# Patient Record
Sex: FEMALE | Race: WHITE | NOT HISPANIC OR LATINO | Employment: FULL TIME | URBAN - METROPOLITAN AREA
[De-identification: names, ages, dates, MRNs, and addresses within clinical notes are randomized per-mention and may not be internally consistent; named-entity substitution may affect disease eponyms.]

---

## 2017-03-06 ENCOUNTER — GENERIC CONVERSION - ENCOUNTER (OUTPATIENT)
Dept: OTHER | Facility: OTHER | Age: 27
End: 2017-03-06

## 2017-06-09 ENCOUNTER — ALLSCRIPTS OFFICE VISIT (OUTPATIENT)
Dept: OTHER | Facility: OTHER | Age: 27
End: 2017-06-09

## 2017-06-09 DIAGNOSIS — N92.6 IRREGULAR MENSTRUATION: ICD-10-CM

## 2017-06-17 ENCOUNTER — APPOINTMENT (OUTPATIENT)
Dept: LAB | Facility: CLINIC | Age: 27
End: 2017-06-17
Payer: COMMERCIAL

## 2017-06-17 ENCOUNTER — TRANSCRIBE ORDERS (OUTPATIENT)
Dept: LAB | Facility: CLINIC | Age: 27
End: 2017-06-17

## 2017-06-17 DIAGNOSIS — N92.6 IRREGULAR MENSTRUATION: ICD-10-CM

## 2017-06-17 LAB
ESTRADIOL SERPL-MCNC: 23 PG/ML
FSH SERPL-ACNC: 5.3 MIU/ML
GLUCOSE 2H P 75 G GLC PO SERPL-MCNC: 91 MG/DL (ref 70–183)
GLUCOSE P FAST SERPL-MCNC: 92 MG/DL (ref 65–99)
PROLACTIN SERPL-MCNC: 6.2 NG/ML
TSH SERPL DL<=0.05 MIU/L-ACNC: 1.63 UIU/ML (ref 0.36–3.74)

## 2017-06-17 PROCEDURE — 82670 ASSAY OF TOTAL ESTRADIOL: CPT

## 2017-06-17 PROCEDURE — 82627 DEHYDROEPIANDROSTERONE: CPT

## 2017-06-17 PROCEDURE — 82950 GLUCOSE TEST: CPT

## 2017-06-17 PROCEDURE — 82947 ASSAY GLUCOSE BLOOD QUANT: CPT

## 2017-06-17 PROCEDURE — 82948 REAGENT STRIP/BLOOD GLUCOSE: CPT

## 2017-06-17 PROCEDURE — 83001 ASSAY OF GONADOTROPIN (FSH): CPT

## 2017-06-17 PROCEDURE — 84146 ASSAY OF PROLACTIN: CPT

## 2017-06-17 PROCEDURE — 84403 ASSAY OF TOTAL TESTOSTERONE: CPT

## 2017-06-17 PROCEDURE — 36415 COLL VENOUS BLD VENIPUNCTURE: CPT

## 2017-06-17 PROCEDURE — 84402 ASSAY OF FREE TESTOSTERONE: CPT

## 2017-06-17 PROCEDURE — 84443 ASSAY THYROID STIM HORMONE: CPT

## 2017-06-18 LAB — DHEA-S SERPL-MCNC: 152.4 UG/DL (ref 84.8–378)

## 2017-06-19 LAB — GLUCOSE SERPL-MCNC: 85 MG/DL (ref 65–140)

## 2017-06-20 ENCOUNTER — APPOINTMENT (OUTPATIENT)
Dept: LAB | Facility: HOSPITAL | Age: 27
End: 2017-06-20
Attending: OBSTETRICS & GYNECOLOGY
Payer: COMMERCIAL

## 2017-06-20 ENCOUNTER — TRANSCRIBE ORDERS (OUTPATIENT)
Dept: ADMINISTRATIVE | Facility: HOSPITAL | Age: 27
End: 2017-06-20

## 2017-06-20 DIAGNOSIS — N92.6 IRREGULAR MENSTRUAL CYCLE: Primary | ICD-10-CM

## 2017-06-20 DIAGNOSIS — N92.6 IRREGULAR MENSTRUAL CYCLE: ICD-10-CM

## 2017-06-20 LAB
INSULIN SERPL-ACNC: 9.4 MU/L (ref 3–25)
TESTOST FREE SERPL-MCNC: 0.9 PG/ML (ref 0–4.2)
TESTOST SERPL-MCNC: 17 NG/DL (ref 8–48)

## 2017-06-20 PROCEDURE — 36415 COLL VENOUS BLD VENIPUNCTURE: CPT

## 2017-06-20 PROCEDURE — 83525 ASSAY OF INSULIN: CPT

## 2017-06-21 ENCOUNTER — HOSPITAL ENCOUNTER (OUTPATIENT)
Dept: RADIOLOGY | Age: 27
Discharge: HOME | End: 2017-06-21
Payer: COMMERCIAL

## 2017-06-21 DIAGNOSIS — N92.6 IRREGULAR MENSTRUATION: ICD-10-CM

## 2017-06-21 PROCEDURE — 76830 TRANSVAGINAL US NON-OB: CPT

## 2017-06-21 PROCEDURE — 76856 US EXAM PELVIC COMPLETE: CPT

## 2017-07-05 ENCOUNTER — APPOINTMENT (OUTPATIENT)
Dept: LAB | Facility: CLINIC | Age: 27
End: 2017-07-05
Payer: COMMERCIAL

## 2017-07-05 ENCOUNTER — ALLSCRIPTS OFFICE VISIT (OUTPATIENT)
Dept: OTHER | Facility: OTHER | Age: 27
End: 2017-07-05

## 2017-07-05 DIAGNOSIS — N92.6 IRREGULAR MENSTRUATION: ICD-10-CM

## 2017-07-05 LAB
ESTRADIOL SERPL-MCNC: 62 PG/ML
PROGEST SERPL-MCNC: 0.7 NG/ML

## 2017-07-05 PROCEDURE — 84144 ASSAY OF PROGESTERONE: CPT

## 2017-07-05 PROCEDURE — 82670 ASSAY OF TOTAL ESTRADIOL: CPT

## 2017-07-05 PROCEDURE — 36415 COLL VENOUS BLD VENIPUNCTURE: CPT

## 2017-07-19 ENCOUNTER — ALLSCRIPTS OFFICE VISIT (OUTPATIENT)
Dept: OTHER | Facility: OTHER | Age: 27
End: 2017-07-19

## 2017-08-17 ENCOUNTER — TRANSCRIBE ORDERS (OUTPATIENT)
Dept: ADMINISTRATIVE | Facility: HOSPITAL | Age: 27
End: 2017-08-17

## 2017-08-17 ENCOUNTER — APPOINTMENT (OUTPATIENT)
Dept: LAB | Facility: MEDICAL CENTER | Age: 27
End: 2017-08-17
Payer: COMMERCIAL

## 2017-08-17 DIAGNOSIS — Z31.41 FERTILITY TESTING: ICD-10-CM

## 2017-08-17 DIAGNOSIS — Z31.41 FERTILITY TESTING: Primary | ICD-10-CM

## 2017-08-17 PROCEDURE — 83516 IMMUNOASSAY NONANTIBODY: CPT

## 2017-08-21 LAB — MIS SERPL-MCNC: 6.19 NG/ML

## 2017-08-30 ENCOUNTER — HOSPITAL ENCOUNTER (OUTPATIENT)
Dept: RADIOLOGY | Facility: HOSPITAL | Age: 27
Discharge: HOME | End: 2017-08-30
Attending: OBSTETRICS & GYNECOLOGY | Admitting: RADIOLOGY
Payer: COMMERCIAL

## 2017-08-30 DIAGNOSIS — Z31.41 ENCOUNTER FOR FERTILITY TESTING: ICD-10-CM

## 2017-08-30 PROCEDURE — 58340 CATHETER FOR HYSTEROGRAPHY: CPT

## 2017-08-30 PROCEDURE — 74740 X-RAY FEMALE GENITAL TRACT: CPT

## 2017-08-30 RX ADMIN — IOHEXOL 4 ML: 240 INJECTION, SOLUTION INTRATHECAL; INTRAVASCULAR; INTRAVENOUS; ORAL at 17:30

## 2018-01-10 NOTE — MISCELLANEOUS
Provider Comments  Provider Comments:   Contacted patient and left message to reschedule appointment if necessary        Signatures   Electronically signed by : Radhames Cho, ; Oct 18 2016  8:39AM EST                       (Author)

## 2018-01-11 NOTE — RESULT NOTES
Message   Recorded as Task   Date: 06/12/2016 06:46 PM, Created By: Carson Morelos   Task Name: Verify Patient Results   Assigned To:  Sarah Dunbar   Regarding Patient: Jessica Wild, Status: Active   CommentEvorn Bills - 12 Jun 2016 6:46 PM        Sarah Dunbar - 14 Jun 2016 5:04 PM     TASK EDITED  Cards sent that Pap was normal        Signatures   Electronically signed by : Caroline Foreman CNM; Jun 14 2016  5:04PM EST                       (Author)

## 2018-01-14 VITALS
BODY MASS INDEX: 36.02 KG/M2 | SYSTOLIC BLOOD PRESSURE: 122 MMHG | WEIGHT: 211 LBS | HEIGHT: 64 IN | DIASTOLIC BLOOD PRESSURE: 80 MMHG

## 2018-01-14 VITALS
WEIGHT: 210 LBS | DIASTOLIC BLOOD PRESSURE: 74 MMHG | HEIGHT: 63 IN | SYSTOLIC BLOOD PRESSURE: 120 MMHG | BODY MASS INDEX: 37.21 KG/M2

## 2018-01-15 VITALS
HEIGHT: 64 IN | BODY MASS INDEX: 36.25 KG/M2 | WEIGHT: 212.31 LBS | DIASTOLIC BLOOD PRESSURE: 80 MMHG | SYSTOLIC BLOOD PRESSURE: 122 MMHG

## 2018-01-15 NOTE — MISCELLANEOUS
Message   Recorded as Task   Date: 03/06/2017 10:33 AM, Created By: Shyann May   Task Name: Medical Complaint Callback   Assigned To: Darrius Solomon   Regarding Patient: Peter Donohue, Status: In Progress   Comment:    Xochilt Bustillosberly - 06 Mar 2017 10:33 AM     TASK CREATED  Caller: Self; Medical Complaint; [phone] (Home)  Pt states menses is 8 days late and pt is not taking any bc  Pt took  a few HPT's and all came back negative  Pt would like to speak w a nurse  Pt is @ [facility] - 06 Mar 2017 10:34 AM     TASK IN PROGRESS   Giovanni Carlson - 06 Mar 2017 11:22 AM     TASK EDITED  I told pt it is ok to miss a period  Pt is trying to conceive  Does keep track of menses  If she misses next mo, will cb        Active Problems    1  Bleeding in early pregnancy (640 90) (O20 9)   2  Encounter for gynecological examination without abnormal finding (V72 31) (Z01 419)   3  Positive urine pregnancy test (V72 42) (Z32 01)    Allergies    1  No Known Drug Allergies    Signatures   Electronically signed by :  Adarsh Clayton, ; Mar  6 2017 11:23AM EST                       (Author)

## 2018-01-17 NOTE — MISCELLANEOUS
Message   Recorded as Task   Date: 10/17/2016 03:11 PM, Created By: Jonathan Finney   Task Name: Follow Up   Assigned To: Douglas Pina   Regarding Patient: Regina Mcguire, Status: In Progress   Luciana Neal - 17 Oct 2016 3:11 PM     TASK CREATED  Caller: Self; [phone] (Home)  pt wants bw results call her at [facility] - 17 Oct 2016 4:03 PM     TASK IN [address] [place] [address] - 17 Oct 2016 4:08 PM     TASK EDITED                 made pt aware  neg pregnancy test  started "period saturday" normal  advised if problem continues where she is skipping periods to call back  Patrick Harris - 17 Oct 2016 4:09 PM     TASK EDITED                 pt skipped period in september got period sat thought she was pregnant, sent HCG and negative results today, she is questioning if she needs US please advise thanks   Patrick Harris - 18 Oct 2016 9:18 AM     TASK EDITED  Denys Iyergilmadk -this is the patient that had a [address] today  I was unaware she had an apt when I spoke to patient for her results  Sorry for the confusion, pt was questioning her lab results Which were negative  pt made aware and was questioning if she still needed an US-which I accidently sent to [facility]  Active Problems    1  Bleeding in early pregnancy (640 90) (O20 9)   2  Encounter for gynecological examination without abnormal finding (V72 31) (Z01 419)   3  Positive urine pregnancy test (V72 42) (Z32 01)    Allergies    1   No Known Drug Allergies    Signatures   Electronically signed by : Melissa Katz LPN; Oct 18 6312  4:64AN EST                       (Author)

## 2018-01-17 NOTE — MISCELLANEOUS
Message   Recorded as Task  Date: 10/17/2016 09:19 AM, Created By: Bunny Whitten  Task Name: Follow Up  Assigned To: Desiree Arambula  Regarding Patient: Bernie Jaime, Status: Active  CommentMaryellenwilder Cain - 17 Oct 2016 9:19 AM    TASK CREATED  Caller: Self; [phone] (Home)  pt has had a few different readings on her pregnancy test first positive,then neg then she began to bleed not sure what she is to do please call her at [phone] - 17 Oct 2016 10:08 AM    TASK REPLIED TO: Previously Assigned To [facility],Team  p/c to pt  stated LMP 8/25/16  Pt still has only had some slight spotting  Pt denies any cramping or Abd  pain  Appt offered for today pt refused due to work obligations  Pt given appt  for tomorrow in W/G ofc  in AM  Rx for HCG, prg and T&S for [facility] /lab to be completed today  Ectopic and bleeding precautions given  Pt verbalized understanding  Active Problems    1  Bleeding in early pregnancy (640 90) (O20 9)   2  Encounter for gynecological examination without abnormal finding (V72 31) (Z01 419)   3  Positive urine pregnancy test (V72 42) (Z32 01)    Allergies    1  No Known Drug Allergies    Plan  Bleeding in early pregnancy, Positive urine pregnancy test    · (1) HCG QUANT; Status:Active - Retrospective Authorization; Requested for:17Oct2016;    · (1) TYPE & SCREEN; Status:Active - Retrospective Authorization; Requested  for:17Oct2016;   the patient been transfused in the last 3 months? : No  number of units for surgical date : 0  of Surgery : 17Oct2016  the patient pregnant? : Yes  Positive urine pregnancy test    · (1) PROGESTERONE; Status:Active - Retrospective Authorization;  Requested  FTI:[id number];     Signatures   Electronically signed by : Roxy Saha, ; Oct 17 2016 10:09AM EST                       (Author)

## 2018-02-26 ENCOUNTER — TELEPHONE (OUTPATIENT)
Dept: OBGYN CLINIC | Facility: CLINIC | Age: 28
End: 2018-02-26

## 2018-02-26 DIAGNOSIS — N91.2 AMENORRHEA: Primary | ICD-10-CM

## 2018-02-26 NOTE — TELEPHONE ENCOUNTER
Incoming call from patient  Has been trying to conceive for 2 years  Was seen by infertility  HSG done in August was normal  Positive home UPT today  Requesting HCG as she is anxious  Had a positive UPT before which turned out to be a chemical pregnancy  LMP 1/22/2018  Will route to provider

## 2018-02-28 ENCOUNTER — APPOINTMENT (OUTPATIENT)
Dept: LAB | Facility: HOSPITAL | Age: 28
End: 2018-02-28
Attending: OBSTETRICS & GYNECOLOGY
Payer: COMMERCIAL

## 2018-02-28 ENCOUNTER — TELEPHONE (OUTPATIENT)
Dept: OBGYN CLINIC | Facility: CLINIC | Age: 28
End: 2018-02-28

## 2018-02-28 DIAGNOSIS — N91.2 AMENORRHEA: ICD-10-CM

## 2018-02-28 LAB — B-HCG SERPL-ACNC: 287 MIU/ML

## 2018-02-28 PROCEDURE — 36415 COLL VENOUS BLD VENIPUNCTURE: CPT

## 2018-02-28 PROCEDURE — 84702 CHORIONIC GONADOTROPIN TEST: CPT

## 2018-02-28 NOTE — TELEPHONE ENCOUNTER
Pt advised of HCG results  Pt very happy with pregnancy   Will call for dating u s  appt  When aware of schedule  Advised to begin taking PNV's, increase water intake

## 2018-03-22 ENCOUNTER — OFFICE VISIT (OUTPATIENT)
Dept: OBGYN CLINIC | Facility: MEDICAL CENTER | Age: 28
End: 2018-03-22
Payer: COMMERCIAL

## 2018-03-22 VITALS — BODY MASS INDEX: 34.87 KG/M2 | WEIGHT: 200 LBS | DIASTOLIC BLOOD PRESSURE: 76 MMHG | SYSTOLIC BLOOD PRESSURE: 112 MMHG

## 2018-03-22 DIAGNOSIS — N91.2 AMENORRHEA: Primary | ICD-10-CM

## 2018-03-22 DIAGNOSIS — Z87.42 HISTORY OF INFERTILITY: ICD-10-CM

## 2018-03-22 PROBLEM — N92.6 IRREGULAR MENSES: Status: ACTIVE | Noted: 2017-06-09

## 2018-03-22 PROCEDURE — 76817 TRANSVAGINAL US OBSTETRIC: CPT | Performed by: NURSE PRACTITIONER

## 2018-03-22 PROCEDURE — 99213 OFFICE O/P EST LOW 20 MIN: CPT | Performed by: NURSE PRACTITIONER

## 2018-03-22 NOTE — PROGRESS NOTES
EARLY PREGNANCY ULTRASOUND    SUBJECTIVE    HPI: Jed Hedrick is a 29 y o  primigravida female here today for early pregnancy ultrasound  She is accompanied by her , Sina Bailey  Patient's last menstrual period was 01/22/2018 (exact date)  Menses are irregular, Q28-36d  This pregnancy was planned, they have been TTC x2y and did seek opinion from Dr Nico Lambert; this pregnancy was naturally conceived  Denies a family history of birth defects or intellectual defects  Vaccinated against varicella  Taking a prenatal vitamin  Works as an autistic  within the [employer]  Symptoms of pregnancy: breast tenderness, fatigue, nausea and positive home pregnancy test     No Known Allergies    Current Outpatient Prescriptions:     Prenatal Vit-Fe Fumarate-FA (PRENATAL COMPLETE PO), Take by mouth, Disp: , Rfl:       OBJECTIVE  Vitals:    03/22/18 0837   BP: 112/76   BP Location: Left arm   Patient Position: Sitting   Weight: 90 7 kg (200 lb)         Early OB Ultrasound Procedure Note: Transvaginal US    Technician: Study performed by the interpreting NP    Indications:  Early gestation, dating & viability    Procedure Details   The entire study was done at settings of 6 0 to 8 0 MHz  Gestational Sac: Present  Yolk sac: Present  Crown-rump length is 1 13cm and calculates to an estimated gestational age of 9 weeks, 2 days  Embryonic cardiac activity is seen at a rate of 188 b/min    Description of fetal anatomy Normal    Cul-de-sac: no fluid  Left ovary: corpus luteum present  Right ovary: appears normal  Description of uterus: anteverted  Description of cervix: Appears normal    Findings:  Viable, laughlin intrauterine pregnancy      ASSESSMENT  Early pregnancy at 7 weeks 2 days with a calculated MAGGY of 11/6/18 based on [address]  MAGGY differs by >7d from LMP, favor dating based on [address] [facility]  1 - Discussed referral to [facility] to discuss genetic screening options for fetus; reviewed time-sensitive nature  2 - RTO for OB interview and PN-1 visit          All questions were answered & Rogers Cormier expressed understanding      Jayesh Joe

## 2018-04-10 ENCOUNTER — INITIAL PRENATAL (OUTPATIENT)
Dept: OBGYN CLINIC | Facility: MEDICAL CENTER | Age: 28
End: 2018-04-10

## 2018-04-10 ENCOUNTER — APPOINTMENT (OUTPATIENT)
Dept: LAB | Facility: MEDICAL CENTER | Age: 28
End: 2018-04-10
Payer: COMMERCIAL

## 2018-04-10 VITALS
WEIGHT: 203 LBS | DIASTOLIC BLOOD PRESSURE: 78 MMHG | BODY MASS INDEX: 35.97 KG/M2 | RESPIRATION RATE: 14 BRPM | HEIGHT: 63 IN | SYSTOLIC BLOOD PRESSURE: 122 MMHG

## 2018-04-10 DIAGNOSIS — Z34.91 FIRST TRIMESTER PREGNANCY: ICD-10-CM

## 2018-04-10 DIAGNOSIS — Z34.91 FIRST TRIMESTER PREGNANCY: Primary | ICD-10-CM

## 2018-04-10 LAB
ABO GROUP BLD: NORMAL
BACTERIA UR QL AUTO: ABNORMAL /HPF
BASOPHILS # BLD AUTO: 0.01 THOUSANDS/ΜL (ref 0–0.1)
BASOPHILS NFR BLD AUTO: 0 % (ref 0–1)
BILIRUB UR QL STRIP: NEGATIVE
BLD GP AB SCN SERPL QL: NEGATIVE
CLARITY UR: CLEAR
COLOR UR: ABNORMAL
EOSINOPHIL # BLD AUTO: 0.12 THOUSAND/ΜL (ref 0–0.61)
EOSINOPHIL NFR BLD AUTO: 2 % (ref 0–6)
ERYTHROCYTE [DISTWIDTH] IN BLOOD BY AUTOMATED COUNT: 13.9 % (ref 11.6–15.1)
GLUCOSE UR STRIP-MCNC: NEGATIVE MG/DL
HBV SURFACE AG SER QL: NORMAL
HCT VFR BLD AUTO: 40.9 % (ref 34.8–46.1)
HGB BLD-MCNC: 13.8 G/DL (ref 11.5–15.4)
HGB UR QL STRIP.AUTO: NEGATIVE
KETONES UR STRIP-MCNC: NEGATIVE MG/DL
LEUKOCYTE ESTERASE UR QL STRIP: ABNORMAL
LYMPHOCYTES # BLD AUTO: 1.84 THOUSANDS/ΜL (ref 0.6–4.47)
LYMPHOCYTES NFR BLD AUTO: 24 % (ref 14–44)
MCH RBC QN AUTO: 28.2 PG (ref 26.8–34.3)
MCHC RBC AUTO-ENTMCNC: 33.7 G/DL (ref 31.4–37.4)
MCV RBC AUTO: 84 FL (ref 82–98)
MONOCYTES # BLD AUTO: 0.51 THOUSAND/ΜL (ref 0.17–1.22)
MONOCYTES NFR BLD AUTO: 7 % (ref 4–12)
MUCOUS THREADS UR QL AUTO: ABNORMAL
NEUTROPHILS # BLD AUTO: 5.28 THOUSANDS/ΜL (ref 1.85–7.62)
NEUTS SEG NFR BLD AUTO: 67 % (ref 43–75)
NITRITE UR QL STRIP: NEGATIVE
NON-SQ EPI CELLS URNS QL MICRO: ABNORMAL /HPF
NRBC BLD AUTO-RTO: 0 /100 WBCS
PH UR STRIP.AUTO: 7 [PH] (ref 4.5–8)
PLATELET # BLD AUTO: 295 THOUSANDS/UL (ref 149–390)
PMV BLD AUTO: 10.5 FL (ref 8.9–12.7)
PROT UR STRIP-MCNC: ABNORMAL MG/DL
RBC # BLD AUTO: 4.9 MILLION/UL (ref 3.81–5.12)
RBC #/AREA URNS AUTO: ABNORMAL /HPF
RH BLD: POSITIVE
RUBV IGG SERPL IA-ACNC: 52.5 IU/ML
SP GR UR STRIP.AUTO: 1.03 (ref 1–1.03)
SPECIMEN EXPIRATION DATE: NORMAL
UROBILINOGEN UR QL STRIP.AUTO: 0.2 E.U./DL
WBC # BLD AUTO: 7.77 THOUSAND/UL (ref 4.31–10.16)
WBC #/AREA URNS AUTO: ABNORMAL /HPF

## 2018-04-10 PROCEDURE — 81001 URINALYSIS AUTO W/SCOPE: CPT

## 2018-04-10 PROCEDURE — 87086 URINE CULTURE/COLONY COUNT: CPT

## 2018-04-10 PROCEDURE — 80081 OBSTETRIC PANEL INC HIV TSTG: CPT

## 2018-04-10 PROCEDURE — 36415 COLL VENOUS BLD VENIPUNCTURE: CPT

## 2018-04-10 PROCEDURE — OBC: Performed by: OBSTETRICS & GYNECOLOGY

## 2018-04-10 NOTE — PROGRESS NOTES
Patient came to the [facility] Intake Interview with her  Warner Argueta in which they are both very happy with the pregnancy this is their first pregnancy   They have a good family support team waiting to give them a helping hand   Patient is planning to breast feed the baby   They do not have any plans to travel during the pregnancy   Patient reports that she does not have any cats at home   Patient reports that she was vaccinated against the varicella   Patient answer the CRAFFT screening questions in which she score a ( 0)   Patient is planing in doing Genetic Test a referral was given to the patient and appointment was schedule   Patient is planning to do her prenatal blood work after the visit   Patient schedule her prenatal visits up to when she reaches her Third Trimester

## 2018-04-11 LAB
BACTERIA UR CULT: NORMAL
HIV 1+2 AB+HIV1 P24 AG SERPL QL IA: NORMAL
RPR SER QL: NORMAL

## 2018-04-24 ENCOUNTER — INITIAL PRENATAL (OUTPATIENT)
Dept: OBGYN CLINIC | Facility: MEDICAL CENTER | Age: 28
End: 2018-04-24

## 2018-04-24 ENCOUNTER — ROUTINE PRENATAL (OUTPATIENT)
Dept: PERINATAL CARE | Facility: MEDICAL CENTER | Age: 28
End: 2018-04-24
Payer: COMMERCIAL

## 2018-04-24 VITALS
WEIGHT: 202 LBS | HEIGHT: 63 IN | DIASTOLIC BLOOD PRESSURE: 85 MMHG | SYSTOLIC BLOOD PRESSURE: 121 MMHG | BODY MASS INDEX: 35.79 KG/M2 | HEART RATE: 90 BPM

## 2018-04-24 VITALS — BODY MASS INDEX: 35.61 KG/M2 | DIASTOLIC BLOOD PRESSURE: 74 MMHG | SYSTOLIC BLOOD PRESSURE: 118 MMHG | WEIGHT: 201 LBS

## 2018-04-24 DIAGNOSIS — Z34.91 FIRST TRIMESTER PREGNANCY: ICD-10-CM

## 2018-04-24 DIAGNOSIS — Z34.01 ENCOUNTER FOR SUPERVISION OF NORMAL FIRST PREGNANCY IN FIRST TRIMESTER: Primary | ICD-10-CM

## 2018-04-24 DIAGNOSIS — Z36.82 ENCOUNTER FOR NUCHAL TRANSLUCENCY TESTING: Primary | ICD-10-CM

## 2018-04-24 DIAGNOSIS — O99.211 OBESITY AFFECTING PREGNANCY IN FIRST TRIMESTER: ICD-10-CM

## 2018-04-24 DIAGNOSIS — Z3A.12 PREGNANCY WITH 12 COMPLETED WEEKS GESTATION: ICD-10-CM

## 2018-04-24 PROBLEM — E66.9 OBESITY (BMI 35.0-39.9 WITHOUT COMORBIDITY): Status: ACTIVE | Noted: 2018-04-24

## 2018-04-24 PROCEDURE — 76813 OB US NUCHAL MEAS 1 GEST: CPT | Performed by: OBSTETRICS & GYNECOLOGY

## 2018-04-24 PROCEDURE — 87591 N.GONORRHOEAE DNA AMP PROB: CPT | Performed by: OBSTETRICS & GYNECOLOGY

## 2018-04-24 PROCEDURE — PNV: Performed by: OBSTETRICS & GYNECOLOGY

## 2018-04-24 PROCEDURE — 87491 CHLMYD TRACH DNA AMP PROBE: CPT | Performed by: OBSTETRICS & GYNECOLOGY

## 2018-04-24 PROCEDURE — 99201 PR OFFICE OUTPATIENT NEW 10 MINUTES: CPT | Performed by: OBSTETRICS & GYNECOLOGY

## 2018-04-24 NOTE — PROGRESS NOTES
Going for seq screen today  Feels well, some nausea  Discussed testing for CF and SMA     Ines Bhatt is here for her first prenatal visit  Age: 29 y o  LMP: Patient's last menstrual period was 2018 (exact date)  Gestational age 13w0d based on LMP No, early [address] Yes    1  Para 0         Previous C Section: No  She admits to nausea and vomiting  She has had no vaginal bleeding  Patients has no complaints  She  is planning consultation with maternal fetal medicine for a sequential screen and genetic screening  Allergies: Patient has no known allergies  Medication use :   Current Outpatient Prescriptions   Medication Sig Dispense Refill    Prenatal Vit-Fe Fumarate-FA (PRENATAL COMPLETE PO) Take by mouth       No current facility-administered medications for this visit  She is a non-smoker  In this pregnancy her  medical history is significant for obesity    Her obstetrical, medical, surgical and family history was reviewed  Her physical exam was normal  A Pap smear was not obtained, Gonorrhea and Chlamydia testing was obtained    Discussed as well during this visit was diet, prenatal vitamins, prenatal visits, lab testing, breast feeding, vaccinations, maternal fetal medicine consultations, prevention of exposure to infectious diseases and toxic chemicals, lifestyle      Risk factors for this pregnancy include: obesity

## 2018-04-25 LAB
CHLAMYDIA DNA CVX QL NAA+PROBE: NORMAL
N GONORRHOEA DNA GENITAL QL NAA+PROBE: NORMAL

## 2018-05-01 LAB
# FETUSES US: 1
AGE: NORMAL
B-HCG ADJ MOM SERPL: 0.98
B-HCG SERPL-ACNC: 66.7 IU/ML
COLLECT DATE: NORMAL
CURRENT SMOKER: NO
DONATED EGG PATIENT QL: NO
FET CRL US.MEAS: 57 MM
FET CRL US.MEAS: NORMAL MM
FET NUCHAL FOLD MOM THICKNESS US.MEAS: 1.12
FET NUCHAL FOLD THICKNESS US.MEAS: 1.5 MM
FET NUCHAL FOLD THICKNESS US.MEAS: NORMAL MM
FET TS 21 RISK FROM MAT AGE: NORMAL
GA CLIN EST: 12.4 WK
GA METHOD: NORMAL
HX OF NTD NARR: NO
HX OF TRISOMY 21 NARR: NO
IDDM PATIENT QL: NO
INTEGRATED SCN PATIENT-IMP: NORMAL
PAPP-A MOM SERPL: 1.56
PAPP-A SERPL-MCNC: 918.5 NG/ML
PHYSICIAN NPI: NORMAL
SL AMB NASAL BONE: PRESENT
SL AMB NTQR LOCATION ID#: NORMAL
SL AMB NTQR READING PHYS ID#: NORMAL
SL AMB REFERRING PHYSICIAN NAME: NORMAL
SL AMB REFERRING PHYSICIAN PHONE: NORMAL
SL AMB REPEAT SPECIMEN: NO
SL AMB TWIN B NASAL BONE: NORMAL
SONOGRAPHER NAME: NORMAL
SONOGRAPHER: NORMAL
SONOGRAPHER: NORMAL
TS 18 RISK FETUS: NORMAL
TS 21 RISK FETUS: NORMAL
US DATE: NORMAL
US FETUSES STUDY [IMP]: NORMAL

## 2018-05-03 ENCOUNTER — TELEPHONE (OUTPATIENT)
Dept: PERINATAL CARE | Facility: CLINIC | Age: 28
End: 2018-05-03

## 2018-05-03 NOTE — TELEPHONE ENCOUNTER
----- Message from Haylee Shi MD sent at 5/2/2018  4:51 PM EDT -----  I have reviewed the results which are normal   Please call patient and notify her of normal results  Thank you

## 2018-05-22 ENCOUNTER — ROUTINE PRENATAL (OUTPATIENT)
Dept: OBGYN CLINIC | Facility: MEDICAL CENTER | Age: 28
End: 2018-05-22

## 2018-05-22 ENCOUNTER — TELEPHONE (OUTPATIENT)
Dept: OBGYN CLINIC | Facility: CLINIC | Age: 28
End: 2018-05-22

## 2018-05-22 VITALS — BODY MASS INDEX: 37.55 KG/M2 | WEIGHT: 212 LBS | DIASTOLIC BLOOD PRESSURE: 80 MMHG | SYSTOLIC BLOOD PRESSURE: 120 MMHG

## 2018-05-22 DIAGNOSIS — Z3A.16 16 WEEKS GESTATION OF PREGNANCY: ICD-10-CM

## 2018-05-22 DIAGNOSIS — Z34.02 ENCOUNTER FOR SUPERVISION OF NORMAL FIRST PREGNANCY IN SECOND TRIMESTER: Primary | ICD-10-CM

## 2018-05-22 DIAGNOSIS — O99.210 MATERNAL OBESITY AFFECTING PREGNANCY, ANTEPARTUM: ICD-10-CM

## 2018-05-22 PROBLEM — N92.6 IRREGULAR MENSES: Status: RESOLVED | Noted: 2017-06-09 | Resolved: 2018-05-22

## 2018-05-22 PROBLEM — Z3A.12 PREGNANCY WITH 12 COMPLETED WEEKS GESTATION: Status: RESOLVED | Noted: 2018-04-24 | Resolved: 2018-05-22

## 2018-05-22 PROCEDURE — PNV: Performed by: NURSE PRACTITIONER

## 2018-05-22 NOTE — TELEPHONE ENCOUNTER
----- Message from [facility] sent at 5/22/2018  1:39 PM EDT -----  I didn't realize until after she left that we do not have an early glucola on file for indication of maternal BMI >35, I have placed the orders, can you call Edmond Singh to let her know? Thank you

## 2018-05-22 NOTE — PROGRESS NOTES
Problem List Items Addressed This Visit     Encounter for supervision of normal first pregnancy in second trimester - Primary     Doing well  Denies OB complaints  Occasional headaches, tolerable  Stepwise sequential screen in progress  Maternal obesity affecting pregnancy, antepartum     No early glucola on file, will have OB nurses reach out to April Lorenzo to set this up  Relevant Orders    Glucose, 1H PG    16 weeks gestation of pregnancy     L2 anatomy scan is set up at [facility]  Return in 4 weeks               Costa Hagan

## 2018-05-22 NOTE — Clinical Note
I didn't realize until after she left that we do not have an early glucola on file for indication of maternal BMI >35, I have placed the orders, can you call Arin Marin to let her know? Thank you

## 2018-05-22 NOTE — TELEPHONE ENCOUNTER
Patient returned call - she is aware [facility] would like her to go for a early glucola and that the order is in [place]  Will go ASAP

## 2018-05-24 LAB
# FETUSES US: 1
AFP ADJ MOM SERPL: 0.71
AFP SERPL-MCNC: 19 NG/ML
B-HCG ADJ MOM SERPL: 0.84
B-HCG SERPL-ACNC: 24.4 IU/ML
COLLECT DATE: NORMAL
CURRENT SMOKER: NO
FET CRL US.MEAS: 57 MM
FET NUCHAL FOLD MOM THICKNESS US.MEAS: 1.12
FET NUCHAL FOLD THICKNESS US.MEAS: 1.5 MM
FET TS 21 RISK FROM MAT AGE: NORMAL
GA CLIN EST: 16 WK
HX OF NTD NARR: NO
IDDM PATIENT QL: NO
INHIBIN A ADJ MOM SERPL: 0.86
INHIBIN A SERPL-MCNC: 128 PG/ML
INTEGRATED SCN PATIENT-IMP: NORMAL
NEURAL TUBE DEFECT RISK FETUS: NORMAL %
PAPP-A MOM SERPL: 1.56
PAPP-A SERPL-MCNC: 918.5 NG/ML
PHYSICIAN NPI: NORMAL
SL AMB NASAL BONE: PRESENT
SL AMB REFERRING PHYSICIAN NAME: NORMAL
SL AMB REFERRING PHYSICIAN PHONE: NORMAL
SL AMB REPEAT SPECIMEN: NO
SL AMB SPECIMEN # FROM PART 1: NORMAL
SL AMB TWIN B NASAL BONE: NORMAL
TS 18 RISK FETUS: NORMAL
TS 21 RISK FETUS: NORMAL
U ESTRIOL ADJ MOM SERPL: 1.06
U ESTRIOL SERPL-MCNC: 0.73 NG/ML
US DATE: NORMAL

## 2018-05-30 ENCOUNTER — TELEPHONE (OUTPATIENT)
Dept: PERINATAL CARE | Facility: CLINIC | Age: 28
End: 2018-05-30

## 2018-05-30 NOTE — TELEPHONE ENCOUNTER
----- Message from Maco Fairbanks MD sent at 5/25/2018  4:33 PM EDT -----  I have reviewed the results which are normal   Please call patient and notify her of normal results  Thank you

## 2018-05-31 ENCOUNTER — LAB (OUTPATIENT)
Dept: LAB | Facility: HOSPITAL | Age: 28
End: 2018-05-31
Payer: COMMERCIAL

## 2018-05-31 DIAGNOSIS — O99.210 MATERNAL OBESITY AFFECTING PREGNANCY, ANTEPARTUM: ICD-10-CM

## 2018-05-31 LAB — GLUCOSE 1H P 50 G GLC PO SERPL-MCNC: 120 MG/DL

## 2018-05-31 PROCEDURE — 82950 GLUCOSE TEST: CPT

## 2018-05-31 PROCEDURE — 36415 COLL VENOUS BLD VENIPUNCTURE: CPT

## 2018-06-19 ENCOUNTER — ROUTINE PRENATAL (OUTPATIENT)
Dept: PERINATAL CARE | Facility: MEDICAL CENTER | Age: 28
End: 2018-06-19
Payer: COMMERCIAL

## 2018-06-19 ENCOUNTER — ROUTINE PRENATAL (OUTPATIENT)
Dept: OBGYN CLINIC | Facility: MEDICAL CENTER | Age: 28
End: 2018-06-19

## 2018-06-19 VITALS
WEIGHT: 217 LBS | HEART RATE: 86 BPM | DIASTOLIC BLOOD PRESSURE: 74 MMHG | SYSTOLIC BLOOD PRESSURE: 114 MMHG | HEIGHT: 63 IN | BODY MASS INDEX: 38.45 KG/M2

## 2018-06-19 VITALS — WEIGHT: 216 LBS | BODY MASS INDEX: 38.26 KG/M2 | SYSTOLIC BLOOD PRESSURE: 112 MMHG | DIASTOLIC BLOOD PRESSURE: 70 MMHG

## 2018-06-19 DIAGNOSIS — Z34.02 ENCOUNTER FOR SUPERVISION OF NORMAL FIRST PREGNANCY IN SECOND TRIMESTER: Primary | ICD-10-CM

## 2018-06-19 DIAGNOSIS — O99.210 MATERNAL OBESITY AFFECTING PREGNANCY, ANTEPARTUM: ICD-10-CM

## 2018-06-19 DIAGNOSIS — O99.210 MATERNAL OBESITY AFFECTING PREGNANCY, ANTEPARTUM: Primary | ICD-10-CM

## 2018-06-19 DIAGNOSIS — Z36.86 ENCOUNTER FOR ANTENATAL SCREENING FOR CERVICAL LENGTH: ICD-10-CM

## 2018-06-19 DIAGNOSIS — E66.9 OBESITY (BMI 35.0-39.9 WITHOUT COMORBIDITY): ICD-10-CM

## 2018-06-19 DIAGNOSIS — Z3A.20 20 WEEKS GESTATION OF PREGNANCY: ICD-10-CM

## 2018-06-19 PROCEDURE — 76817 TRANSVAGINAL US OBSTETRIC: CPT | Performed by: OBSTETRICS & GYNECOLOGY

## 2018-06-19 PROCEDURE — PNV: Performed by: NURSE PRACTITIONER

## 2018-06-19 PROCEDURE — 76811 OB US DETAILED SNGL FETUS: CPT | Performed by: OBSTETRICS & GYNECOLOGY

## 2018-06-19 NOTE — PROGRESS NOTES
Please see her ultrasound report under the  ob procedure tab  Nml SS  Recommend follow-up ultrasound for growth in 12 weeks secondary to her increased BMI   Renee Kan MD

## 2018-06-19 NOTE — ASSESSMENT & PLAN NOTE
Had L2 anatomy scan today, awaiting note  Gender will be a surprise  Handed in birth plan today  Return in 4 weeks

## 2018-06-19 NOTE — PROGRESS NOTES
Problem List Items Addressed This Visit     Encounter for supervision of normal first pregnancy in second trimester - Primary     Doing well  Denies OB complaints  Not yet feeling FM  Normal Sequential Screen  Maternal obesity affecting pregnancy, antepartum     Passed early glucola  Will go for 32 week growth scan at [facility]  20 weeks gestation of pregnancy     Had L2 anatomy scan today, awaiting note  Gender will be a surprise  Handed in birth plan today  Return in 4 weeks               Michelle Salazar

## 2018-07-17 ENCOUNTER — ROUTINE PRENATAL (OUTPATIENT)
Dept: OBGYN CLINIC | Facility: MEDICAL CENTER | Age: 28
End: 2018-07-17

## 2018-07-17 VITALS — WEIGHT: 222.6 LBS | SYSTOLIC BLOOD PRESSURE: 116 MMHG | DIASTOLIC BLOOD PRESSURE: 68 MMHG | BODY MASS INDEX: 39.43 KG/M2

## 2018-07-17 DIAGNOSIS — Z3A.23 23 WEEKS GESTATION OF PREGNANCY: ICD-10-CM

## 2018-07-17 DIAGNOSIS — Z34.02 ENCOUNTER FOR SUPERVISION OF NORMAL FIRST PREGNANCY IN SECOND TRIMESTER: Primary | ICD-10-CM

## 2018-07-17 PROBLEM — Z3A.20 20 WEEKS GESTATION OF PREGNANCY: Status: RESOLVED | Noted: 2018-05-22 | Resolved: 2018-07-17

## 2018-07-17 PROCEDURE — PNV: Performed by: PHYSICIAN ASSISTANT

## 2018-07-17 NOTE — PROGRESS NOTES
Patient w/o OB complaints  (+) good fetal movement, denies any bleeding, fluid leakage or ctx    Level 2 US WNL, for 32wk growth US    Problem List Items Addressed This Visit     Encounter for supervision of normal first pregnancy in second trimester - Primary     RTO 4 wks  28wk lab slip given  Reviewed PTL precautions fetal kick counts and reasons to call           Other Visit Diagnoses     23 weeks gestation of pregnancy        Relevant Orders    CBC    Glucose, 1H PG    RPR

## 2018-08-02 ENCOUNTER — TELEPHONE (OUTPATIENT)
Dept: OBGYN CLINIC | Facility: CLINIC | Age: 28
End: 2018-08-02

## 2018-08-02 NOTE — TELEPHONE ENCOUNTER
Pt called office today, left voicemail message  Pt's MAGGY is 11/6/18, GA 26 wks + 2 dys  Pt saw STEPHANY Velarde on 7/17/18, scheduled to see her again on 8/14/18  Pt states she had a dental appointment today, was informed that she requires a root canal   Pt further states she was also prescribed Amoxicillin 500 mg, wants to know if it is okay to have a root canal and take Amoxicillin  Pt can be reached @ [phone]

## 2018-08-07 ENCOUNTER — APPOINTMENT (OUTPATIENT)
Dept: LAB | Facility: MEDICAL CENTER | Age: 28
End: 2018-08-07
Payer: COMMERCIAL

## 2018-08-07 ENCOUNTER — TELEPHONE (OUTPATIENT)
Dept: OBGYN CLINIC | Facility: CLINIC | Age: 28
End: 2018-08-07

## 2018-08-07 DIAGNOSIS — R73.09 ABNORMAL GLUCOSE: Primary | ICD-10-CM

## 2018-08-07 LAB
ERYTHROCYTE [DISTWIDTH] IN BLOOD BY AUTOMATED COUNT: 13.1 % (ref 11.6–15.1)
GLUCOSE 1H P 50 G GLC PO SERPL-MCNC: 139 MG/DL
HCT VFR BLD AUTO: 36.9 % (ref 34.8–46.1)
HGB BLD-MCNC: 11.9 G/DL (ref 11.5–15.4)
MCH RBC QN AUTO: 27.9 PG (ref 26.8–34.3)
MCHC RBC AUTO-ENTMCNC: 32.2 G/DL (ref 31.4–37.4)
MCV RBC AUTO: 87 FL (ref 82–98)
PLATELET # BLD AUTO: 258 THOUSANDS/UL (ref 149–390)
PMV BLD AUTO: 10.6 FL (ref 8.9–12.7)
RBC # BLD AUTO: 4.26 MILLION/UL (ref 3.81–5.12)
WBC # BLD AUTO: 8.44 THOUSAND/UL (ref 4.31–10.16)

## 2018-08-07 PROCEDURE — 36415 COLL VENOUS BLD VENIPUNCTURE: CPT | Performed by: PHYSICIAN ASSISTANT

## 2018-08-07 PROCEDURE — 85027 COMPLETE CBC AUTOMATED: CPT | Performed by: PHYSICIAN ASSISTANT

## 2018-08-07 PROCEDURE — 86592 SYPHILIS TEST NON-TREP QUAL: CPT | Performed by: PHYSICIAN ASSISTANT

## 2018-08-07 PROCEDURE — 82950 GLUCOSE TEST: CPT | Performed by: PHYSICIAN ASSISTANT

## 2018-08-07 NOTE — TELEPHONE ENCOUNTER
Patient is aware of 1 hr glucose results and that she needs to do a 3 hr GTT  Explained it is fasting and done at 3 locations  Order in pt chart - will go this week

## 2018-08-07 NOTE — TELEPHONE ENCOUNTER
----- Message from Betsy Polo PA-C sent at 8/7/2018  4:31 PM EDT -----  1hr GTT elevated, needs 3hr GTT, please call pt and let her know

## 2018-08-08 LAB — RPR SER QL: NORMAL

## 2018-08-09 ENCOUNTER — APPOINTMENT (OUTPATIENT)
Dept: LAB | Facility: HOSPITAL | Age: 28
End: 2018-08-09
Payer: COMMERCIAL

## 2018-08-09 DIAGNOSIS — R73.09 ABNORMAL GLUCOSE: ICD-10-CM

## 2018-08-09 LAB
GLUCOSE 1H P 100 G GLC PO SERPL-MCNC: 162 MG/DL (ref 65–179)
GLUCOSE 2H P 100 G GLC PO SERPL-MCNC: 150 MG/DL (ref 65–154)
GLUCOSE 3H P 100 G GLC PO SERPL-MCNC: 108 MG/DL (ref 40–500)
GLUCOSE P FAST SERPL-MCNC: 88 MG/DL (ref 65–99)

## 2018-08-09 PROCEDURE — 36415 COLL VENOUS BLD VENIPUNCTURE: CPT

## 2018-08-09 PROCEDURE — 82951 GLUCOSE TOLERANCE TEST (GTT): CPT

## 2018-08-09 PROCEDURE — 82952 GTT-ADDED SAMPLES: CPT

## 2018-08-14 ENCOUNTER — ROUTINE PRENATAL (OUTPATIENT)
Dept: OBGYN CLINIC | Facility: MEDICAL CENTER | Age: 28
End: 2018-08-14

## 2018-08-14 VITALS — SYSTOLIC BLOOD PRESSURE: 130 MMHG | BODY MASS INDEX: 40.57 KG/M2 | WEIGHT: 229 LBS | DIASTOLIC BLOOD PRESSURE: 78 MMHG

## 2018-08-14 DIAGNOSIS — Z34.03 ENCOUNTER FOR SUPERVISION OF NORMAL FIRST PREGNANCY IN THIRD TRIMESTER: Primary | ICD-10-CM

## 2018-08-14 PROCEDURE — PNV: Performed by: PHYSICIAN ASSISTANT

## 2018-08-14 NOTE — PROGRESS NOTES
Patient w/o complaints  (+) good fetal movement, denies any bleeding, fluid leakage or ctx   1hr GTT elevated, 3hr GTT 4/4 normal     Problem List Items Addressed This Visit     Encounter for supervision of normal first pregnancy in third trimester - Primary     RTO 2 wks  Will think about tdap next visit, discussed reasons to get it  Reviewed PTL precautions, fetal kick counts and reasons to call

## 2018-08-14 NOTE — ASSESSMENT & PLAN NOTE
RTO 2 wks  Will think about tdap next visit, discussed reasons to get it  Reviewed PTL precautions, fetal kick counts and reasons to call

## 2018-08-31 ENCOUNTER — ROUTINE PRENATAL (OUTPATIENT)
Dept: OBGYN CLINIC | Facility: CLINIC | Age: 28
End: 2018-08-31
Payer: COMMERCIAL

## 2018-08-31 VITALS — SYSTOLIC BLOOD PRESSURE: 122 MMHG | WEIGHT: 232.8 LBS | DIASTOLIC BLOOD PRESSURE: 70 MMHG | BODY MASS INDEX: 41.24 KG/M2

## 2018-08-31 DIAGNOSIS — Z34.03 ENCOUNTER FOR SUPERVISION OF NORMAL FIRST PREGNANCY IN THIRD TRIMESTER: Primary | ICD-10-CM

## 2018-08-31 DIAGNOSIS — Z23 NEED FOR TETANUS, DIPHTHERIA, AND ACELLULAR PERTUSSIS (TDAP) VACCINE IN PATIENT OF ADOLESCENT AGE OR OLDER: ICD-10-CM

## 2018-08-31 DIAGNOSIS — O99.810 ABNORMAL GLUCOSE AFFECTING PREGNANCY: ICD-10-CM

## 2018-08-31 PROCEDURE — PNV: Performed by: NURSE PRACTITIONER

## 2018-08-31 PROCEDURE — 90715 TDAP VACCINE 7 YRS/> IM: CPT

## 2018-08-31 PROCEDURE — 90471 IMMUNIZATION ADMIN: CPT

## 2018-08-31 NOTE — PROGRESS NOTES
Problem List Items Addressed This Visit     Encounter for supervision of normal first pregnancy in third trimester - Primary     Denies OB complaints  Good fetal movement  Denies contractions, cramping, leakage of fluid or vaginal bleeding  Tdap administered today  Gender is a surprise  Baby and Me considerations reinforced  Rx for breast pump provided  Reviewed  labor precautions and FKCs              Abnormal glucose affecting pregnancy     Elevated 1hr glucose -> 3hr gtt is WNL  32 week growth and BRUNA are scheduled            Other Visit Diagnoses     Need for tetanus, diphtheria, and acellular pertussis (Tdap) vaccine in patient of adolescent age or older        Relevant Orders    TDAP Vaccine greater than or equal to 8yo (Completed)

## 2018-08-31 NOTE — ASSESSMENT & PLAN NOTE
Denies OB complaints  Good fetal movement  Denies contractions, cramping, leakage of fluid or vaginal bleeding  Tdap administered today  Gender is a surprise  Baby and Me considerations reinforced  Rx for breast pump provided  Reviewed  labor precautions and FKCs

## 2018-09-11 ENCOUNTER — ULTRASOUND (OUTPATIENT)
Dept: PERINATAL CARE | Facility: MEDICAL CENTER | Age: 28
End: 2018-09-11
Payer: COMMERCIAL

## 2018-09-11 ENCOUNTER — ROUTINE PRENATAL (OUTPATIENT)
Dept: OBGYN CLINIC | Facility: MEDICAL CENTER | Age: 28
End: 2018-09-11

## 2018-09-11 VITALS
WEIGHT: 235 LBS | BODY MASS INDEX: 41.64 KG/M2 | DIASTOLIC BLOOD PRESSURE: 82 MMHG | HEIGHT: 63 IN | HEART RATE: 102 BPM | SYSTOLIC BLOOD PRESSURE: 126 MMHG

## 2018-09-11 VITALS — WEIGHT: 235 LBS | BODY MASS INDEX: 41.63 KG/M2 | SYSTOLIC BLOOD PRESSURE: 118 MMHG | DIASTOLIC BLOOD PRESSURE: 68 MMHG

## 2018-09-11 DIAGNOSIS — Z3A.32 32 WEEKS GESTATION OF PREGNANCY: ICD-10-CM

## 2018-09-11 DIAGNOSIS — O99.213 OBESITY AFFECTING PREGNANCY IN THIRD TRIMESTER: Primary | ICD-10-CM

## 2018-09-11 DIAGNOSIS — Z34.03 ENCOUNTER FOR SUPERVISION OF NORMAL FIRST PREGNANCY IN THIRD TRIMESTER: Primary | ICD-10-CM

## 2018-09-11 DIAGNOSIS — Z36.89 ENCOUNTER FOR ULTRASOUND TO CHECK FETAL GROWTH: ICD-10-CM

## 2018-09-11 DIAGNOSIS — IMO0002 EVALUATE ANATOMY NOT SEEN ON PRIOR SONOGRAM: ICD-10-CM

## 2018-09-11 PROCEDURE — 76816 OB US FOLLOW-UP PER FETUS: CPT | Performed by: OBSTETRICS & GYNECOLOGY

## 2018-09-11 PROCEDURE — PNV: Performed by: PHYSICIAN ASSISTANT

## 2018-09-11 NOTE — PATIENT INSTRUCTIONS
Thank you for choosing [facility] for your  care today  If you have any questions about your ultrasound or care, please do not hesitate to contact us or your primary obstetrician

## 2018-09-11 NOTE — PROGRESS NOTES
Patient w/o complaints  (+) good fetal movement, denies any bleeding, fluid leakage or ctx  Had [facility] growth US- WNL    Problem List Items Addressed This Visit     Encounter for supervision of normal first pregnancy in third trimester - Primary     RTO 2 wks  Continued PNC fetal surv    Reviewed PTL precautions, fetal kick counts and reasons to call

## 2018-09-11 NOTE — PROGRESS NOTES
[address] [facility] [place]: Ms Serjio North was seen today at 32w0d for fetal growth and followup missed anatomy ultrasound  See ultrasound report under "OB Procedures" tab  Please don't hesitate to contact our office with any concerns or questions    Angel Domingo MD

## 2018-09-11 NOTE — ASSESSMENT & PLAN NOTE
RTO 2 wks  Continued PNC fetal surv    Reviewed PTL precautions, fetal kick counts and reasons to call

## 2018-09-27 ENCOUNTER — ROUTINE PRENATAL (OUTPATIENT)
Dept: OBGYN CLINIC | Facility: MEDICAL CENTER | Age: 28
End: 2018-09-27
Payer: COMMERCIAL

## 2018-09-27 VITALS — DIASTOLIC BLOOD PRESSURE: 82 MMHG | BODY MASS INDEX: 42.34 KG/M2 | SYSTOLIC BLOOD PRESSURE: 120 MMHG | WEIGHT: 239 LBS

## 2018-09-27 DIAGNOSIS — Z34.03 ENCOUNTER FOR SUPERVISION OF NORMAL FIRST PREGNANCY IN THIRD TRIMESTER: Primary | ICD-10-CM

## 2018-09-27 DIAGNOSIS — O99.210 MATERNAL OBESITY AFFECTING PREGNANCY, ANTEPARTUM: ICD-10-CM

## 2018-09-27 DIAGNOSIS — Z3A.34 34 WEEKS GESTATION OF PREGNANCY: ICD-10-CM

## 2018-09-27 DIAGNOSIS — Z23 FLU VACCINE NEED: ICD-10-CM

## 2018-09-27 PROCEDURE — 90471 IMMUNIZATION ADMIN: CPT

## 2018-09-27 PROCEDURE — 90686 IIV4 VACC NO PRSV 0.5 ML IM: CPT

## 2018-09-27 PROCEDURE — PNV: Performed by: NURSE PRACTITIONER

## 2018-09-27 NOTE — ASSESSMENT & PLAN NOTE
Denies OB complaints  Good fetal movement  Denies contractions, cramping, leakage of fluid or vaginal bleeding  Flu vaccine administered today  S/p tdap vaccine  Baby and Me considerations reinforced  Reviewed labor precautions and FKCs

## 2018-09-27 NOTE — ASSESSMENT & PLAN NOTE
Breech on 9/11/18 US  Palpates transverse on today's exam  Advised will scan for position at 36 week visit  Briefly reviewed ECV versus pLTC/s; risks/benefits discussed

## 2018-09-27 NOTE — PROGRESS NOTES
Problem List Items Addressed This Visit     Encounter for supervision of normal first pregnancy in third trimester - Primary     Denies OB complaints  Good fetal movement  Denies contractions, cramping, leakage of fluid or vaginal bleeding  Flu vaccine administered today  S/p tdap vaccine  Baby and Me considerations reinforced  Reviewed labor precautions and FKCs  Maternal obesity affecting pregnancy, antepartum     Weekly  testing is scheduled to begin at 36 weeks  18 EFW 46%, AC 36%, BRUNA 21           34 weeks gestation of pregnancy    Relevant Orders    SYRINGE/SINGLE-DOSE VIAL: influenza vaccine, 5755-1755, quadrivalent, 0 5 mL, preservative-free, for patients 3+ yr (FLUZONE) (Completed)    Breech presentation     Breech on 18 US  Palpates transverse on today's exam  Advised will scan for position at 36 week visit  Briefly reviewed ECV versus pLTC/s; risks/benefits discussed              Other Visit Diagnoses     Flu vaccine need        Relevant Orders    SYRINGE/SINGLE-DOSE VIAL: influenza vaccine, 0626-6417, quadrivalent, 0 5 mL, preservative-free, for patients 3+ yr (FLUZONE) (Completed)

## 2018-10-09 ENCOUNTER — ROUTINE PRENATAL (OUTPATIENT)
Dept: OBGYN CLINIC | Facility: MEDICAL CENTER | Age: 28
End: 2018-10-09

## 2018-10-09 ENCOUNTER — ROUTINE PRENATAL (OUTPATIENT)
Dept: PERINATAL CARE | Facility: MEDICAL CENTER | Age: 28
End: 2018-10-09
Payer: COMMERCIAL

## 2018-10-09 VITALS
BODY MASS INDEX: 42.86 KG/M2 | DIASTOLIC BLOOD PRESSURE: 82 MMHG | SYSTOLIC BLOOD PRESSURE: 108 MMHG | HEART RATE: 100 BPM | WEIGHT: 241.9 LBS | HEIGHT: 63 IN

## 2018-10-09 VITALS — SYSTOLIC BLOOD PRESSURE: 108 MMHG | WEIGHT: 242 LBS | BODY MASS INDEX: 42.87 KG/M2 | DIASTOLIC BLOOD PRESSURE: 70 MMHG

## 2018-10-09 DIAGNOSIS — Z3A.36 36 WEEKS GESTATION OF PREGNANCY: ICD-10-CM

## 2018-10-09 DIAGNOSIS — Z36.89 ENCOUNTER FOR ULTRASOUND TO CHECK FETAL GROWTH: ICD-10-CM

## 2018-10-09 DIAGNOSIS — Z34.03 ENCOUNTER FOR SUPERVISION OF NORMAL FIRST PREGNANCY IN THIRD TRIMESTER: Primary | ICD-10-CM

## 2018-10-09 DIAGNOSIS — O99.210 MATERNAL OBESITY AFFECTING PREGNANCY, ANTEPARTUM: Primary | ICD-10-CM

## 2018-10-09 PROBLEM — Z87.42 HISTORY OF INFERTILITY: Status: RESOLVED | Noted: 2018-03-22 | Resolved: 2018-10-09

## 2018-10-09 PROCEDURE — 59025 FETAL NON-STRESS TEST: CPT | Performed by: OBSTETRICS & GYNECOLOGY

## 2018-10-09 PROCEDURE — 87653 STREP B DNA AMP PROBE: CPT | Performed by: PHYSICIAN ASSISTANT

## 2018-10-09 PROCEDURE — 76816 OB US FOLLOW-UP PER FETUS: CPT | Performed by: OBSTETRICS & GYNECOLOGY

## 2018-10-09 PROCEDURE — PNV: Performed by: PHYSICIAN ASSISTANT

## 2018-10-09 NOTE — PROGRESS NOTES
Patient w/o complaints  (+) good fetal movement, denies any bleeding, fluid leakage, or ctx  Last US was breech  On cervical exam today, uncertain lie could be breech or head position very high  Patient having [facility] BRUNA US today    Patient states if still breech will want C/S    Problem List Items Addressed This Visit     Encounter for supervision of normal first pregnancy in third trimester - Primary     RTO 1 wk  GBS done today  Pt to have BRUNA today to check baby position  Reviewed labor precautions, fetal kick counts and reasons to call         Relevant Orders    Strep B DNA probe, amplification

## 2018-10-09 NOTE — PROGRESS NOTES
NST procedure and expected outcome explained to patient  Daily fetal kick count discussed with handout given  Patient verbalized understanding of all and was receptive      Alicia Lima RN

## 2018-10-09 NOTE — ASSESSMENT & PLAN NOTE
RTO 1 wk  GBS done today  Pt to have BRUNA today to check baby position  Reviewed labor precautions, fetal kick counts and reasons to call

## 2018-10-09 NOTE — PROGRESS NOTES
[address] [facility]: Ms Bro Gallego was seen today at 36w0d for NST (found under the pregnancy episode) which I reviewed the RN assessment and agree, and fetal growth ultrasound (see ultrasound report under OB procedures tab)  Breech presentation has resolved  Please don't hesitate to contact our office with any concerns or questions    Guicho Galindo MD

## 2018-10-11 LAB — GP B STREP DNA SPEC QL NAA+PROBE: NORMAL

## 2018-10-16 ENCOUNTER — ROUTINE PRENATAL (OUTPATIENT)
Dept: PERINATAL CARE | Facility: MEDICAL CENTER | Age: 28
End: 2018-10-16
Payer: COMMERCIAL

## 2018-10-16 ENCOUNTER — ROUTINE PRENATAL (OUTPATIENT)
Dept: OBGYN CLINIC | Facility: MEDICAL CENTER | Age: 28
End: 2018-10-16

## 2018-10-16 VITALS
DIASTOLIC BLOOD PRESSURE: 75 MMHG | HEIGHT: 63 IN | WEIGHT: 246.4 LBS | BODY MASS INDEX: 43.66 KG/M2 | HEART RATE: 104 BPM | SYSTOLIC BLOOD PRESSURE: 117 MMHG

## 2018-10-16 VITALS — SYSTOLIC BLOOD PRESSURE: 102 MMHG | BODY MASS INDEX: 43.44 KG/M2 | DIASTOLIC BLOOD PRESSURE: 62 MMHG | WEIGHT: 245.2 LBS

## 2018-10-16 DIAGNOSIS — O99.213 MATERNAL MORBID OBESITY IN THIRD TRIMESTER, ANTEPARTUM (HCC): ICD-10-CM

## 2018-10-16 DIAGNOSIS — Z34.03 ENCOUNTER FOR SUPERVISION OF NORMAL FIRST PREGNANCY IN THIRD TRIMESTER: Primary | ICD-10-CM

## 2018-10-16 DIAGNOSIS — E66.01 MATERNAL MORBID OBESITY IN THIRD TRIMESTER, ANTEPARTUM (HCC): ICD-10-CM

## 2018-10-16 DIAGNOSIS — Z3A.37 37 WEEKS GESTATION OF PREGNANCY: ICD-10-CM

## 2018-10-16 PROBLEM — Z3A.34 34 WEEKS GESTATION OF PREGNANCY: Status: RESOLVED | Noted: 2018-09-11 | Resolved: 2018-10-16

## 2018-10-16 PROCEDURE — PNV: Performed by: PHYSICIAN ASSISTANT

## 2018-10-16 PROCEDURE — 59025 FETAL NON-STRESS TEST: CPT | Performed by: OBSTETRICS & GYNECOLOGY

## 2018-10-16 PROCEDURE — 76815 OB US LIMITED FETUS(S): CPT | Performed by: OBSTETRICS & GYNECOLOGY

## 2018-10-16 NOTE — PROGRESS NOTES
Patient w/o complaints  (+) good fetal movement, denies any bleeding, fluid leakage or ctx      Problem List Items Addressed This Visit     Encounter for supervision of normal first pregnancy in third trimester - Primary     RTO 1 wk  Reviewed labor precautions, fetal kick counts and reasons to call

## 2018-10-16 NOTE — PROGRESS NOTES
Please refer to the [facility] ultrasound report in Ob Procedures for additional information regarding the visit to the [facility]  today

## 2018-10-16 NOTE — PROGRESS NOTES
NST procedure and expected outcome explained to patient  Daily fetal kick count reviewed and emphasized  Patient verbalized understanding of all and was receptive      Sabas Cloud RN

## 2018-10-23 ENCOUNTER — ROUTINE PRENATAL (OUTPATIENT)
Dept: PERINATAL CARE | Facility: MEDICAL CENTER | Age: 28
End: 2018-10-23
Payer: COMMERCIAL

## 2018-10-23 VITALS
DIASTOLIC BLOOD PRESSURE: 64 MMHG | HEIGHT: 63 IN | SYSTOLIC BLOOD PRESSURE: 129 MMHG | WEIGHT: 248.7 LBS | HEART RATE: 94 BPM | BODY MASS INDEX: 44.07 KG/M2

## 2018-10-23 DIAGNOSIS — Z3A.38 38 WEEKS GESTATION OF PREGNANCY: ICD-10-CM

## 2018-10-23 DIAGNOSIS — O99.213 MATERNAL MORBID OBESITY IN THIRD TRIMESTER, ANTEPARTUM (HCC): Primary | ICD-10-CM

## 2018-10-23 DIAGNOSIS — E66.01 MATERNAL MORBID OBESITY IN THIRD TRIMESTER, ANTEPARTUM (HCC): Primary | ICD-10-CM

## 2018-10-23 PROCEDURE — 59025 FETAL NON-STRESS TEST: CPT | Performed by: OBSTETRICS & GYNECOLOGY

## 2018-10-23 PROCEDURE — 76815 OB US LIMITED FETUS(S): CPT | Performed by: OBSTETRICS & GYNECOLOGY

## 2018-10-24 ENCOUNTER — ROUTINE PRENATAL (OUTPATIENT)
Dept: OBGYN CLINIC | Facility: MEDICAL CENTER | Age: 28
End: 2018-10-24

## 2018-10-24 VITALS
DIASTOLIC BLOOD PRESSURE: 70 MMHG | BODY MASS INDEX: 44.12 KG/M2 | HEIGHT: 63 IN | RESPIRATION RATE: 14 BRPM | WEIGHT: 249 LBS | SYSTOLIC BLOOD PRESSURE: 120 MMHG

## 2018-10-24 DIAGNOSIS — Z3A.28 28 WEEKS GESTATION OF PREGNANCY: Primary | ICD-10-CM

## 2018-10-24 PROCEDURE — PNV: Performed by: OBSTETRICS & GYNECOLOGY

## 2018-10-24 NOTE — PROGRESS NOTES
Patient is a 25-year-old female, para 0, at 38 weeks and 1 day here for routine prenatal visit without complaint  Review of systems is positive for fetal movement negative for loss of fluid vaginal bleeding or regular contractions

## 2018-10-30 ENCOUNTER — ROUTINE PRENATAL (OUTPATIENT)
Dept: PERINATAL CARE | Facility: MEDICAL CENTER | Age: 28
End: 2018-10-30
Payer: COMMERCIAL

## 2018-10-30 ENCOUNTER — ROUTINE PRENATAL (OUTPATIENT)
Dept: OBGYN CLINIC | Facility: MEDICAL CENTER | Age: 28
End: 2018-10-30

## 2018-10-30 VITALS — BODY MASS INDEX: 44.18 KG/M2 | DIASTOLIC BLOOD PRESSURE: 74 MMHG | SYSTOLIC BLOOD PRESSURE: 110 MMHG | WEIGHT: 249.4 LBS

## 2018-10-30 VITALS
BODY MASS INDEX: 44.12 KG/M2 | WEIGHT: 249 LBS | HEIGHT: 63 IN | SYSTOLIC BLOOD PRESSURE: 115 MMHG | HEART RATE: 106 BPM | DIASTOLIC BLOOD PRESSURE: 78 MMHG

## 2018-10-30 DIAGNOSIS — Z34.03 ENCOUNTER FOR SUPERVISION OF NORMAL FIRST PREGNANCY IN THIRD TRIMESTER: Primary | ICD-10-CM

## 2018-10-30 DIAGNOSIS — Z3A.39 39 WEEKS GESTATION OF PREGNANCY: ICD-10-CM

## 2018-10-30 DIAGNOSIS — E66.01 MATERNAL MORBID OBESITY IN THIRD TRIMESTER, ANTEPARTUM (HCC): Primary | ICD-10-CM

## 2018-10-30 DIAGNOSIS — O99.213 MATERNAL MORBID OBESITY IN THIRD TRIMESTER, ANTEPARTUM (HCC): Primary | ICD-10-CM

## 2018-10-30 PROCEDURE — 59025 FETAL NON-STRESS TEST: CPT | Performed by: OBSTETRICS & GYNECOLOGY

## 2018-10-30 PROCEDURE — 76815 OB US LIMITED FETUS(S): CPT | Performed by: OBSTETRICS & GYNECOLOGY

## 2018-10-30 PROCEDURE — PNV: Performed by: PHYSICIAN ASSISTANT

## 2018-10-30 NOTE — PROGRESS NOTES
NST procedure and expected outcome explained to patient  Daily fetal kick count reviewed and emphasized  Patient verbalized understanding of all and was receptive      Sasha Dye RN

## 2018-10-30 NOTE — PROGRESS NOTES
Patient w/o complaints   (+) good fetal movement, denies any bleeding, fluid leakage or ctx    Problem List Items Addressed This Visit     Encounter for supervision of normal first pregnancy in third trimester - Primary     RTO 1 wk  Has [facility] NST/BRUNA after todays appointment  Reviewed labor precautions, fetal kick counts and reasons to call

## 2018-10-30 NOTE — ASSESSMENT & PLAN NOTE
RTO 1 wk  Has [facility] NST/BRUNA after todays appointment  Reviewed labor precautions, fetal kick counts and reasons to call

## 2018-11-06 ENCOUNTER — ROUTINE PRENATAL (OUTPATIENT)
Dept: PERINATAL CARE | Facility: MEDICAL CENTER | Age: 28
End: 2018-11-06
Payer: COMMERCIAL

## 2018-11-06 ENCOUNTER — ROUTINE PRENATAL (OUTPATIENT)
Dept: OBGYN CLINIC | Facility: CLINIC | Age: 28
End: 2018-11-06

## 2018-11-06 VITALS
SYSTOLIC BLOOD PRESSURE: 130 MMHG | HEIGHT: 63 IN | WEIGHT: 251 LBS | DIASTOLIC BLOOD PRESSURE: 80 MMHG | BODY MASS INDEX: 44.47 KG/M2 | HEART RATE: 100 BPM

## 2018-11-06 VITALS — WEIGHT: 251 LBS | BODY MASS INDEX: 44.46 KG/M2

## 2018-11-06 DIAGNOSIS — Z34.03 ENCOUNTER FOR SUPERVISION OF NORMAL FIRST PREGNANCY IN THIRD TRIMESTER: Primary | ICD-10-CM

## 2018-11-06 DIAGNOSIS — E66.01 MATERNAL MORBID OBESITY IN THIRD TRIMESTER, ANTEPARTUM (HCC): Primary | ICD-10-CM

## 2018-11-06 DIAGNOSIS — E66.01 MATERNAL MORBID OBESITY IN THIRD TRIMESTER, ANTEPARTUM (HCC): ICD-10-CM

## 2018-11-06 DIAGNOSIS — Z3A.40 40 WEEKS GESTATION OF PREGNANCY: ICD-10-CM

## 2018-11-06 DIAGNOSIS — O99.213 MATERNAL MORBID OBESITY IN THIRD TRIMESTER, ANTEPARTUM (HCC): Primary | ICD-10-CM

## 2018-11-06 DIAGNOSIS — O99.213 MATERNAL MORBID OBESITY IN THIRD TRIMESTER, ANTEPARTUM (HCC): ICD-10-CM

## 2018-11-06 PROCEDURE — 76815 OB US LIMITED FETUS(S): CPT | Performed by: OBSTETRICS & GYNECOLOGY

## 2018-11-06 PROCEDURE — PNV: Performed by: OBSTETRICS & GYNECOLOGY

## 2018-11-06 PROCEDURE — 59025 FETAL NON-STRESS TEST: CPT | Performed by: OBSTETRICS & GYNECOLOGY

## 2018-11-06 NOTE — PROGRESS NOTES
Angelo Joans is doing well  She desires IOL next week Wed night or later  Has PNC follow up scheduled  Labor precautions reviewed  Both of her sisters have had c-sections

## 2018-11-08 ENCOUNTER — TELEPHONE (OUTPATIENT)
Dept: OBGYN CLINIC | Facility: CLINIC | Age: 28
End: 2018-11-08

## 2018-11-09 ENCOUNTER — ROUTINE PRENATAL (OUTPATIENT)
Dept: PERINATAL CARE | Facility: MEDICAL CENTER | Age: 28
End: 2018-11-09
Payer: COMMERCIAL

## 2018-11-09 VITALS
HEART RATE: 97 BPM | SYSTOLIC BLOOD PRESSURE: 117 MMHG | BODY MASS INDEX: 44.61 KG/M2 | DIASTOLIC BLOOD PRESSURE: 71 MMHG | WEIGHT: 251.8 LBS | HEIGHT: 63 IN

## 2018-11-09 DIAGNOSIS — O99.213 MATERNAL MORBID OBESITY IN THIRD TRIMESTER, ANTEPARTUM (HCC): Primary | ICD-10-CM

## 2018-11-09 DIAGNOSIS — E66.01 MATERNAL MORBID OBESITY IN THIRD TRIMESTER, ANTEPARTUM (HCC): Primary | ICD-10-CM

## 2018-11-09 DIAGNOSIS — Z3A.40 40 WEEKS GESTATION OF PREGNANCY: ICD-10-CM

## 2018-11-09 PROCEDURE — 59025 FETAL NON-STRESS TEST: CPT | Performed by: OBSTETRICS & GYNECOLOGY

## 2018-11-09 NOTE — PROGRESS NOTES
Non-Stress Testing:    Non-Stress test, equipment, and expected outcomes reviewed  Verified with teach back method  Pt verbalizes +FM  Pt denies ALL:               Leaking of fluid   Contractions   Vaginal bleeding   Decreased fetal movement        NST length of time: 25 minutes

## 2018-11-12 ENCOUNTER — TELEPHONE (OUTPATIENT)
Dept: OBGYN CLINIC | Facility: CLINIC | Age: 28
End: 2018-11-12

## 2018-11-12 NOTE — TELEPHONE ENCOUNTER
, 40w6d - patient is scheduled for induction Wednesday evening  This morning, she woke up and is having brown-red mucous discharge and low pelvic cramps  She has good fetal movement, no contractions and no leakage of fluids  No recent intercourse and not lifting anything over 25lbs  Does have an appointment scheduled for tomorrow with Edwina Escoto  Patient is worried

## 2018-11-12 NOTE — TELEPHONE ENCOUNTER
Ney Texted [id number] - she stated that pt may be starting to go into labor  Just monitor for now  If bleeding like a period or if baby is not moving well later, pt will need to be seen  Called pt back and advised her of this   Patient understands that if she starts to bleed or have contractions to call us

## 2018-11-13 ENCOUNTER — ROUTINE PRENATAL (OUTPATIENT)
Dept: PERINATAL CARE | Facility: MEDICAL CENTER | Age: 28
End: 2018-11-13
Payer: COMMERCIAL

## 2018-11-13 ENCOUNTER — ROUTINE PRENATAL (OUTPATIENT)
Dept: OBGYN CLINIC | Facility: CLINIC | Age: 28
End: 2018-11-13

## 2018-11-13 VITALS
DIASTOLIC BLOOD PRESSURE: 83 MMHG | SYSTOLIC BLOOD PRESSURE: 132 MMHG | WEIGHT: 249.9 LBS | HEIGHT: 63 IN | BODY MASS INDEX: 44.28 KG/M2 | HEART RATE: 112 BPM

## 2018-11-13 VITALS — WEIGHT: 250.2 LBS | BODY MASS INDEX: 44.32 KG/M2 | SYSTOLIC BLOOD PRESSURE: 134 MMHG | DIASTOLIC BLOOD PRESSURE: 78 MMHG

## 2018-11-13 DIAGNOSIS — E66.01 MATERNAL MORBID OBESITY IN THIRD TRIMESTER, ANTEPARTUM (HCC): ICD-10-CM

## 2018-11-13 DIAGNOSIS — Z34.03 ENCOUNTER FOR SUPERVISION OF NORMAL FIRST PREGNANCY IN THIRD TRIMESTER: Primary | ICD-10-CM

## 2018-11-13 DIAGNOSIS — Z3A.41 41 WEEKS GESTATION OF PREGNANCY: ICD-10-CM

## 2018-11-13 DIAGNOSIS — O99.213 MATERNAL MORBID OBESITY IN THIRD TRIMESTER, ANTEPARTUM (HCC): ICD-10-CM

## 2018-11-13 PROBLEM — O48.0 41 WEEKS GESTATION OF PREGNANCY: Status: ACTIVE | Noted: 2018-09-11

## 2018-11-13 PROCEDURE — 59025 FETAL NON-STRESS TEST: CPT | Performed by: OBSTETRICS & GYNECOLOGY

## 2018-11-13 PROCEDURE — PNV: Performed by: NURSE PRACTITIONER

## 2018-11-13 PROCEDURE — 76815 OB US LIMITED FETUS(S): CPT | Performed by: OBSTETRICS & GYNECOLOGY

## 2018-11-13 NOTE — ASSESSMENT & PLAN NOTE
Denies OB complaints  Good fetal movement  Occ, mild and irreg ctx  Denies leakage of fluid or vaginal bleeding  IOL is scheduled for tomorrow  Reviewed admission process  Baby and Me considerations reinforced  Reviewed labor precautions and FKCs

## 2018-11-13 NOTE — PROGRESS NOTES
Problem List Items Addressed This Visit     Encounter for supervision of normal first pregnancy in third trimester - Primary     Denies OB complaints  Good fetal movement  Occ, mild and irreg ctx  Denies leakage of fluid or vaginal bleeding  IOL is scheduled for tomorrow  Reviewed admission process  Baby and Me considerations reinforced  Reviewed labor precautions and FKCs              41 weeks gestation of pregnancy

## 2018-11-14 ENCOUNTER — TELEPHONE (OUTPATIENT)
Dept: OBGYN CLINIC | Facility: CLINIC | Age: 28
End: 2018-11-14

## 2018-11-14 ENCOUNTER — HOSPITAL ENCOUNTER (OUTPATIENT)
Dept: LABOR AND DELIVERY | Facility: HOSPITAL | Age: 28
Discharge: HOME | End: 2018-11-14
Payer: COMMERCIAL

## 2018-11-14 ENCOUNTER — HOSPITAL ENCOUNTER (INPATIENT)
Facility: HOSPITAL | Age: 28
LOS: 3 days | Discharge: HOME | End: 2018-11-17
Attending: OBSTETRICS & GYNECOLOGY | Admitting: OBSTETRICS & GYNECOLOGY
Payer: COMMERCIAL

## 2018-11-14 ENCOUNTER — ANESTHESIA (INPATIENT)
Dept: LABOR AND DELIVERY | Facility: HOSPITAL | Age: 28
End: 2018-11-14
Payer: COMMERCIAL

## 2018-11-14 ENCOUNTER — ANESTHESIA EVENT (INPATIENT)
Dept: LABOR AND DELIVERY | Facility: HOSPITAL | Age: 28
End: 2018-11-14
Payer: COMMERCIAL

## 2018-11-14 DIAGNOSIS — Z41.9 ELECTIVE SURGERY: ICD-10-CM

## 2018-11-14 DIAGNOSIS — Z3A.41 41 WEEKS GESTATION OF PREGNANCY: Primary | ICD-10-CM

## 2018-11-14 LAB
ABO GROUP BLD: NORMAL
ALBUMIN SERPL BCP-MCNC: 2.3 G/DL (ref 3.5–5)
ALBUMIN SERPL BCP-MCNC: 2.6 G/DL (ref 3.5–5)
ALP SERPL-CCNC: 160 U/L (ref 46–116)
ALP SERPL-CCNC: 182 U/L (ref 46–116)
ALT SERPL W P-5'-P-CCNC: 18 U/L (ref 12–78)
ALT SERPL W P-5'-P-CCNC: 19 U/L (ref 12–78)
ANION GAP SERPL CALCULATED.3IONS-SCNC: 7 MMOL/L (ref 4–13)
ANION GAP SERPL CALCULATED.3IONS-SCNC: 9 MMOL/L (ref 4–13)
AST SERPL W P-5'-P-CCNC: 14 U/L (ref 5–45)
AST SERPL W P-5'-P-CCNC: 15 U/L (ref 5–45)
BASOPHILS # BLD AUTO: 0.02 THOUSANDS/ΜL (ref 0–0.1)
BASOPHILS NFR BLD AUTO: 0 % (ref 0–1)
BILIRUB SERPL-MCNC: 0.45 MG/DL (ref 0.2–1)
BILIRUB SERPL-MCNC: 0.48 MG/DL (ref 0.2–1)
BLD GP AB SCN SERPL QL: NEGATIVE
BUN SERPL-MCNC: 7 MG/DL (ref 5–25)
BUN SERPL-MCNC: 7 MG/DL (ref 5–25)
CALCIUM SERPL-MCNC: 8.5 MG/DL (ref 8.3–10.1)
CALCIUM SERPL-MCNC: 8.6 MG/DL (ref 8.3–10.1)
CHLORIDE SERPL-SCNC: 105 MMOL/L (ref 100–108)
CHLORIDE SERPL-SCNC: 106 MMOL/L (ref 100–108)
CO2 SERPL-SCNC: 20 MMOL/L (ref 21–32)
CO2 SERPL-SCNC: 23 MMOL/L (ref 21–32)
CREAT SERPL-MCNC: 0.54 MG/DL (ref 0.6–1.3)
CREAT SERPL-MCNC: 0.67 MG/DL (ref 0.6–1.3)
CREAT UR-MCNC: 69.9 MG/DL
EOSINOPHIL # BLD AUTO: 0.01 THOUSAND/ΜL (ref 0–0.61)
EOSINOPHIL NFR BLD AUTO: 0 % (ref 0–6)
ERYTHROCYTE [DISTWIDTH] IN BLOOD BY AUTOMATED COUNT: 13.9 % (ref 11.6–15.1)
GFR SERPL CREATININE-BSD FRML MDRD: 120 ML/MIN/1.73SQ M
GFR SERPL CREATININE-BSD FRML MDRD: 129 ML/MIN/1.73SQ M
GLUCOSE SERPL-MCNC: 31 MG/DL (ref 65–140)
GLUCOSE SERPL-MCNC: 72 MG/DL (ref 65–140)
HCT VFR BLD AUTO: 39.7 % (ref 34.8–46.1)
HGB BLD-MCNC: 12.9 G/DL (ref 11.5–15.4)
IMM GRANULOCYTES # BLD AUTO: 0.04 THOUSAND/UL (ref 0–0.2)
IMM GRANULOCYTES NFR BLD AUTO: 0 % (ref 0–2)
LYMPHOCYTES # BLD AUTO: 1.55 THOUSANDS/ΜL (ref 0.6–4.47)
LYMPHOCYTES NFR BLD AUTO: 13 % (ref 14–44)
MCH RBC QN AUTO: 27 PG (ref 26.8–34.3)
MCHC RBC AUTO-ENTMCNC: 32.5 G/DL (ref 31.4–37.4)
MCV RBC AUTO: 83 FL (ref 82–98)
MONOCYTES # BLD AUTO: 0.65 THOUSAND/ΜL (ref 0.17–1.22)
MONOCYTES NFR BLD AUTO: 5 % (ref 4–12)
NEUTROPHILS # BLD AUTO: 9.95 THOUSANDS/ΜL (ref 1.85–7.62)
NEUTS SEG NFR BLD AUTO: 82 % (ref 43–75)
NRBC BLD AUTO-RTO: 0 /100 WBCS
PLATELET # BLD AUTO: 283 THOUSANDS/UL (ref 149–390)
PMV BLD AUTO: 10.3 FL (ref 8.9–12.7)
POTASSIUM SERPL-SCNC: 3.8 MMOL/L (ref 3.5–5.3)
POTASSIUM SERPL-SCNC: 3.8 MMOL/L (ref 3.5–5.3)
PROT SERPL-MCNC: 6.4 G/DL (ref 6.4–8.2)
PROT SERPL-MCNC: 7 G/DL (ref 6.4–8.2)
PROT UR-MCNC: 10 MG/DL
PROT/CREAT UR: 0.14 MG/G{CREAT} (ref 0–0.1)
RBC # BLD AUTO: 4.78 MILLION/UL (ref 3.81–5.12)
RH BLD: POSITIVE
RPR SER QL: NORMAL
SODIUM SERPL-SCNC: 135 MMOL/L (ref 136–145)
SODIUM SERPL-SCNC: 135 MMOL/L (ref 136–145)
SPECIMEN EXPIRATION DATE: NORMAL
WBC # BLD AUTO: 12.22 THOUSAND/UL (ref 4.31–10.16)

## 2018-11-14 PROCEDURE — 86901 BLOOD TYPING SEROLOGIC RH(D): CPT | Performed by: OBSTETRICS & GYNECOLOGY

## 2018-11-14 PROCEDURE — 84156 ASSAY OF PROTEIN URINE: CPT | Performed by: OBSTETRICS & GYNECOLOGY

## 2018-11-14 PROCEDURE — 4A1HXCZ MONITORING OF PRODUCTS OF CONCEPTION, CARDIAC RATE, EXTERNAL APPROACH: ICD-10-PCS | Performed by: OBSTETRICS & GYNECOLOGY

## 2018-11-14 PROCEDURE — 85025 COMPLETE CBC W/AUTO DIFF WBC: CPT | Performed by: OBSTETRICS & GYNECOLOGY

## 2018-11-14 PROCEDURE — 80053 COMPREHEN METABOLIC PANEL: CPT | Performed by: OBSTETRICS & GYNECOLOGY

## 2018-11-14 PROCEDURE — 86850 RBC ANTIBODY SCREEN: CPT | Performed by: OBSTETRICS & GYNECOLOGY

## 2018-11-14 PROCEDURE — 86592 SYPHILIS TEST NON-TREP QUAL: CPT | Performed by: OBSTETRICS & GYNECOLOGY

## 2018-11-14 PROCEDURE — 99214 OFFICE O/P EST MOD 30 MIN: CPT

## 2018-11-14 PROCEDURE — 82570 ASSAY OF URINE CREATININE: CPT | Performed by: OBSTETRICS & GYNECOLOGY

## 2018-11-14 PROCEDURE — 10907ZC DRAINAGE OF AMNIOTIC FLUID, THERAPEUTIC FROM PRODUCTS OF CONCEPTION, VIA NATURAL OR ARTIFICIAL OPENING: ICD-10-PCS | Performed by: OBSTETRICS & GYNECOLOGY

## 2018-11-14 PROCEDURE — 86900 BLOOD TYPING SEROLOGIC ABO: CPT | Performed by: OBSTETRICS & GYNECOLOGY

## 2018-11-14 RX ORDER — OXYTOCIN/RINGER'S LACTATE 30/500 ML
PLASTIC BAG, INJECTION (ML) INTRAVENOUS
Status: COMPLETED
Start: 2018-11-14 | End: 2018-11-14

## 2018-11-14 RX ORDER — SODIUM CHLORIDE, SODIUM LACTATE, POTASSIUM CHLORIDE, CALCIUM CHLORIDE 600; 310; 30; 20 MG/100ML; MG/100ML; MG/100ML; MG/100ML
125 INJECTION, SOLUTION INTRAVENOUS CONTINUOUS
Status: DISCONTINUED | OUTPATIENT
Start: 2018-11-14 | End: 2018-11-15

## 2018-11-14 RX ORDER — LIDOCAINE HYDROCHLORIDE AND EPINEPHRINE 15; 5 MG/ML; UG/ML
INJECTION, SOLUTION EPIDURAL AS NEEDED
Status: DISCONTINUED | OUTPATIENT
Start: 2018-11-14 | End: 2018-11-15 | Stop reason: SURG

## 2018-11-14 RX ORDER — OXYTOCIN/RINGER'S LACTATE 30/500 ML
1-30 PLASTIC BAG, INJECTION (ML) INTRAVENOUS
Status: DISCONTINUED | OUTPATIENT
Start: 2018-11-14 | End: 2018-11-15

## 2018-11-14 RX ORDER — CHLOROPROCAINE HYDROCHLORIDE 30 MG/ML
INJECTION, SOLUTION EPIDURAL; INFILTRATION; INTRACAUDAL; PERINEURAL AS NEEDED
Status: DISCONTINUED | OUTPATIENT
Start: 2018-11-14 | End: 2018-11-15 | Stop reason: SURG

## 2018-11-14 RX ORDER — BUTORPHANOL TARTRATE 1 MG/ML
1 INJECTION, SOLUTION INTRAMUSCULAR; INTRAVENOUS ONCE AS NEEDED
Status: DISCONTINUED | OUTPATIENT
Start: 2018-11-14 | End: 2018-11-15

## 2018-11-14 RX ADMIN — SODIUM CHLORIDE, SODIUM LACTATE, POTASSIUM CHLORIDE, AND CALCIUM CHLORIDE 125 ML/HR: .6; .31; .03; .02 INJECTION, SOLUTION INTRAVENOUS at 12:31

## 2018-11-14 RX ADMIN — SODIUM CHLORIDE, SODIUM LACTATE, POTASSIUM CHLORIDE, AND CALCIUM CHLORIDE 125 ML/HR: .6; .31; .03; .02 INJECTION, SOLUTION INTRAVENOUS at 11:16

## 2018-11-14 RX ADMIN — LIDOCAINE HYDROCHLORIDE AND EPINEPHRINE 5 ML: 15; 5 INJECTION, SOLUTION EPIDURAL at 15:35

## 2018-11-14 RX ADMIN — ROPIVACAINE HYDROCHLORIDE: 2 INJECTION, SOLUTION EPIDURAL; INFILTRATION at 15:50

## 2018-11-14 RX ADMIN — Medication 2 UNITS: at 21:27

## 2018-11-14 RX ADMIN — ROPIVACAINE HYDROCHLORIDE: 2 INJECTION, SOLUTION EPIDURAL; INFILTRATION at 21:12

## 2018-11-14 RX ADMIN — CHLOROPROCAINE HYDROCHLORIDE 10 ML: 30 INJECTION, SOLUTION EPIDURAL; INFILTRATION at 20:28

## 2018-11-14 NOTE — OB LABOR/OXYTOCIN SAFETY PROGRESS
Labor Progress Note - Maia Naidu 29 y o  female MRN: [id number]    Unit/Bed#: -01 Encounter: [id number]    Obstetric History       T0      L0     SAB0   TAB0   Ectopic0   Multiple0   Live Births0      Gestational Age: 41w1d     Contraction Frequency (minutes): 1 5-4 5  Contraction Quality: Moderate  Tachysystole: No   Dilation: 4        Effacement (%): 80  Station: -2  Baseline Rate: 135 bpm  Fetal Heart Rate: 150 BPM  FHR Category: Category I          Notes/comments:   Patient comfortable, not complaining of increased pressure  Currently off the monitor after taking a walk  Not requiring analgesia      SVE deferred as patient does not feel increasingly uncomfortable and patient has not SROM          Pete Salinas MD 2018 1:04 PM

## 2018-11-14 NOTE — H&P
H&P Exam - Obstetrics   Yuli Redman 29 y o  female MRN: [id number]  Unit/Bed#: LD Triage  Encounter: [id number]    Assessment/Plan     History of Present Illness   Chief Complaint: contractions    HPI:  Yuli Redman is a 29 y o   female with an MAGGY of 2018, by Ultrasound at 41w1d weeks gestation who is being admitted for Active labor  Contractions: Date/time of onset: started today at 0200  Leakage of fluid: None  Bleeding: None  Fetal movement: present  Pregnancy complications: obesity  Review of Systems   Constitutional: Negative for activity change and appetite change  Gastrointestinal: Positive for abdominal pain  + contractions   All other systems reviewed and are negative  Historical Information   OB History    Para Term  AB Living   1 0 0 0 0 0   SAB TAB Ectopic Multiple Live Births   0 0 0 0 0      # Outcome Date GA Lbr Beau/2nd Weight Sex Delivery Anes PTL Lv   1 Current                   Past Medical History:   Diagnosis Date    Varicella     vacc     Past Surgical History:   Procedure Laterality Date    NO PAST SURGERIES      WISDOM TOOTH EXTRACTION           Social History   History   Alcohol Use No     History   Drug Use No     History   Smoking Status    Never Smoker   Smokeless Tobacco    Never Used     Family History: non-contributory    Meds/Allergies   all medications and allergies reviewed  No Known Allergies    Objective   Vitals: Blood pressure 130/79, pulse (!) 119, temperature 98 3 °F (36 8 °C), temperature source Oral, resp  rate 18, last menstrual period 2018, SpO2 100 %, currently breastfeeding  There is no height or weight on file to calculate BMI  Invasive Devices          No matching active lines, drains, or airways          Physical Exam   Constitutional: She is oriented to person, place, and time  She appears well-developed and well-nourished  HENT:   Head: Normocephalic and atraumatic     Neck: Normal range of motion  Neck supple  Cardiovascular: Normal rate, regular rhythm and normal heart sounds  Pulmonary/Chest: Effort normal and breath sounds normal  She has no wheezes  She has no rales  Abdominal: Soft  She exhibits no distension  There is tenderness  There is no rebound and no guarding  gravid   Genitourinary:   Genitourinary Comments: cx 80/4/-2/vtx with BBOW   Musculoskeletal: Normal range of motion  Neurological: She is alert and oriented to person, place, and time  She has normal reflexes  Skin: Skin is warm and dry  Psychiatric: She has a normal mood and affect  Her behavior is normal  Judgment and thought content normal        Prenatal Labs: I have personally reviewed pertinent reports    GBS negative      Assessment: 28 yo  with IUP @ 41 weeks in labor, GBS negative    Plan:  Admit  Epidural PRN  Expect

## 2018-11-14 NOTE — TELEPHONE ENCOUNTER
Pt , seble garcia,  1990  edc 11/6/18   She is 41 wks G1, called stating, "I/ve been having cxtns 7 min apart since 0200, lasting 45-60 sec & some have been stronger than others "  Send to L & D? Please advise   TT to Dr Lillie Espinosa, ob on call

## 2018-11-14 NOTE — ANESTHESIA PREPROCEDURE EVALUATION
Review of Systems/Medical History          Cardiovascular  Negative cardio ROS    Pulmonary  Negative pulmonary ROS        GI/Hepatic    GERD (with pregnancy) ,        Negative  ROS        Endo/Other    Obesity ([facility] 44)  super morbid obesity   GYN  Currently pregnant , Prior pregnancy/OB history : 1 Parity: 0,          Hematology  Negative hematology ROS      Musculoskeletal  Negative musculoskeletal ROS        Neurology  Negative neurology ROS      Psychology   Negative psychology ROS              Physical Exam    Airway    Mallampati score: III  TM Distance: >3 FB  Neck ROM: full     Dental   No notable dental hx     Cardiovascular  Comment: Negative ROS,     Pulmonary      Other Findings        Anesthesia Plan  ASA Score- 3     Anesthesia Type- epidural with ASA Monitors  Additional Monitors:   Airway Plan:         Plan Factors-    Induction-     Postoperative Plan-     Informed Consent- Anesthetic plan and risks discussed with patient

## 2018-11-14 NOTE — OB LABOR/OXYTOCIN SAFETY PROGRESS
Labor Progress Note - Peter Job 29 y o  female MRN: [id number]    Unit/Bed#: -01 Encounter: [id number]    Obstetric History       T0      L0     SAB0   TAB0   Ectopic0   Multiple0   Live Births0      Gestational Age: 40w1d     Contraction Frequency (minutes): 1-3 5 (nt tracing well at times)  Contraction Quality: Moderate  Tachysystole: No   Dilation: 5        Effacement (%): 80  Station: -1  Baseline Rate: 138 bpm (tracing mom at times while on birthing ball)  Fetal Heart Rate: 140 BPM  FHR Category: Category I          Notes/comments:   Patient requesting epidural    Cervical exam as listed above    FHT category 1, one change when coming off the yoga ball          Tera Whitfield MD 2018 3:54 PM

## 2018-11-14 NOTE — ANESTHESIA PROCEDURE NOTES
Epidural Block    Patient location during procedure: OB  Start time: 11/14/2018 3:27 PM  Reason for block: procedure for pain, at surgeon's request and primary anesthetic  Staffing  Anesthesiologist: MONISHA Amaya  Performed: anesthesiologist   Preanesthetic Checklist  Completed: patient identified, site marked, surgical consent, pre-op evaluation, timeout performed, IV checked, risks and benefits discussed and monitors and equipment checked  Epidural  Patient position: sitting  Prep: Betadine  Patient monitoring: heart rate, cardiac monitor, continuous pulse ox and frequent blood pressure checks  Approach: midline  Location: lumbar (1-5)  Injection technique: EB air  Needle  Needle type: Tuohy   Needle gauge: 18 G  Catheter at skin depth: 12 cm  Test dose: negative and lidocaine 1 5% with epinephrine 1-to-200,000  Assessment  Sensory level: [id number] aspiration for CSF, negative aspiration for heme and no paresthesia on injection  patient tolerated the procedure well with no immediate complications  Additional Notes  L3-4, one attempt  EB at 6 cm, taped to skin at 12 cm  Secured with statlock, tegaderm, two inch tape

## 2018-11-14 NOTE — TELEPHONE ENCOUNTER
Per dr han, pt is to report to L & D- informed pt  She will go to [facility]

## 2018-11-14 NOTE — OB LABOR/OXYTOCIN SAFETY PROGRESS
Labor Progress Note - Army Grate 29 y o  female MRN: [id number]    Unit/Bed#: -01 Encounter: [id number]    Obstetric History       T0      L0     SAB0   TAB0   Ectopic0   Multiple0   Live Births0      Gestational Age: 41w1d     Contraction Frequency (minutes): 2-3 5  Contraction Quality: Moderate  Tachysystole: No   Dilation: 5-6        Effacement (%): 80  Station: -1  Baseline Rate: 135 bpm  Fetal Heart Rate: (P) 145 BPM  FHR Category: Category I          Notes/comments:     AROM for thin meconium at 1730  SVE as above  Cat I tracing  Pt had severe range /96 while room, will recheck in 15mins and order pre-eclampsia labs  Also, pt tachycardic, persistent after IVF bolus  Pt denies symptoms  Will continue to monitor closely  1923: Repeat BP 15 mins later was 131/76        Paul Falk MD 2018 5:35 PM

## 2018-11-15 LAB
ALBUMIN SERPL BCP-MCNC: 2.3 G/DL (ref 3.5–5)
ALP SERPL-CCNC: 173 U/L (ref 46–116)
ALT SERPL W P-5'-P-CCNC: 18 U/L (ref 12–78)
ANION GAP SERPL CALCULATED.3IONS-SCNC: 8 MMOL/L (ref 4–13)
AST SERPL W P-5'-P-CCNC: 18 U/L (ref 5–45)
BASE EXCESS BLDCOA CALC-SCNC: -2.7 MMOL/L (ref 3–11)
BASE EXCESS BLDCOV CALC-SCNC: -3.4 MMOL/L (ref 1–9)
BILIRUB SERPL-MCNC: 1.22 MG/DL (ref 0.2–1)
BUN SERPL-MCNC: 8 MG/DL (ref 5–25)
CALCIUM SERPL-MCNC: 8.1 MG/DL (ref 8.3–10.1)
CHLORIDE SERPL-SCNC: 104 MMOL/L (ref 100–108)
CO2 SERPL-SCNC: 20 MMOL/L (ref 21–32)
CREAT SERPL-MCNC: 0.75 MG/DL (ref 0.6–1.3)
CREAT UR-MCNC: 79 MG/DL
ERYTHROCYTE [DISTWIDTH] IN BLOOD BY AUTOMATED COUNT: 14.3 % (ref 11.6–15.1)
GFR SERPL CREATININE-BSD FRML MDRD: 109 ML/MIN/1.73SQ M
GLUCOSE SERPL-MCNC: 124 MG/DL (ref 65–140)
HCO3 BLDCOA-SCNC: 25.1 MMOL/L (ref 17.3–27.3)
HCO3 BLDCOV-SCNC: 21.9 MMOL/L (ref 12.2–28.6)
HCT VFR BLD AUTO: 37.4 % (ref 34.8–46.1)
HGB BLD-MCNC: 12.2 G/DL (ref 11.5–15.4)
MCH RBC QN AUTO: 27.2 PG (ref 26.8–34.3)
MCHC RBC AUTO-ENTMCNC: 32.6 G/DL (ref 31.4–37.4)
MCV RBC AUTO: 83 FL (ref 82–98)
O2 CT VFR BLDCOA CALC: 4.3 ML/DL
OXYHGB MFR BLDCOA: 19.1 %
OXYHGB MFR BLDCOV: 60.2 %
PCO2 BLDCOA: 55.2 MM[HG] (ref 30–60)
PCO2 BLDCOV: 40.4 MM HG (ref 27–43)
PH BLDCOA: 7.28 [PH] (ref 7.23–7.43)
PH BLDCOV: 7.35 [PH] (ref 7.19–7.49)
PLATELET # BLD AUTO: 260 THOUSANDS/UL (ref 149–390)
PMV BLD AUTO: 10.8 FL (ref 8.9–12.7)
PO2 BLDCOA: 12.2 MM HG (ref 5–25)
PO2 BLDCOV: 25.9 MM HG (ref 15–45)
POTASSIUM SERPL-SCNC: 3.6 MMOL/L (ref 3.5–5.3)
PROT SERPL-MCNC: 6.4 G/DL (ref 6.4–8.2)
PROT UR-MCNC: 30 MG/DL
PROT/CREAT UR: 0.38 MG/G{CREAT} (ref 0–0.1)
RBC # BLD AUTO: 4.49 MILLION/UL (ref 3.81–5.12)
SAO2 % BLDCOV: 13.6 ML/DL
SODIUM SERPL-SCNC: 132 MMOL/L (ref 136–145)
WBC # BLD AUTO: 18.88 THOUSAND/UL (ref 4.31–10.16)

## 2018-11-15 PROCEDURE — 82805 BLOOD GASES W/O2 SATURATION: CPT | Performed by: OBSTETRICS & GYNECOLOGY

## 2018-11-15 PROCEDURE — 88307 TISSUE EXAM BY PATHOLOGIST: CPT | Performed by: PATHOLOGY

## 2018-11-15 PROCEDURE — 84156 ASSAY OF PROTEIN URINE: CPT | Performed by: OBSTETRICS & GYNECOLOGY

## 2018-11-15 PROCEDURE — 85027 COMPLETE CBC AUTOMATED: CPT | Performed by: OBSTETRICS & GYNECOLOGY

## 2018-11-15 PROCEDURE — 82570 ASSAY OF URINE CREATININE: CPT | Performed by: OBSTETRICS & GYNECOLOGY

## 2018-11-15 PROCEDURE — 59510 CESAREAN DELIVERY: CPT | Performed by: OBSTETRICS & GYNECOLOGY

## 2018-11-15 PROCEDURE — 80053 COMPREHEN METABOLIC PANEL: CPT | Performed by: OBSTETRICS & GYNECOLOGY

## 2018-11-15 PROCEDURE — 94762 N-INVAS EAR/PLS OXIMTRY CONT: CPT

## 2018-11-15 RX ORDER — CLONIDINE 100 UG/ML
INJECTION, SOLUTION EPIDURAL AS NEEDED
Status: DISCONTINUED | OUTPATIENT
Start: 2018-11-15 | End: 2018-11-15 | Stop reason: SURG

## 2018-11-15 RX ORDER — LABETALOL HYDROCHLORIDE 5 MG/ML
INJECTION, SOLUTION INTRAVENOUS
Status: COMPLETED
Start: 2018-11-15 | End: 2018-11-15

## 2018-11-15 RX ORDER — NALBUPHINE HCL 10 MG/ML
5 AMPUL (ML) INJECTION
Status: DISPENSED | OUTPATIENT
Start: 2018-11-15 | End: 2018-11-16

## 2018-11-15 RX ORDER — DIPHENHYDRAMINE HYDROCHLORIDE 50 MG/ML
25 INJECTION INTRAMUSCULAR; INTRAVENOUS EVERY 6 HOURS PRN
Status: DISPENSED | OUTPATIENT
Start: 2018-11-15 | End: 2018-11-16

## 2018-11-15 RX ORDER — ONDANSETRON 2 MG/ML
4 INJECTION INTRAMUSCULAR; INTRAVENOUS ONCE AS NEEDED
Status: DISCONTINUED | OUTPATIENT
Start: 2018-11-15 | End: 2018-11-17 | Stop reason: HOSPADM

## 2018-11-15 RX ORDER — OXYCODONE HYDROCHLORIDE AND ACETAMINOPHEN 5; 325 MG/1; MG/1
1 TABLET ORAL EVERY 4 HOURS PRN
Status: DISCONTINUED | OUTPATIENT
Start: 2018-11-16 | End: 2018-11-17 | Stop reason: HOSPADM

## 2018-11-15 RX ORDER — METOCLOPRAMIDE HYDROCHLORIDE 5 MG/ML
5 INJECTION INTRAMUSCULAR; INTRAVENOUS EVERY 6 HOURS PRN
Status: ACTIVE | OUTPATIENT
Start: 2018-11-15 | End: 2018-11-16

## 2018-11-15 RX ORDER — BUPIVACAINE HYDROCHLORIDE 7.5 MG/ML
INJECTION, SOLUTION INTRASPINAL AS NEEDED
Status: DISCONTINUED | OUTPATIENT
Start: 2018-11-15 | End: 2018-11-15 | Stop reason: SURG

## 2018-11-15 RX ORDER — ACETAMINOPHEN 325 MG/1
650 TABLET ORAL EVERY 6 HOURS PRN
Status: DISCONTINUED | OUTPATIENT
Start: 2018-11-15 | End: 2018-11-17 | Stop reason: HOSPADM

## 2018-11-15 RX ORDER — NALOXONE HYDROCHLORIDE 0.4 MG/ML
0.1 INJECTION, SOLUTION INTRAMUSCULAR; INTRAVENOUS; SUBCUTANEOUS
Status: ACTIVE | OUTPATIENT
Start: 2018-11-15 | End: 2018-11-16

## 2018-11-15 RX ORDER — ONDANSETRON 2 MG/ML
4 INJECTION INTRAMUSCULAR; INTRAVENOUS EVERY 4 HOURS PRN
Status: ACTIVE | OUTPATIENT
Start: 2018-11-15 | End: 2018-11-16

## 2018-11-15 RX ORDER — IBUPROFEN 600 MG/1
600 TABLET ORAL EVERY 6 HOURS PRN
Status: DISCONTINUED | OUTPATIENT
Start: 2018-11-16 | End: 2018-11-17 | Stop reason: HOSPADM

## 2018-11-15 RX ORDER — FENTANYL CITRATE/PF 50 MCG/ML
25 SYRINGE (ML) INJECTION
Status: DISCONTINUED | OUTPATIENT
Start: 2018-11-15 | End: 2018-11-15

## 2018-11-15 RX ORDER — ACETAMINOPHEN 325 MG/1
650 TABLET ORAL EVERY 6 HOURS
Status: DISCONTINUED | OUTPATIENT
Start: 2018-11-15 | End: 2018-11-17 | Stop reason: HOSPADM

## 2018-11-15 RX ORDER — DIPHENHYDRAMINE HCL 25 MG
25 TABLET ORAL EVERY 6 HOURS PRN
Status: DISCONTINUED | OUTPATIENT
Start: 2018-11-15 | End: 2018-11-17 | Stop reason: HOSPADM

## 2018-11-15 RX ORDER — OXYCODONE HYDROCHLORIDE AND ACETAMINOPHEN 5; 325 MG/1; MG/1
2 TABLET ORAL EVERY 4 HOURS PRN
Status: DISCONTINUED | OUTPATIENT
Start: 2018-11-16 | End: 2018-11-17 | Stop reason: HOSPADM

## 2018-11-15 RX ORDER — LABETALOL HYDROCHLORIDE 5 MG/ML
80 INJECTION, SOLUTION INTRAVENOUS ONCE
Status: COMPLETED | OUTPATIENT
Start: 2018-11-15 | End: 2018-11-15

## 2018-11-15 RX ORDER — DOCUSATE SODIUM 100 MG/1
100 CAPSULE, LIQUID FILLED ORAL 2 TIMES DAILY
Status: DISCONTINUED | OUTPATIENT
Start: 2018-11-15 | End: 2018-11-17 | Stop reason: HOSPADM

## 2018-11-15 RX ORDER — OXYTOCIN/RINGER'S LACTATE 30/500 ML
62.5 PLASTIC BAG, INJECTION (ML) INTRAVENOUS CONTINUOUS
Status: DISPENSED | OUTPATIENT
Start: 2018-11-15 | End: 2018-11-15

## 2018-11-15 RX ORDER — SODIUM CHLORIDE, SODIUM LACTATE, POTASSIUM CHLORIDE, CALCIUM CHLORIDE 600; 310; 30; 20 MG/100ML; MG/100ML; MG/100ML; MG/100ML
100 INJECTION, SOLUTION INTRAVENOUS CONTINUOUS
Status: DISCONTINUED | OUTPATIENT
Start: 2018-11-15 | End: 2018-11-16

## 2018-11-15 RX ORDER — ROPIVACAINE HYDROCHLORIDE 2 MG/ML
INJECTION, SOLUTION EPIDURAL; INFILTRATION; PERINEURAL AS NEEDED
Status: DISCONTINUED | OUTPATIENT
Start: 2018-11-15 | End: 2018-11-15 | Stop reason: SURG

## 2018-11-15 RX ORDER — DEXAMETHASONE SODIUM PHOSPHATE 4 MG/ML
8 INJECTION, SOLUTION INTRA-ARTICULAR; INTRALESIONAL; INTRAMUSCULAR; INTRAVENOUS; SOFT TISSUE ONCE AS NEEDED
Status: ACTIVE | OUTPATIENT
Start: 2018-11-15 | End: 2018-11-16

## 2018-11-15 RX ORDER — SIMETHICONE 80 MG
80 TABLET,CHEWABLE ORAL 4 TIMES DAILY PRN
Status: DISCONTINUED | OUTPATIENT
Start: 2018-11-15 | End: 2018-11-17 | Stop reason: HOSPADM

## 2018-11-15 RX ORDER — OXYTOCIN/RINGER'S LACTATE 30/500 ML
PLASTIC BAG, INJECTION (ML) INTRAVENOUS CONTINUOUS PRN
Status: DISCONTINUED | OUTPATIENT
Start: 2018-11-15 | End: 2018-11-15 | Stop reason: SURG

## 2018-11-15 RX ORDER — HYDROMORPHONE HCL/PF 1 MG/ML
0.4 SYRINGE (ML) INJECTION
Status: DISCONTINUED | OUTPATIENT
Start: 2018-11-15 | End: 2018-11-15

## 2018-11-15 RX ORDER — KETOROLAC TROMETHAMINE 30 MG/ML
30 INJECTION, SOLUTION INTRAMUSCULAR; INTRAVENOUS EVERY 6 HOURS
Status: COMPLETED | OUTPATIENT
Start: 2018-11-15 | End: 2018-11-16

## 2018-11-15 RX ORDER — LABETALOL HYDROCHLORIDE 5 MG/ML
20 INJECTION, SOLUTION INTRAVENOUS ONCE
Status: COMPLETED | OUTPATIENT
Start: 2018-11-15 | End: 2018-11-15

## 2018-11-15 RX ORDER — LABETALOL HYDROCHLORIDE 5 MG/ML
20 INJECTION, SOLUTION INTRAVENOUS ONCE
Status: DISCONTINUED | OUTPATIENT
Start: 2018-11-15 | End: 2018-11-15

## 2018-11-15 RX ORDER — HYDROMORPHONE HCL/PF 1 MG/ML
0.5 SYRINGE (ML) INJECTION
Status: DISCONTINUED | OUTPATIENT
Start: 2018-11-15 | End: 2018-11-17 | Stop reason: HOSPADM

## 2018-11-15 RX ORDER — MORPHINE SULFATE 0.5 MG/ML
INJECTION, SOLUTION EPIDURAL; INTRATHECAL; INTRAVENOUS AS NEEDED
Status: DISCONTINUED | OUTPATIENT
Start: 2018-11-15 | End: 2018-11-15 | Stop reason: SURG

## 2018-11-15 RX ORDER — HYDRALAZINE HYDROCHLORIDE 20 MG/ML
INJECTION INTRAMUSCULAR; INTRAVENOUS
Status: COMPLETED
Start: 2018-11-15 | End: 2018-11-15

## 2018-11-15 RX ORDER — HYDRALAZINE HYDROCHLORIDE 20 MG/ML
10 INJECTION INTRAMUSCULAR; INTRAVENOUS ONCE
Status: COMPLETED | OUTPATIENT
Start: 2018-11-15 | End: 2018-11-15

## 2018-11-15 RX ORDER — SODIUM CHLORIDE, SODIUM LACTATE, POTASSIUM CHLORIDE, CALCIUM CHLORIDE 600; 310; 30; 20 MG/100ML; MG/100ML; MG/100ML; MG/100ML
125 INJECTION, SOLUTION INTRAVENOUS CONTINUOUS
Status: DISCONTINUED | OUTPATIENT
Start: 2018-11-15 | End: 2018-11-16

## 2018-11-15 RX ORDER — ONDANSETRON 2 MG/ML
4 INJECTION INTRAMUSCULAR; INTRAVENOUS EVERY 8 HOURS PRN
Status: DISCONTINUED | OUTPATIENT
Start: 2018-11-15 | End: 2018-11-17 | Stop reason: HOSPADM

## 2018-11-15 RX ORDER — LABETALOL HYDROCHLORIDE 5 MG/ML
40 INJECTION, SOLUTION INTRAVENOUS ONCE
Status: COMPLETED | OUTPATIENT
Start: 2018-11-15 | End: 2018-11-15

## 2018-11-15 RX ORDER — METOCLOPRAMIDE HYDROCHLORIDE 5 MG/ML
10 INJECTION INTRAMUSCULAR; INTRAVENOUS ONCE AS NEEDED
Status: DISCONTINUED | OUTPATIENT
Start: 2018-11-15 | End: 2018-11-17 | Stop reason: HOSPADM

## 2018-11-15 RX ORDER — CALCIUM CARBONATE 200(500)MG
1000 TABLET,CHEWABLE ORAL DAILY PRN
Status: DISCONTINUED | OUTPATIENT
Start: 2018-11-15 | End: 2018-11-17 | Stop reason: HOSPADM

## 2018-11-15 RX ORDER — HYDROMORPHONE HCL/PF 1 MG/ML
1 SYRINGE (ML) INJECTION EVERY 2 HOUR PRN
Status: DISCONTINUED | OUTPATIENT
Start: 2018-11-16 | End: 2018-11-17 | Stop reason: HOSPADM

## 2018-11-15 RX ORDER — PROMETHAZINE HYDROCHLORIDE 25 MG/ML
12.5 INJECTION, SOLUTION INTRAMUSCULAR; INTRAVENOUS ONCE AS NEEDED
Status: DISCONTINUED | OUTPATIENT
Start: 2018-11-15 | End: 2018-11-17 | Stop reason: HOSPADM

## 2018-11-15 RX ORDER — DIAPER,BRIEF,INFANT-TODD,DISP
1 EACH MISCELLANEOUS DAILY PRN
Status: DISCONTINUED | OUTPATIENT
Start: 2018-11-15 | End: 2018-11-17 | Stop reason: HOSPADM

## 2018-11-15 RX ADMIN — KETOROLAC TROMETHAMINE 30 MG: 30 INJECTION, SOLUTION INTRAMUSCULAR at 19:50

## 2018-11-15 RX ADMIN — KETOROLAC TROMETHAMINE 30 MG: 30 INJECTION, SOLUTION INTRAMUSCULAR at 13:05

## 2018-11-15 RX ADMIN — LABETALOL HYDROCHLORIDE 40 MG: 5 INJECTION, SOLUTION INTRAVENOUS at 07:05

## 2018-11-15 RX ADMIN — SODIUM CHLORIDE, SODIUM LACTATE, POTASSIUM CHLORIDE, AND CALCIUM CHLORIDE: .6; .31; .03; .02 INJECTION, SOLUTION INTRAVENOUS at 13:11

## 2018-11-15 RX ADMIN — SODIUM CHLORIDE, SODIUM LACTATE, POTASSIUM CHLORIDE, AND CALCIUM CHLORIDE 125 ML/HR: .6; .31; .03; .02 INJECTION, SOLUTION INTRAVENOUS at 14:15

## 2018-11-15 RX ADMIN — LABETALOL 20 MG/4 ML (5 MG/ML) INTRAVENOUS SYRINGE 20 MG: at 06:42

## 2018-11-15 RX ADMIN — Medication 62.5 MILLI-UNITS/MIN: at 15:16

## 2018-11-15 RX ADMIN — CLONIDINE 100 MCG: 100 INJECTION, SOLUTION EPIDURAL at 10:39

## 2018-11-15 RX ADMIN — HYDRALAZINE HYDROCHLORIDE 10 MG: 20 INJECTION INTRAMUSCULAR; INTRAVENOUS at 07:53

## 2018-11-15 RX ADMIN — LABETALOL 20 MG/4 ML (5 MG/ML) INTRAVENOUS SYRINGE 40 MG: at 07:05

## 2018-11-15 RX ADMIN — LABETALOL HYDROCHLORIDE 80 MG: 5 INJECTION, SOLUTION INTRAVENOUS at 07:25

## 2018-11-15 RX ADMIN — SODIUM CHLORIDE, SODIUM LACTATE, POTASSIUM CHLORIDE, AND CALCIUM CHLORIDE 125 ML/HR: .6; .31; .03; .02 INJECTION, SOLUTION INTRAVENOUS at 03:28

## 2018-11-15 RX ADMIN — ROPIVACAINE HYDROCHLORIDE 9 ML: 2 INJECTION, SOLUTION EPIDURAL; INFILTRATION at 10:39

## 2018-11-15 RX ADMIN — SODIUM CHLORIDE, SODIUM LACTATE, POTASSIUM CHLORIDE, AND CALCIUM CHLORIDE 125 ML/HR: .6; .31; .03; .02 INJECTION, SOLUTION INTRAVENOUS at 19:49

## 2018-11-15 RX ADMIN — LABETALOL 20 MG/4 ML (5 MG/ML) INTRAVENOUS SYRINGE 80 MG: at 07:25

## 2018-11-15 RX ADMIN — DIPHENHYDRAMINE HYDROCHLORIDE 25 MG: 50 INJECTION, SOLUTION INTRAMUSCULAR; INTRAVENOUS at 15:15

## 2018-11-15 RX ADMIN — AZITHROMYCIN 500 MG: 500 INJECTION, POWDER, LYOPHILIZED, FOR SOLUTION INTRAVENOUS at 12:30

## 2018-11-15 RX ADMIN — ROPIVACAINE HYDROCHLORIDE: 2 INJECTION, SOLUTION EPIDURAL; INFILTRATION at 04:06

## 2018-11-15 RX ADMIN — SODIUM CHLORIDE, SODIUM LACTATE, POTASSIUM CHLORIDE, AND CALCIUM CHLORIDE 125 ML/HR: .6; .31; .03; .02 INJECTION, SOLUTION INTRAVENOUS at 01:09

## 2018-11-15 RX ADMIN — BUPIVACAINE HYDROCHLORIDE IN DEXTROSE 1.5 ML: 7.5 INJECTION, SOLUTION SUBARACHNOID at 12:34

## 2018-11-15 RX ADMIN — ROPIVACAINE HYDROCHLORIDE: 2 INJECTION, SOLUTION EPIDURAL; INFILTRATION at 07:36

## 2018-11-15 RX ADMIN — MORPHINE SULFATE 0.25 MG: 0.5 INJECTION, SOLUTION EPIDURAL; INTRATHECAL; INTRAVENOUS at 12:34

## 2018-11-15 RX ADMIN — DEXAMETHASONE SODIUM PHOSPHATE 4 MG: 4 INJECTION, SOLUTION INTRAMUSCULAR; INTRAVENOUS at 13:09

## 2018-11-15 RX ADMIN — Medication 250 MILLI-UNITS/MIN: at 12:49

## 2018-11-15 RX ADMIN — CEFAZOLIN SODIUM 2000 MG: 2 SOLUTION INTRAVENOUS at 12:45

## 2018-11-15 RX ADMIN — ONDANSETRON 4 MG: 2 INJECTION INTRAMUSCULAR; INTRAVENOUS at 13:09

## 2018-11-15 NOTE — ANESTHESIA POSTPROCEDURE EVALUATION
Post-Op Assessment Note      CV Status:  Stable    Mental Status:  Awake    Hydration Status:  Stable    PONV Controlled:  None    Airway Patency:  Patent    Post Op Vitals Reviewed: Yes          Staff: Anesthesiologist     Post-op block assessment: catheter intact and no complications        /12 (11/15/18 1329)    Temp 97 8 °F (36 6 °C) (11/15/18 1329)    Pulse 85 (11/15/18 1329)   Resp 16 (11/15/18 1329)    SpO2 95 % (11/15/18 1329)

## 2018-11-15 NOTE — OP NOTE
OPERATIVE REPORT  PATIENT NAME: Maia Naidu    :  1990  MRN: [id number]  Pt Location: [facility] OR ROOM 02    SURGERY DATE: 11/15/2018    Surgeon(s) and Role:     * Adriane Regalado DO - Primary     * Daron Kolb MD - Assisting    Preop Diagnosis:  Elective surgery [Z41 9]    Post-Op Diagnosis Codes:     * Elective surgery [Z41 9]    Procedure(s) (LRB):   SECTION () (N/A)    Specimen(s):  ID Type Source Tests Collected by Time Destination   1 :  Tissue (Placenta on Hold) OB Only Placenta TISSUE EXAM OB (PLACENTA) ONLY Adriane Regalado, DO 11/15/2018 1253    A :  Cord Blood Umbilical cord CORD BLOOD HOLD Adriane Regalado, DO 11/15/2018 1250    B :  Cord Blood Cord BLOOD GAS, VENOUS, CORD, BLOOD GAS, ARTERIAL, CORD Adriane Regalado DO 11/15/2018 1252        Estimated Blood Loss:   700ml    Drains:  Urethral Catheter Non-latex 16 Fr  (Active)   Amt returned on insertion(mL) 50 mL 2018  5:15 PM   Site Assessment Clean;Skin intact 2018  5:15 PM   Collection Container Standard drainage bag 2018  5:15 PM   Securement Method Securing device (Describe) 2018  5:15 PM   Output (mL) 400 mL 11/15/2018 10:51 AM   Number of days: 1       Anesthesia Type:   spinal    Operative Indications:  Elective surgery [Z41 9]  Maternal request for C/S    Operative Findings:  Nl uterus, tubes, ovaries    Complications:   None    Procedure and Technique:  Patient was identified prior to being taken back to the operating suite  She had labored and reached 9 cm dilation  She was found to be right occiput transverse she was frustrated and feeling pain and was considering   We reviewed the risks and benefits  Patient was allowed to  with her  for sometime and upon requesting her she chose to proceed to primary  section  She had already had epidural in place but this was not fully effective  Kelly catheter was already in place as well      Patient was taken the operating suite and underwent successful induction of spinal anesthesia  She was placed in the dorsal supine position  Gant catheter is draining concentrated clear urine  She had heart tones checked which were found to be reassuring  She underwent ChloraPrep abdominal prep and had pneumatic compression boots placed  She has then draped in a sterile fashion after prep was dried  A operative time-out was then accomplished  Patient had received azithromycin intravenously as well as cefazolin   Section Procedure Note      Pre-operative Diagnosis: 41 week 2 day pregnancy  Maternal request for  section             Fetal malposition    Post-operative Diagnosis: same               Viable female                Meconium fluid    Procedure(s) performed:  Primary LTCS    Surgeon: Melissa Delacruz DO      Assistant(s): Ekonimidis    Findings:    Viable female weighing 9lbs 2oz;  Apgar scores of 9 at one minute and 9 at five minutes  meconium amniotic fluid  Normal uterus, tubes, and ovaries  Normal placenta with 3-vessel cord    Specimens: Arterial and venous cord gases, cord blood, segment of umbilical cord, placenta to pathology storage     Estimated Blood Loss:  700 mL    Drains: gant, concentrated, clear           Complications:  None; patient tolerated the procedure well  Disposition: PACU            Condition: stable    Procedure Details         The patient was seen prior to the procedure  Risks, benefits, possible complications, alternate treatment options, and expected outcomes were discussed with the patient  The patient agreed with the proposed plan and gave informed consent  The patient was taken to [facility] Operating Room  She had already received appropriate spinal anesthesia  Fetal heart tones had been assessed and a Gant catheter and SCDs were in place  Betadine vaginal prep was accomplished     The abdomen was prepped with Chloraprep and following appropriate drying time, the patient was draped in the usual sterile manner  A Time Out was held and the above information confirmed  The patient was identified as Barbi Sorto and the procedure verified as  Delivery  The patient had received Ancef /azithro for prophylaxis  A Pfannenstiel incision was made and carried down through the underlying subcutaneous tissue to the fascia using a scalpel  Rectus fascia was then incised  We then proceeded in Kentrell-Borjas fashion  All anatomic layers were well-demarcated  The rectus muscles were  and the peritoneum was identified, entered, and extended longitudinally with blunt dissection  The bladder blade was inserted  A low transverse uterine incision was made with the scalpel and extended laterally with blunt dissection  The amnion was entered bluntly  Meconium-stained fluid was noted     The fetal head was palpated, elevated, and delivered through the uterine incision followed by the body without difficulty  Baby had spontaneous cry with good color and tone  Delayed cord clamping was employed  The umbilical cord was clamped and cut  The infant was handed off to the  providers  Pitocin infusion was begun  Arterial and venous cord gases, cord blood, and a segment of umbilical cord were obtained for evaluation  The placenta delivered spontaneously with uterine fundal massage and appeared normal  The uterus was exteriorized and curretted with a moist lap sponge  Uterus, tubes and ovaries appeared normal   The uterine incision was closed with a running locked suture of 0 Vicryl  And extension through the left uterine vascular pedicle was noted  This was clamped  A window was created in the left broad ligament and a suture ligature of 0 Vicryl suture was passed medial through the myometrium in anterior posterior fashion incorporating the left uterine vascular pedicle below the level of the hysterotomy    This was in the standard O Collyer stitch fashion  Excellent hemostasis was noted  A second layer of the same suture was used to imbricate the first   Hemostasis was noted to be excellent  The uterus was returned to the abdomen  The gutters were inspected and cleared of all clots and debris  The fascia was closed with a running suture of 0 Vicryl  Subcutaneous tissues were closed with 2-0 plain suture     The skin was closed with surgical steel staples  Sterile dressing was applied  An abdominal binder was then placed    ApH:7 [id number]      BE:-2 7           AQR:4 093        BE:-3 4    The patient appeared to tolerate the procedure very well  Lap sponge, needle, and instrument counts were correct x2  The patient was transferred to her postpartum recovery room in stable condition and her infant went to the  nursery  Attending Attestation: I was present for the entire procedure                   I was present for the entire procedure    Patient Disposition:  PACU     SIGNATURE: Antoinette Alanis DO  DATE: November 15, 2018  TIME: 1:35 PM

## 2018-11-15 NOTE — OB LABOR/OXYTOCIN SAFETY PROGRESS
Oxytocin Safety Progress Check Note - Marlena Figueroa 29 y o  female MRN: [id number]    Unit/Bed#: -01 Encounter: [id number]    Obstetric History       T0      L0     SAB0   TAB0   Ectopic0   Multiple0   Live Births0      Gestational Age: 38w3d  Dose (osmany-units/min) Oxytocin: 6 osmany-units/min  Contraction Frequency (minutes): 2-3  Contraction Quality: Moderate  Tachysystole: No   Dilation: Lip/rim (Comment)        Effacement (%): 100  Station: 1  Baseline Rate: 135 bpm  Fetal Heart Rate: 135 BPM  FHR Category: Category I          Notes/comments:   Patient extremely uncomfortable urine with epidural, had a window on the right side  Feels ready to push, cervical exam as described above  Patient having some early deceleration and di every 3 minutes  FHT category 2    Having severe range pressures requiring IV treatment has gone labetalol 20, 40, 80 and receiving hydralazine            Smooth Bosch MD 11/15/2018 7:47 AM

## 2018-11-15 NOTE — ANESTHESIA PROCEDURE NOTES
Spinal Block    Patient location during procedure: OR  Start time: 11/15/2018 12:34 PM  Reason for block: procedure for pain, at surgeon's request, post-op pain management and primary anesthetic  Staffing  Anesthesiologist: Katerina Kaiser  Performed: anesthesiologist   Preanesthetic Checklist  Completed: patient identified, site marked, surgical consent, pre-op evaluation, timeout performed, IV checked, risks and benefits discussed and monitors and equipment checked  Spinal Block  Patient position: sitting  Prep: ChloraPrep  Patient monitoring: cardiac monitor and frequent blood pressure checks  Approach: midline  Location: L3-4  Injection technique: single-shot  Needle  Needle type: pencil-tip   Needle gauge: 24 G  Needle length: 10 cm  Assessment  Sensory level: T4  Injection Assessment:  negative aspiration for heme, no paresthesia on injection and positive aspiration for clear CSF    Post-procedure:  adhesive bandage applied, pressure dressing applied, secured with tape, site cleaned and sterile dressing applied

## 2018-11-15 NOTE — OB LABOR/OXYTOCIN SAFETY PROGRESS
Oxytocin Safety Progress Check Note - Barbi Sorto 29 y o  female MRN: [id number]    Unit/Bed#: -01 Encounter: [id number]    Obstetric History       T0      L0     SAB0   TAB0   Ectopic0   Multiple0   Live Births0      Gestational Age: 41w1d  Dose (osmany-units/min) Oxytocin: 4 osmany-units/min  Contraction Frequency (minutes): 2-4  Contraction Quality: Moderate  Tachysystole: No   Dilation: 6        Effacement (%): 90  Station: -1  Baseline Rate: 135 bpm  Fetal Heart Rate: 135 BPM  FHR Category: Category I          Notes/comments:     Feeling pressure, SVE as above  Will contact anesthesia  Cat I tracing  Continue pit titration      Matt Lopez MD 2018 10:41 PM

## 2018-11-15 NOTE — PLAN OF CARE
DISCHARGE PLANNING     Discharge to home or other facility with appropriate resources Progressing        INFECTION - ADULT     Absence or prevention of progression during hospitalization Progressing     Absence of fever/infection during neutropenic period Progressing        Knowledge Deficit     Patient/family/caregiver demonstrates understanding of disease process, treatment plan, medications, and discharge instructions Progressing        PAIN - ADULT     Verbalizes/displays adequate comfort level or baseline comfort level Progressing        SAFETY ADULT     Patient will remain free of falls Progressing     Maintain or return to baseline ADL function Progressing     Maintain or return mobility status to optimal level Progressing          BIRTH - VAGINAL/ SECTION     Fetal and maternal status remain reassuring during the birth process Completed     Emotionally satisfying birthing experience for mother/fetus Completed        Labor & Delivery     Manages discomfort Completed     Patient vital signs are stable Completed

## 2018-11-15 NOTE — OB LABOR/OXYTOCIN SAFETY PROGRESS
Oxytocin Safety Progress Check Note - Enrique Pop 29 y o  female MRN: [id number]    Unit/Bed#: -01 Encounter: [id number]    Obstetric History       T0      L0     SAB0   TAB0   Ectopic0   Multiple0   Live Births0      Gestational Age: 40w1d     Contraction Frequency (minutes): 2-4  Contraction Quality: Moderate  Tachysystole: No   Dilation: 5-6        Effacement (%): 80  Station: -1  Baseline Rate: 135 bpm  Fetal Heart Rate: 135 BPM  FHR Category: Category I          Notes/comments: Will start pitocin augmentation 2/2 protracted dilation  Pre-eclampsia labs WNL  Cat I tracing  Dr Genet Mullen aware      Sarah Rushing MD 2018 9:15 PM

## 2018-11-15 NOTE — OB LABOR/OXYTOCIN SAFETY PROGRESS
Oxytocin Safety Progress Check Note - Patience Lloyd 29 y o  female MRN: [id number]    Unit/Bed#: -01 Encounter: [id number]    Obstetric History       T0      L0     SAB0   TAB0   Ectopic0   Multiple0   Live Births0      Gestational Age: 38w3d  Dose (osmany-units/min) Oxytocin: 6 osmany-units/min  Contraction Frequency (minutes): 1 5-4  Contraction Quality: Moderate  Tachysystole: No   Dilation: 8        Effacement (%): 90  Station: 0  Baseline Rate: 140 bpm  Fetal Heart Rate: 135 BPM  FHR Category: Category I          Notes/comments:   Continues to make cervical change  Rex 2-3 minutes   Patient is resting comfortably   BP remains within acceptable limits most recent 113/64   Cat I tracing   Continue Oxytocin 6milli-units/min       Trice Caceres MD 11/15/2018 2:58 AM

## 2018-11-15 NOTE — OB LABOR/OXYTOCIN SAFETY PROGRESS
Oxytocin Safety Progress Check Note - Conrad Chen 29 y o  female MRN: [id number]    Unit/Bed#: -01 Encounter: [id number]    Obstetric History       T0      L0     SAB0   TAB0   Ectopic0   Multiple0   Live Births0      Gestational Age: 38w3d  Dose (osmany-units/min) Oxytocin: 10 osmany-units/min  Contraction Frequency (minutes): 3-4  Contraction Quality: Moderate  Tachysystole: No   Dilation: 9        Effacement (%): 90  Station: 0  Baseline Rate: 130 bpm  Fetal Heart Rate: 135 BPM  FHR Category: Category I          Notes/comments:   Feeling pain LLQ/pelvis  Will titrate Pitocin, reposition  Will ask anesthesia to evaluate pt            Richi Verduzco DO 11/15/2018 10:34 AM

## 2018-11-15 NOTE — DISCHARGE SUMMARY
Discharge Summary - Fabio Freeman 29 y o  female MRN: [id number]    Unit/Bed#: LD PACU-01 Encounter: [id number]    Admission Date: 2018     Discharge Date: 18    Delivering Attending: Adamaris Gill DO  Discharge Attending: Adamaris Gill DO    Admitting Diagnosis:   1  Pregnancy at 41w2d  2  Single IUP  3  Labor  4  Class III Obesity    Discharge Diagnosis:   Same, delivered    Procedures: Primary low transverse  section    Complications: none apparent    Hospital Course:     Fabio Freeman is a 29 y o   at 41w2d wks who was initially admitted for labor  Patient was started on Pitocin titration fragmentation and progressed to 9 cm  Patient was taken for primary  section secondary to maternal request     She delivered a viable female  (Dellar Card) on 11/15/18 at [phone]  Weight 9lbs 2oz via primary  section, low transverse incision  Apgars were 9 (1 min) and 9 (5 min)   was transferred to  nursery  Patient tolerated the procedure well and was transferred to recovery in stable condition  Patient's post- operative/ partum course was uncomplicated  Pre-operative Hg was 12 2 and 9 9 post-operatively  Condition at discharge: good     On day of discharge pain was well controlled, patient was tolerating PO with appropriate bowel function  She was discharged on postpartum day #2 with standard post-operative/ partum instructions to follow up with her physician in 3-6 weeks for a postpartum appointment and to call with any signs of infection or bleeding  Discharge instructions: See after visit summary for complete information  Do not place anything (no partner, tampons or douche) in your vagina for 6 weeks    You may walk for exercise for the first 6 weeks then gradually return to your usual activities     Please do not drive for 1 week if you have no stitches and for 2 weeks if you have stitches or underwent a  delivery     You may take baths or shower per your preference     Please look at your breasts in the mirror daily and call for redness or tenderness or increased warmth     If you have had a  please look at your incision daily as well and call us for increasing redness or steady drainage from the incision     Please call us for temperature > 100 4*F or 38* C, worsening pain or a foul discharge  Provisions for Follow-Up Care:  See after visit summary for information related to follow-up care and any pertinent home health orders  Disposition: See After Visit Summary for discharge disposition information  Planned Readmission: No    Discharge Medications:   Prenatal vitamin daily for 6 months or the duration of nursing whichever is longer    Percocet 1 tablet every 4 hours as needed for moderate- severe pain  Motrin 600 mg orally every 6 hours as needed for pain  Tylenol (over the counter) per bottle directions as needed for pain  Hydrocortisone cream 1% (over the counter) applied 1-2x daily to hemorrhoids as needed  Witch hazel pads for hemorrhoidal discomfort as needed         Len Baez MD  18

## 2018-11-15 NOTE — OB LABOR/OXYTOCIN SAFETY PROGRESS
Oxytocin Safety Progress Check Note - Marly Early 29 y o  female MRN: [id number]    Unit/Bed#: -01 Encounter: [id number]    Obstetric History       T0      L0     SAB0   TAB0   Ectopic0   Multiple0   Live Births0      Gestational Age: 38w3d  Dose (osmany-units/min) Oxytocin: 6 osmany-units/min  Contraction Frequency (minutes): 2-3  Contraction Quality: Moderate  Tachysystole: No   Dilation: 7        Effacement (%): 90  Station: 0  Baseline Rate: 140 bpm  Fetal Heart Rate: 135 BPM  FHR Category: Category I          Notes/comments:   Patient making cervical change  Fetal head well applied to cervix   Rex 2-3 minutes   Oxytocin increased from 4 to 6 osmany-units/min  Cat I tracing    Dr Marquez Batres and Dr Guadalupe Skiff aware          Negrita Kwon MD 11/15/2018 12:54 AM

## 2018-11-15 NOTE — OB LABOR/OXYTOCIN SAFETY PROGRESS
Oxytocin Safety Progress Check Note - Barbi Sorto 29 y o  female MRN: [id number]    Unit/Bed#: -01 Encounter: [id number]    Obstetric History       T0      L0     SAB0   TAB0   Ectopic0   Multiple0   Live Births0      Gestational Age: 38w3d  Dose (osmany-units/min) Oxytocin: 0 osmany-units/min (per Dr Kanchan Marsh)  Contraction Frequency (minutes): 3  Contraction Quality: Moderate  Tachysystole: No   Dilation: 9        Effacement (%): 90  Station: 0  Baseline Rate: 130 bpm  Fetal Heart Rate: 135 BPM  FHR Category: Category I          Notes/comments:   Patient now requesting to proceed to  for delivery  She does not feel that she can emotionally or physically proceed further in her labor  The patient and her  were allowed to have some time alone to discuss further what their wishes would be  I returned to the room and discussed the risks and benefits of  section  We included in increased risk of adverse outcomes for the mother when proceeding to  section including blood loss, infection, thrombosis, and injury to internal organs  The patient was able to ask questions and her  did as well  The patient and  both desire to proceed to  section after being informed of the risks and benefits  Written consent was obtained once again  Nursing staff and anesthesia were made aware  We will proceed shortly            Mallory Feliciano DO 11/15/2018 11:57 AM

## 2018-11-15 NOTE — DISCHARGE INSTRUCTIONS
Section   WHAT YOU SHOULD KNOW:   A  delivery, or , is abdominal surgery to deliver your baby  There are many reasons you may need a   · A  may be scheduled before labor if you had a  with your last baby  It may be scheduled if your baby is not positioned normally, or you are pregnant with more than 1 baby  · Your caregiver may perform an emergency  during labor to prevent life-threatening complications for you or your baby  A  may be done if your cervix does not dilate after several hours of active labor  · Other reasons for a  include maternal infections and problems with the placenta  AFTER YOU LEAVE:   Medicines:   · Prescription pain medicine  may be given  Ask how to take this medicine safely  · Acetaminophen  decreases pain and fever  It is available without a doctor's order  Ask how much to take and how often to take it  Follow directions  Acetaminophen can cause liver damage if not taken correctly  · NSAIDs  help decrease swelling and pain or fever  This medicine is available with or without a doctor's order  NSAIDs can cause stomach bleeding or kidney problems in certain people  If you take blood thinner medicine, always ask your obstetrician if NSAIDs are safe for you  Always read the medicine label and follow directions  · Take your medicine as directed  Contact your obstetrician (OB) if you think your medicine is not helping or if you have side effects  Tell him if you are allergic to any medicine  Keep a list of the medicines, vitamins, and herbs you take  Include the amounts, and when and why you take them  Bring the list or the pill bottles to follow-up visits  Carry your medicine list with you in case of an emergency  Follow up with your OB as directed: You may need to return to have your stitches or staples removed  Write down your questions so you remember to ask them during your visits    Wound care:  Carefully wash your wound with soap and water every day  Keep your wound clean and dry  Wear loose, comfortable clothes that do not rub against your wound  Ask your OB about bathing and showering  Drink plenty of liquids: You can lower your risk for a blood clot if you drink plenty of liquids  Ask how much liquid to drink each day and which liquids are best for you  Limit activity until you have fully recovered from surgery:   · Ask when it is safe for you to drive, walk up stairs, lift heavy objects, and have sex  · Ask when it is okay to exercise, and what types of exercise to do  Start slowly and do more as you get stronger  Contact your OB if:   · You have heavy vaginal bleeding that fills 1 or more sanitary pads in 1 hour  · You have a fever  · Your incision is swollen, red, or draining pus  · You have questions or concerns about yourself or your baby  Seek care immediately or call 911 if:   · Blood soaks through your bandage  · Your stitches come apart  · You feel lightheaded, short of breath, and have chest pain  · You cough up blood  · Your arm or leg feels warm, tender, and painful  It may look swollen and red  © 2014 [address] is for End User's use only and may not be sold, redistributed or otherwise used for commercial purposes  All illustrations and images included in CareNotes® are the copyrighted property of A D A M [employer] Inc  or Jordan Rodriguez  The above information is an  only  It is not intended as medical advice for individual conditions or treatments  Talk to your doctor, nurse or pharmacist before following any medical regimen to see if it is safe and effective for you

## 2018-11-16 LAB
BASOPHILS # BLD AUTO: 0.02 THOUSANDS/ΜL (ref 0–0.1)
BASOPHILS NFR BLD AUTO: 0 % (ref 0–1)
EOSINOPHIL # BLD AUTO: 0.04 THOUSAND/ΜL (ref 0–0.61)
EOSINOPHIL NFR BLD AUTO: 0 % (ref 0–6)
ERYTHROCYTE [DISTWIDTH] IN BLOOD BY AUTOMATED COUNT: 14.2 % (ref 11.6–15.1)
HCT VFR BLD AUTO: 31.2 % (ref 34.8–46.1)
HGB BLD-MCNC: 9.9 G/DL (ref 11.5–15.4)
IMM GRANULOCYTES # BLD AUTO: 0.09 THOUSAND/UL (ref 0–0.2)
IMM GRANULOCYTES NFR BLD AUTO: 1 % (ref 0–2)
LYMPHOCYTES # BLD AUTO: 2.33 THOUSANDS/ΜL (ref 0.6–4.47)
LYMPHOCYTES NFR BLD AUTO: 16 % (ref 14–44)
MCH RBC QN AUTO: 27.2 PG (ref 26.8–34.3)
MCHC RBC AUTO-ENTMCNC: 31.7 G/DL (ref 31.4–37.4)
MCV RBC AUTO: 86 FL (ref 82–98)
MONOCYTES # BLD AUTO: 0.96 THOUSAND/ΜL (ref 0.17–1.22)
MONOCYTES NFR BLD AUTO: 7 % (ref 4–12)
NEUTROPHILS # BLD AUTO: 11.36 THOUSANDS/ΜL (ref 1.85–7.62)
NEUTS SEG NFR BLD AUTO: 76 % (ref 43–75)
NRBC BLD AUTO-RTO: 0 /100 WBCS
PLATELET # BLD AUTO: 208 THOUSANDS/UL (ref 149–390)
PMV BLD AUTO: 10.5 FL (ref 8.9–12.7)
RBC # BLD AUTO: 3.64 MILLION/UL (ref 3.81–5.12)
WBC # BLD AUTO: 14.8 THOUSAND/UL (ref 4.31–10.16)

## 2018-11-16 PROCEDURE — 85025 COMPLETE CBC W/AUTO DIFF WBC: CPT | Performed by: OBSTETRICS & GYNECOLOGY

## 2018-11-16 RX ADMIN — ACETAMINOPHEN 650 MG: 325 TABLET, FILM COATED ORAL at 07:46

## 2018-11-16 RX ADMIN — DOCUSATE SODIUM 100 MG: 100 CAPSULE, LIQUID FILLED ORAL at 17:41

## 2018-11-16 RX ADMIN — KETOROLAC TROMETHAMINE 30 MG: 30 INJECTION, SOLUTION INTRAMUSCULAR at 01:20

## 2018-11-16 RX ADMIN — ACETAMINOPHEN 650 MG: 325 TABLET, FILM COATED ORAL at 16:47

## 2018-11-16 RX ADMIN — KETOROLAC TROMETHAMINE 30 MG: 30 INJECTION, SOLUTION INTRAMUSCULAR at 06:43

## 2018-11-16 RX ADMIN — IBUPROFEN 600 MG: 600 TABLET ORAL at 14:33

## 2018-11-16 RX ADMIN — DOCUSATE SODIUM 100 MG: 100 CAPSULE, LIQUID FILLED ORAL at 07:46

## 2018-11-16 RX ADMIN — ENOXAPARIN SODIUM 40 MG: 40 INJECTION SUBCUTANEOUS at 06:14

## 2018-11-16 RX ADMIN — OXYCODONE HYDROCHLORIDE AND ACETAMINOPHEN 1 TABLET: 5; 325 TABLET ORAL at 22:07

## 2018-11-16 NOTE — PROGRESS NOTES
Progress Note - OB/GYN  Conrad Chen 29 y o  female MRN: [id number]  Unit/Bed#: -01 Encounter: [id number]      A/P: POD # 1 s/p 1LTCS   1) Pre-E   - BP: 1teens-130s/50-90   - Asymptomatic at this time  2) Labs   - Hgb: 12 2 --> 9 9   - UOP: 237cc/hr --> gant out today and voiding trial to follow  3) Continue routine post-op care   - Encourage ambulation   - Encourage breastfeeding   - Anticipate discharge on 11/18/18         ______________      Subjective:  Patient feeling well this morning  No complaints at this time  Pain: well controlled  Tolerating PO: yes  Voiding: voiding trial today  Flatus: yes  BM: no  Ambulating: no  Breastfeeding:  yes  Chest pain: no  Shortness of breath: no  Leg pain: no  Lochia: minimal    Vitals:   /52   Pulse 76   Temp 97 5 °F (36 4 °C) (Oral)   Resp 18   Ht 5' 3" (1 6 m)   Wt 113 kg (249 lb)   LMP 01/22/2018 (Exact Date)   SpO2 99%   Breastfeeding?  Yes Comment: and bottle feeding   BMI 44 11 kg/m²       Intake/Output Summary (Last 24 hours) at 11/16/18 0743  Last data filed at 11/16/18 0630   Gross per 24 hour   Intake             2420 ml   Output             3375 ml   Net             -955 ml       Invasive Devices     Peripheral Intravenous Line            Peripheral IV 11/14/18 Left Wrist 1 day                Physical Exam:   GEN: Conrad Chen appears well, alert and oriented x 3, pleasant and cooperative   ABDOMEN: soft, no tenderness, no distention, fundus @ -2 below the umbilicus, Incision C/D/I  EXTREMITIES: SCDs on      Labs:   Admission on 11/14/2018   Component Date Value    WBC 11/14/2018 12 22*    RBC 11/14/2018 4 78     Hemoglobin 11/14/2018 12 9     Hematocrit 11/14/2018 39 7     MCV 11/14/2018 83     MCH 11/14/2018 27 0     MCHC 11/14/2018 32 5     RDW 11/14/2018 13 9     MPV 11/14/2018 10 3     Platelets 64/40/6963 283     nRBC 11/14/2018 0     Neutrophils Relative 11/14/2018 82*    Immat GRANS % 11/14/2018 0     Lymphocytes Relative 11/14/2018 13*    Monocytes Relative 11/14/2018 5     Eosinophils Relative 11/14/2018 0     Basophils Relative 11/14/2018 0     Neutrophils Absolute 11/14/2018 9 95*    Immature Grans Absolute 11/14/2018 0 04     Lymphocytes Absolute 11/14/2018 1 55     Monocytes Absolute 11/14/2018 0 65     Eosinophils Absolute 11/14/2018 0 01     Basophils Absolute 11/14/2018 0 02     RPR 11/14/2018 Non-Reactive     ABO Grouping 11/14/2018 O     Rh Factor 11/14/2018 Positive     Antibody Screen 11/14/2018 Negative     Specimen Expiration Date 11/14/2018 [id number]     Sodium 11/14/2018 135*    Potassium 11/14/2018 3 8     Chloride 11/14/2018 106     CO2 11/14/2018 20*    ANION GAP 11/14/2018 9     BUN 11/14/2018 7     Creatinine 11/14/2018 0 54*    Glucose 11/14/2018 31*    Calcium 11/14/2018 8 6     AST 11/14/2018 14     ALT 11/14/2018 19     Alkaline Phosphatase 11/14/2018 182*    Total Protein 11/14/2018 7 0     Albumin 11/14/2018 2 6*    Total Bilirubin 11/14/2018 0 45     eGFR 11/14/2018 129     Creatinine, Ur 11/14/2018 69 9     Protein Urine Random 11/14/2018 10     Prot/Creat Ratio, Ur 11/14/2018 0 14*    Sodium 11/14/2018 135*    Potassium 11/14/2018 3 8     Chloride 11/14/2018 105     CO2 11/14/2018 23     ANION GAP 11/14/2018 7     BUN 11/14/2018 7     Creatinine 11/14/2018 0 67     Glucose 11/14/2018 72     Calcium 11/14/2018 8 5     AST 11/14/2018 15     ALT 11/14/2018 18     Alkaline Phosphatase 11/14/2018 160*    Total Protein 11/14/2018 6 4     Albumin 11/14/2018 2 3*    Total Bilirubin 11/14/2018 0 48     eGFR 11/14/2018 120     Sodium 11/15/2018 132*    Potassium 11/15/2018 3 6     Chloride 11/15/2018 104     CO2 11/15/2018 20*    ANION GAP 11/15/2018 8     BUN 11/15/2018 8     Creatinine 11/15/2018 0 75     Glucose 11/15/2018 124     Calcium 11/15/2018 8 1*    AST 11/15/2018 18     ALT 11/15/2018 18     Alkaline Phosphatase 11/15/2018 173*    Total Protein 11/15/2018 6 4     Albumin 11/15/2018 2 3*    Total Bilirubin 11/15/2018 1 22*    eGFR 11/15/2018 109     WBC 11/15/2018 18 88*    RBC 11/15/2018 4 49     Hemoglobin 11/15/2018 12 2     Hematocrit 11/15/2018 37 4     MCV 11/15/2018 83     MCH 11/15/2018 27 2     MCHC 11/15/2018 32 6     RDW 11/15/2018 14 3     Platelets 68/80/7233 260     MPV 11/15/2018 10 8     Creatinine, Ur 11/15/2018 79 0     Protein Urine Random 11/15/2018 30     Prot/Creat Ratio, Ur 11/15/2018 0 38*    pH, Cord Pito 11/15/2018 7  352     pCO2, Cord Pito 11/15/2018 40 4     pO2, Cord Pito 11/15/2018 25 9     HCO3, Cord Pito 11/15/2018 21 9     Base Exc, Cord Pito 11/15/2018 -3 4*    O2 Cont, Cord Pito 11/15/2018 13 6     O2 HGB,VENOUS CORD 11/15/2018 60 2     pH, Cord Art 11/15/2018 7 276     pCO2, Cord Art 11/15/2018 55 2     pO2, Cord Art 11/15/2018 12 2     HCO3, Cord Art 11/15/2018 25 1     Base Exc, Cord Art 11/15/2018 -2 7*    O2 Content, Cord Art 11/15/2018 4 3     O2 Hgb, Arterial Cord 11/15/2018 19 1     WBC 11/16/2018 14 80*    RBC 11/16/2018 3 64*    Hemoglobin 11/16/2018 9 9*    Hematocrit 11/16/2018 31 2*    MCV 11/16/2018 86     MCH 11/16/2018 27 2     MCHC 11/16/2018 31 7     RDW 11/16/2018 14 2     MPV 11/16/2018 10 5     Platelets 65/66/7513 208     nRBC 11/16/2018 0     Neutrophils Relative 11/16/2018 76*    Immat GRANS % 11/16/2018 1     Lymphocytes Relative 11/16/2018 16     Monocytes Relative 11/16/2018 7     Eosinophils Relative 11/16/2018 0     Basophils Relative 11/16/2018 0     Neutrophils Absolute 11/16/2018 11 36*    Immature Grans Absolute 11/16/2018 0 09     Lymphocytes Absolute 11/16/2018 2 33     Monocytes Absolute 11/16/2018 0 96     Eosinophils Absolute 11/16/2018 0 04     Basophils Absolute 11/16/2018 0 02          Patient Active Problem List   Diagnosis    Encounter for supervision of normal first pregnancy in third trimester    Maternal morbid obesity in third trimester, antepartum ([address] )    41 weeks gestation of pregnancy    BMI 40 0-44 9, adult [facility])     delivery delivered            Stephenie Beckwith MD  2018  7:43 AM

## 2018-11-16 NOTE — PLAN OF CARE
ALTERATION IN THE BREAST     Optimize infant feeding at the breast Progressing        DISCHARGE PLANNING     Discharge to home or other facility with appropriate resources Progressing        INADEQUATE LATCH, SUCK OR SWALLOW     Demonstrate ability to latch and sustain latch, audible swallowing and satiety Progressing        INFECTION - ADULT     Absence or prevention of progression during hospitalization Progressing     Absence of fever/infection during neutropenic period Progressing        Knowledge Deficit     Patient/family/caregiver demonstrates understanding of disease process, treatment plan, medications, and discharge instructions Progressing        PAIN - ADULT     Verbalizes/displays adequate comfort level or baseline comfort level Progressing        POSTPARTUM     Experiences normal postpartum course Progressing     Appropriate maternal -  bonding Progressing     Establishment of infant feeding pattern Progressing     Incision(s), wounds(s) or drain site(s) healing without S/S of infection Progressing        Prexisting or High Potential for Compromised Skin Integrity     Skin integrity is maintained or improved Progressing        SAFETY ADULT     Patient will remain free of falls Progressing     Maintain or return to baseline ADL function Progressing     Maintain or return mobility status to optimal level Progressing

## 2018-11-17 VITALS
RESPIRATION RATE: 18 BRPM | DIASTOLIC BLOOD PRESSURE: 82 MMHG | BODY MASS INDEX: 44.12 KG/M2 | TEMPERATURE: 97.5 F | SYSTOLIC BLOOD PRESSURE: 126 MMHG | HEART RATE: 71 BPM | HEIGHT: 63 IN | OXYGEN SATURATION: 97 % | WEIGHT: 249 LBS

## 2018-11-17 PROCEDURE — 99024 POSTOP FOLLOW-UP VISIT: CPT | Performed by: OBSTETRICS & GYNECOLOGY

## 2018-11-17 RX ORDER — DOCUSATE SODIUM 100 MG/1
100 CAPSULE, LIQUID FILLED ORAL 2 TIMES DAILY
Qty: 10 CAPSULE | Refills: 0
Start: 2018-11-17 | End: 2021-03-18

## 2018-11-17 RX ORDER — IBUPROFEN 600 MG/1
600 TABLET ORAL EVERY 6 HOURS PRN
Qty: 30 TABLET | Refills: 0
Start: 2018-11-17 | End: 2021-03-18

## 2018-11-17 RX ORDER — ACETAMINOPHEN 325 MG/1
650 TABLET ORAL EVERY 4 HOURS PRN
Qty: 30 TABLET | Refills: 0
Start: 2018-11-17

## 2018-11-17 RX ORDER — OXYCODONE HYDROCHLORIDE AND ACETAMINOPHEN 5; 325 MG/1; MG/1
1 TABLET ORAL EVERY 4 HOURS PRN
Qty: 7 TABLET | Refills: 0 | Status: SHIPPED | OUTPATIENT
Start: 2018-11-17 | End: 2018-11-27

## 2018-11-17 RX ADMIN — ENOXAPARIN SODIUM 40 MG: 40 INJECTION SUBCUTANEOUS at 08:35

## 2018-11-17 RX ADMIN — ACETAMINOPHEN 650 MG: 325 TABLET, FILM COATED ORAL at 11:12

## 2018-11-17 RX ADMIN — DOCUSATE SODIUM 100 MG: 100 CAPSULE, LIQUID FILLED ORAL at 08:35

## 2018-11-17 RX ADMIN — IBUPROFEN 600 MG: 600 TABLET ORAL at 09:42

## 2018-11-17 RX ADMIN — IBUPROFEN 600 MG: 600 TABLET ORAL at 01:45

## 2018-11-17 RX ADMIN — ACETAMINOPHEN 650 MG: 325 TABLET, FILM COATED ORAL at 05:16

## 2018-11-17 NOTE — PROGRESS NOTES
Progress Note - OB/GYN   Rosy Ramus 29 y o  female MRN: [id number]  Unit/Bed#: -83 Encounter: [id number]    Assessment:  29 y o  Tawnya Pool s/p Primary low transverse  section post-operative day 2  Patient recovering well, Stable  Desires discharge today, 18    POD #2  Continue routine post partum care  Pain management PRN  Encourage ambulation  Encourage incentive spirometry  Encourage breastfeeding    Preeclampsia, did not receive magnesium during labor course  -130/60-80s overnight  Patient counseled on diagnosis and that she should start ASA in next pregnancy    Subjective/Objective   Chief Complaint:    Postpartum state    Subjective:   Pain: yes, incisional, improved with meds  Tolerating PO: yes  Voiding: yes  Flatus: yes  BM: no  Ambulating: yes  Breastfeeding:  yes  Chest pain: no  Shortness of breath: no  Leg pain: no  Lochia: minimal    Objective:     Vitals: Temp:  [98 1 °F (36 7 °C)-98 3 °F (36 8 °C)] 98 1 °F (36 7 °C)  HR:  [] 61  Resp:  [18-20] 20  BP: (119-133)/(62-83) 120/62       Intake/Output Summary (Last 24 hours) at 18 0819  Last data filed at 18 1442   Gross per 24 hour   Intake                0 ml   Output              800 ml   Net             -800 ml     Physical Exam:   General: NAD, alert, oriented  Cardio: Regular rate and rhythm, no murmur  Resp: nonlabored breathing, clear to auscultation bilaterally  Abdomen: Soft, no distension/rebound/guarding/tenderness   Fundus: Firm, non-tender, fundus: at umbilicus  Incision:Closed with staples C/D/I  G/U: minimal  lochia noted on pad  Lower Extremities: Non-tender, no palpable cords    Medications:  Current Facility-Administered Medications   Medication Dose Route Frequency    acetaminophen (TYLENOL) tablet 650 mg  650 mg Oral Q6H    acetaminophen (TYLENOL) tablet 650 mg  650 mg Oral Q6H PRN    benzocaine-menthol-lanolin-aloe (DERMOPLAST) 20-0 5 % topical spray 1 application  1 application Topical Q6H PRN    calcium carbonate (TUMS) chewable tablet 1,000 mg  1,000 mg Oral Daily PRN    diphenhydrAMINE (BENADRYL) tablet 25 mg  25 mg Oral Q6H PRN    docusate sodium (COLACE) capsule 100 mg  100 mg Oral BID    enoxaparin (LOVENOX) subcutaneous injection 40 mg  40 mg Subcutaneous Q24H [address]    hydrocortisone 1 % cream 1 application  1 application Topical Daily PRN    HYDROmorphone (DILAUDID) injection 0 5 mg  0 5 mg Intravenous Q1H PRN    HYDROmorphone (DILAUDID) injection 1 mg  1 mg Intravenous Q2H PRN    ibuprofen (MOTRIN) tablet 600 mg  600 mg Oral Q6H PRN    metoclopramide (REGLAN) injection 10 mg  10 mg Intravenous Once PRN    ondansetron (ZOFRAN) injection 4 mg  4 mg Intravenous Once PRN    ondansetron (ZOFRAN) injection 4 mg  4 mg Intravenous Q8H PRN    oxyCODONE-acetaminophen (PERCOCET) 5-325 mg per tablet 1 tablet  1 tablet Oral Q4H PRN    oxyCODONE-acetaminophen (PERCOCET) 5-325 mg per tablet 2 tablet  2 tablet Oral Q4H PRN    promethazine (PHENERGAN) injection 12 5 mg  12 5 mg Intravenous Once PRN    simethicone (MYLICON) chewable tablet 80 mg  80 mg Oral 4x Daily PRN    witch hazel-glycerin (TUCKS) topical pad 1 pad  1 pad Topical Q4H PRN       Pita Lax  11/17/2018  8:19 AM

## 2018-11-23 ENCOUNTER — POSTPARTUM VISIT (OUTPATIENT)
Dept: OBGYN CLINIC | Facility: MEDICAL CENTER | Age: 28
End: 2018-11-23

## 2018-11-23 VITALS — DIASTOLIC BLOOD PRESSURE: 80 MMHG | SYSTOLIC BLOOD PRESSURE: 140 MMHG | BODY MASS INDEX: 41.06 KG/M2 | WEIGHT: 231.8 LBS

## 2018-11-23 DIAGNOSIS — Z98.891 S/P CESAREAN SECTION: Primary | ICD-10-CM

## 2018-11-23 PROCEDURE — 99024 POSTOP FOLLOW-UP VISIT: CPT | Performed by: OBSTETRICS & GYNECOLOGY

## 2018-11-23 NOTE — PROGRESS NOTES
Assessment/Plan:      Diagnoses and all orders for this visit:    S/P  section        Staples were removed and the wound was cleansed with hydrogen peroxide  Benzoin and Steri-Strips were placed  Patient may begin driving  She was advised against heavy lifting or straining for full 6 weeks  Patient to return on  for her final postpartum check  Subjective:     Patient ID: Fabio Freeman is a 29 y o  female  Patient returns 8 days status post primary  section  She has done well  She is taking only Motrin for pain  She is bottle feeding  Lochia is slowing  She has no bowel or bladder complaints  The baby is doing well  Suture / Staple Removal         Review of Systems   All other systems reviewed and are negative  Objective:     Physical Exam   Abdominal:   Incision is healthy with staples in place  There is no abnormal discharge or bleeding  There is minimal erythema at some of the staple line

## 2018-12-07 ENCOUNTER — POSTPARTUM VISIT (OUTPATIENT)
Dept: OBGYN CLINIC | Facility: MEDICAL CENTER | Age: 28
End: 2018-12-07

## 2018-12-07 VITALS — DIASTOLIC BLOOD PRESSURE: 84 MMHG | WEIGHT: 226.6 LBS | SYSTOLIC BLOOD PRESSURE: 132 MMHG | BODY MASS INDEX: 40.14 KG/M2

## 2018-12-07 PROCEDURE — 99024 POSTOP FOLLOW-UP VISIT: CPT | Performed by: OBSTETRICS & GYNECOLOGY

## 2018-12-07 RX ORDER — NORETHINDRONE ACETATE AND ETHINYL ESTRADIOL AND FERROUS FUMARATE 1MG-20(24)
1 KIT ORAL DAILY
Qty: 28 TABLET | Refills: 6 | Status: SHIPPED | OUTPATIENT
Start: 2018-12-07 | End: 2021-03-18

## 2018-12-07 NOTE — PROGRESS NOTES
Assessment/Plan:      Diagnoses and all orders for this visit:    Postpartum care and examination  -     norethindrone-ethinyl estradiol-ferrous fumarate (LOESTIN 24 FE) 1-20 MG-MCG(24) per tablet; Take 1 tablet by mouth daily        Postpartum examination was completed  Patient was prescribed an OCP  She will await the onset of menses and will use condoms during the 1st month  Patient cautioned against heavy lifting or straining for the 1st 6 weeks postoperatively and then may slowly resume normal activit  She will return to the office in 6 months for her annual visit  Subjective:     Patient ID: Shira Gross is a 29 y o  female  Patient returns status post primary  section for a 9 lb 2 oz baby and arrest disorder  She has progressed quite well  Roger Pretty is now gone  She is not breastfeeding  She is doing well emotionally  She has no pain  She has desire to start OCP  Her daughter is thriving  Review of Systems   All other systems reviewed and are negative  Objective:     Physical Exam   Constitutional: She appears well-developed and well-nourished  Pulmonary/Chest: Effort normal    Abdominal: Soft  Incision well healed and well approximated and nontender     Genitourinary:   Genitourinary Comments: External genitalia demonstrate normal female without lesions  Vagina healthy without lesions or discharge  Cervix healthy without lesions or discharge  Uterus normal size nontender  Adnexa nontender without obvious mass

## 2021-03-18 ENCOUNTER — ULTRASOUND (OUTPATIENT)
Dept: OBGYN CLINIC | Facility: CLINIC | Age: 31
End: 2021-03-18
Payer: COMMERCIAL

## 2021-03-18 VITALS — BODY MASS INDEX: 44.18 KG/M2 | DIASTOLIC BLOOD PRESSURE: 70 MMHG | SYSTOLIC BLOOD PRESSURE: 116 MMHG | WEIGHT: 249.4 LBS

## 2021-03-18 DIAGNOSIS — N91.1 SECONDARY AMENORRHEA: Primary | ICD-10-CM

## 2021-03-18 PROBLEM — Z34.03 ENCOUNTER FOR SUPERVISION OF NORMAL FIRST PREGNANCY IN THIRD TRIMESTER: Status: RESOLVED | Noted: 2018-05-22 | Resolved: 2021-03-18

## 2021-03-18 PROBLEM — Z3A.41 41 WEEKS GESTATION OF PREGNANCY: Status: RESOLVED | Noted: 2018-09-11 | Resolved: 2021-03-18

## 2021-03-18 PROBLEM — E66.01 MATERNAL MORBID OBESITY IN THIRD TRIMESTER, ANTEPARTUM (HCC): Status: RESOLVED | Noted: 2018-05-22 | Resolved: 2021-03-18

## 2021-03-18 PROBLEM — O48.0 41 WEEKS GESTATION OF PREGNANCY: Status: RESOLVED | Noted: 2018-09-11 | Resolved: 2021-03-18

## 2021-03-18 PROBLEM — O99.213 MATERNAL MORBID OBESITY IN THIRD TRIMESTER, ANTEPARTUM (HCC): Status: RESOLVED | Noted: 2018-05-22 | Resolved: 2021-03-18

## 2021-03-18 PROCEDURE — 99213 OFFICE O/P EST LOW 20 MIN: CPT | Performed by: NURSE PRACTITIONER

## 2021-03-18 PROCEDURE — 76817 TRANSVAGINAL US OBSTETRIC: CPT | Performed by: NURSE PRACTITIONER

## 2021-03-18 NOTE — PROGRESS NOTES
Assessment/Plan:    Secondary amenorrhea    Tati Buchanan  is a 32 y o  Lemons Aschoff who presents for early ultrasound  Single IUP @ 8w6d consistent with LMP of 1/12/21 and MAGGY of 10/19/21 (9w2d by dates)  Planned pregnancy  Having nausea and breast tenderness  Will schedule OB intake and prenatal one  Encouraged to continue PNV  Discussed avoiding teratogens  S/P Flu vaccine  Written information given on COVID vaccine  To notify us with any bleeding or pelvic pain  Verbalized understanding  Diagnoses and all orders for this visit:    Secondary amenorrhea        Subjective:      Patient ID: Lianna Cole is a 32 y o  female  [facility] EARLY PREGNANCY ULTRASOUND     Ultrasound Probe Disinfection     A transvaginal ultrasound was performed  Prior to use, disinfection was performed with High Level Disinfection Process ([employer])  Probe serial number SLOGA-B: [id number] was used     Deacurt CARRASQUILLO  3/18/21        SUBJECTIVE     HPI: Lianna Cole is a 32 y o  female here today for early pregnancy ultrasound  Patient's last menstrual period was 1/12/21 (exact date)    Menses are regular  She is accompanied by her   OB history is significant for:   # 1 female/term/C/S for failure to progress/no complications   # 2 current   Medical history is significant for: obesity   Family Hx:  None    Taking a prenatal vitamin  No Known Allergies        OBJECTIVE  /70 (BP Location: Left arm, Patient Position: Sitting, Cuff Size: Standard)   Wt 113 kg (249 lb 6 4 oz)   LMP 01/12/2021   BMI 44 18 kg/m²        Early OB Ultrasound Procedure Note: Transvaginal US     Technician: Study performed by the interpreting FOREIGN     Indications:  Early gestation, dating & viability     Procedure Details   The entire study was done at settings of 6 0 to 8 0 MHz  Gestational Sac: Present  Yolk sac: Present  Crown-rump length is 2 18cm and calculates to an estimated gestational age of 6 weeks,6  days  Embryonic cardiac activity is seen at a rate of 179 b/min    Description of fetal anatomy Normal     Cul-de-sac: no fluid  Left ovary: not appreciated  Right ovary: not appreciated     Findings:  Viable, laughlin intrauterine pregnancy        ASSESSMENT  Early pregnancy at 9 weeks 2 days with a calculated MAGGY of 10/19/21 based on LMP  consistent with today's ultrasound

## 2021-03-18 NOTE — PATIENT INSTRUCTIONS
[facility]:   What changes are happening in my body? Pregnancy hormones may cause your body to go through many changes during this stage of your pregnancy  You may have any of the following:  · Fatigue    · Tender, swollen breasts    · Nausea or vomiting (morning sickness)    · Mood swings    · Frequent urination     · Headache    · Heartburn or constipation    · Weight gain or loss    · Cravings for certain foods or dislike of foods you normally eat    How do I care for myself at this stage of my pregnancy? · Manage nausea and vomiting  Avoid fatty and spicy foods  Eat small meals throughout the day instead of large meals  [place] may help to decrease nausea  Ask your healthcare provider about other ways of decreasing nausea and vomiting  · Eat a variety of healthy foods  Healthy foods include fruits, vegetables, whole-grain breads, low-fat dairy foods, beans, lean meats, and fish  Drink liquids as directed  Ask how much liquid to drink each day and which liquids are best for you  Limit caffeine to less than 200 milligrams each day  Limit your intake of fish to 2 servings each week  Choose fish low in mercury such as canned light tuna, shrimp, salmon, cod, or tilapia  Do not  eat fish high in mercury such as swordfish, tilefish, sisi mackerel, and shark  · Take prenatal vitamins as directed  Your need for certain vitamins and minerals, such as folic acid, increases during pregnancy  Prenatal vitamins provide some of the extra vitamins and minerals you need  Prenatal vitamins may also help to decrease the risk of certain birth defects  · Ask how much weight you should gain each month  Too much or too little weight gain can be unhealthy for you and your baby  · Do not smoke  Smoking increases your risk of a miscarriage and other health problems during your pregnancy  Smoking can cause your baby to be born too early or weigh less at birth   Quit smoking as soon as you think you might be pregnant  Ask your healthcare provider for information if you need help quitting  · Do not drink alcohol  Alcohol passes from your body to your baby through the placenta  It can affect your baby's brain development and cause fetal alcohol syndrome (FAS)  FAS is a group of conditions that causes mental, behavior, and growth problems  · Talk to your healthcare provider before you take any medicines  Many medicines may harm your baby if you take them when you are pregnant  Do not take any medicines, vitamins, herbs, or supplements without first talking to your healthcare provider  Never use illegal or street drugs (such as marijuana or cocaine) while you are pregnant  What are some safety tips during pregnancy? · Avoid hot tubs and saunas  Do not use a hot tub or sauna while you are pregnant, especially during your first trimester  Hot tubs and saunas may raise your baby's temperature and increase the risk of birth defects  · Avoid toxoplasmosis  This is an infection caused by eating raw meat or being around infected cat feces  It can cause birth defects, miscarriages, and other problems  Wash your hands after you touch raw meat  Make sure any meat is well-cooked before you eat it  Avoid raw eggs and unpasteurized milk  Use gloves or ask someone else to clean your cat's litter box while you are pregnant  What changes are happening with my baby? By 10 weeks, your baby will be about 2½ inches long from the top of the head to the rump (baby's bottom)  Your baby weighs about ½ ounce  Major body organs, such as the brain, heart, and lungs, are forming  Your baby's facial features are also starting to form  What do I need to know about prenatal care? Prenatal care is a series of visits with your healthcare provider throughout your pregnancy  During the first 28 weeks of your pregnancy, you will see your healthcare provider 1 time each month   Prenatal care can help prevent problems during pregnancy and childbirth  Your healthcare provider will check your blood pressure and weight  Your baby's heart rate will also be checked  You may also need the following at some visits:  · A pelvic exam  allows your healthcare provider to see your cervix (the bottom part of your uterus)  Your healthcare provider will use a speculum to open your vagina  He or she will check the size and shape of your uterus  You may also have a Pap smear at your first prenatal visit  This is a test to check your cervix for abnormal cells  · Blood tests  may be done to check for any of the following:     ? Gestational diabetes or anemia (low iron level)    ? Blood type or Rh factor, or certain birth defects    ? Immunity to certain diseases, such as chickenpox or rubella    ? An infection, such as a sexually transmitted infection, HIV, or hepatitis B    · Hepatitis B  may need to be prevented or treated  Hepatitis B is inflammation of the liver caused by the hepatitis B virus (HBV)  HBV can spread from a mother to her baby during delivery  You will be checked for HBV as early as possible in the first trimester of each pregnancy  You need the test even if you received the hepatitis B vaccine or were tested before  You may need to have an HBV infection treated before you give birth  · Urine tests  may also be done to check for sugar and protein  These can be signs of gestational diabetes or preeclampsia  Urine tests may also be done to check for signs of infection  · A fetal ultrasound  shows pictures of your baby inside your uterus  The pictures are used to check your baby's development, movement, and position  · Genetic disorder screening tests  may be offered to you  These screening tests check your baby's risk for genetic disorders such as Down syndrome  A screening test includes a blood test and ultrasound  When should I seek immediate care?    · You have pain or cramping in your abdomen or low back  · You have heavy vaginal bleeding or clotting  · You pass material that looks like tissue or large clots  Collect the material and bring it with you  When should I call my doctor or obstetrician? · You have light bleeding  · You have chills or a fever  · You have vaginal itching, burning, or pain  · You have yellow, green, white, or foul-smelling vaginal discharge  · You have pain or burning when you urinate, less urine than usual, or pink or bloody urine  · You have questions or concerns about your condition or care  CARE AGREEMENT:   You have the right to help plan your care  Learn about your health condition and how it may be treated  Discuss treatment options with your healthcare providers to decide what care you want to receive  You always have the right to refuse treatment  The above information is an  only  It is not intended as medical advice for individual conditions or treatments  Talk to your doctor, nurse or pharmacist before following any medical regimen to see if it is safe and effective for you  © Copyright [address] Information is for End User's use only and may not be sold, redistributed or otherwise used for commercial purposes   All illustrations and images included in CareNotes® are the copyrighted property of [employer]  or [address] [place] [address]

## 2021-03-18 NOTE — PROGRESS NOTES
Patient here for early U/S  LMP 2021  Periods are irregular  Pregnancy planned    She states she has nausea and breast tenderness  She denies any cramping or spotting  She has had the flu vaccine  She is accompanied by her  Arnulfo [place]

## 2021-03-24 ENCOUNTER — TELEMEDICINE (OUTPATIENT)
Dept: OBGYN CLINIC | Facility: CLINIC | Age: 31
End: 2021-03-24

## 2021-03-24 VITALS — WEIGHT: 249 LBS | BODY MASS INDEX: 44.11 KG/M2

## 2021-03-24 DIAGNOSIS — Z83.3 FAMILY HISTORY OF DIABETES MELLITUS IN FIRST DEGREE RELATIVE: ICD-10-CM

## 2021-03-24 DIAGNOSIS — Z34.81 PRENATAL CARE, SUBSEQUENT PREGNANCY, FIRST TRIMESTER: Primary | ICD-10-CM

## 2021-03-24 PROCEDURE — OBC: Performed by: NURSE PRACTITIONER

## 2021-03-24 NOTE — PROGRESS NOTES
OB INTAKE INTERVIEW  Patient is a 32 o  year old who presents for OB intake at 10 wks  Pregnancy planned and welcomed  Patient lives at home with her  and daughter, Dorota Fernández, age 3  Patient is a teacher at [employer]  She is accompanied by: Telephone  The father of her baby Elsy Kang) is involved in the pregnancy and is 32years old    Ob hx:  1 female/term/C/S for failure to progress/no complications   S1G4  Last Menstrual Period:   Ultrasound: Measured 9 weeks 2 days on 21  Estimated Date of Delivery: 10/19/21 by US 9w2d    Signs/Symptoms of Pregnancy  Current pregnancy symptoms: nausea without emesis, resolving  Constipation no  Headaches no  Cramping/spotting no  PICA cravings no    Diabetes-    If patient has 1 or more, please order early 1 hour GTT  History of GDM no  BMI >35 YES  History of PCOS or current metformin use no  History of LGA/macrosomic infant (4000g/9lbs) YES    If patient has 2 or more, please order early 1 hour GTT  BMI>30 YES  AMA no  First degree relative with type 2 diabetes YES  History of chronic HTN, hyperlipidemia, elevated A1C no  High risk race (, , ,  or ) no    Hypertension- if you answer yes, please order preeclampsia labs (comprehensive metabolic panel, urine protein creatinine ratio, 24 hour urine)  History of of chronic HTN no  History of gestational HTN no  History of preeclampsia, eclampsia, or HELLP syndrome no  History of diabetes no  History of lupus, autoimmune disease, kidney disease no    Thyroid- if yes order TSH with reflex T4  History of thyroid disease no    OB/GYN-  History of abnormal pap smear no  History of HPV no  History of Herpes/HSV no  History of other STI (gonorrhea, chlamydia, trich) no  History of prior  no  History of prior  YES  History of  delivery prior to 36 weeks 6 days no  History of blood transfusion No  Ok for blood transfusion YES    Substance screening- if yes outside of tobacco use order urine drug screen  History of tobacco use no  Currently using tobacco no  Currently using alcohol no  Presently using drugs no  Past drug use  no  IV drug use-If yes add Hep C antibody to labs no  Partner drug use no  Parent/Family drug use no    MRSA Screening-   Does the pt have a hx of MRSA? no  If yes- please follow MRSA protocol and obtain a nasal swab for MRSA culture    Immunizations:  Influenza vaccine given this season : Yes, received this season  Discussed Tdap vaccine Yes  Discussed COVID Vaccine Yes    Genetic/MFM-  Do you or your partner have a history of any of the following in yourselves or first degree relatives? Cystic fibrosis no  Spinal muscular atrophy no  Hemoglobinopathy/Sickle Cell/Thalassemia no  Fragile X Intellectual Disability no    If yes, discuss carrier screening and recommend consultation with [facility]/genetic counseling  If no, discuss option for carrier screening and/or genetic testing with [facility]  Patient interested Yes  Appointment at [facility] made Patient to call  Referral placed  Understands time sensitive  Interview education  [facility] Pregnancy Essentials Book reviewed and discussed: Directed to [facility]/Women's Health/Obstetrics  Nurse/Family Partnership- patient may qualify: No  Prenatal lab work scripts   Extra labs ordered: Yes  1 hr Glucose      The patient has a history now or in prior pregnancy notable for: None      Details that I feel the provider should be aware of:  Elevated BMI  C/S for failure to progress  LGA  PN1 visit scheduled  The patient was oriented to our practice, reviewed delivering physicians and [facility] [employer] for Delivery  All questions were answered  This was a telephone visit which lasted approximately 45 minutes      Interviewed by: MARY Malcolm

## 2021-03-29 ENCOUNTER — APPOINTMENT (OUTPATIENT)
Dept: LAB | Facility: HOSPITAL | Age: 31
End: 2021-03-29
Payer: COMMERCIAL

## 2021-03-29 DIAGNOSIS — Z83.3 FAMILY HISTORY OF DIABETES MELLITUS IN FIRST DEGREE RELATIVE: ICD-10-CM

## 2021-03-29 DIAGNOSIS — Z34.81 PRENATAL CARE, SUBSEQUENT PREGNANCY, FIRST TRIMESTER: ICD-10-CM

## 2021-03-29 LAB
ABO GROUP BLD: NORMAL
BACTERIA UR QL AUTO: NORMAL /HPF
BASOPHILS # BLD AUTO: 0.03 THOUSANDS/ΜL (ref 0–0.1)
BASOPHILS NFR BLD AUTO: 0 % (ref 0–1)
BILIRUB UR QL STRIP: NEGATIVE
BLD GP AB SCN SERPL QL: NEGATIVE
CLARITY UR: CLEAR
COLOR UR: NORMAL
EOSINOPHIL # BLD AUTO: 0.08 THOUSAND/ΜL (ref 0–0.61)
EOSINOPHIL NFR BLD AUTO: 1 % (ref 0–6)
ERYTHROCYTE [DISTWIDTH] IN BLOOD BY AUTOMATED COUNT: 14.3 % (ref 11.6–15.1)
GLUCOSE 1H P 50 G GLC PO SERPL-MCNC: 172 MG/DL
GLUCOSE UR STRIP-MCNC: NEGATIVE MG/DL
HCT VFR BLD AUTO: 39.3 % (ref 34.8–46.1)
HGB BLD-MCNC: 12.6 G/DL (ref 11.5–15.4)
HGB UR QL STRIP.AUTO: NEGATIVE
IMM GRANULOCYTES # BLD AUTO: 0.03 THOUSAND/UL (ref 0–0.2)
IMM GRANULOCYTES NFR BLD AUTO: 0 % (ref 0–2)
KETONES UR STRIP-MCNC: NEGATIVE MG/DL
LEUKOCYTE ESTERASE UR QL STRIP: NEGATIVE
LYMPHOCYTES # BLD AUTO: 1.77 THOUSANDS/ΜL (ref 0.6–4.47)
LYMPHOCYTES NFR BLD AUTO: 22 % (ref 14–44)
MCH RBC QN AUTO: 26 PG (ref 26.8–34.3)
MCHC RBC AUTO-ENTMCNC: 32.1 G/DL (ref 31.4–37.4)
MCV RBC AUTO: 81 FL (ref 82–98)
MONOCYTES # BLD AUTO: 0.44 THOUSAND/ΜL (ref 0.17–1.22)
MONOCYTES NFR BLD AUTO: 6 % (ref 4–12)
NEUTROPHILS # BLD AUTO: 5.65 THOUSANDS/ΜL (ref 1.85–7.62)
NEUTS SEG NFR BLD AUTO: 71 % (ref 43–75)
NITRITE UR QL STRIP: NEGATIVE
NON-SQ EPI CELLS URNS QL MICRO: NORMAL /HPF
NRBC BLD AUTO-RTO: 0 /100 WBCS
PH UR STRIP.AUTO: 6 [PH]
PLATELET # BLD AUTO: 311 THOUSANDS/UL (ref 149–390)
PMV BLD AUTO: 10 FL (ref 8.9–12.7)
PROT UR STRIP-MCNC: NEGATIVE MG/DL
RBC # BLD AUTO: 4.85 MILLION/UL (ref 3.81–5.12)
RBC #/AREA URNS AUTO: NORMAL /HPF
RH BLD: POSITIVE
SP GR UR STRIP.AUTO: <=1.005 (ref 1–1.03)
SPECIMEN EXPIRATION DATE: NORMAL
UROBILINOGEN UR QL STRIP.AUTO: 0.2 E.U./DL
WBC # BLD AUTO: 8 THOUSAND/UL (ref 4.31–10.16)
WBC #/AREA URNS AUTO: NORMAL /HPF

## 2021-03-29 PROCEDURE — 87086 URINE CULTURE/COLONY COUNT: CPT

## 2021-03-29 PROCEDURE — 81001 URINALYSIS AUTO W/SCOPE: CPT

## 2021-03-29 PROCEDURE — 80081 OBSTETRIC PANEL INC HIV TSTG: CPT

## 2021-03-29 PROCEDURE — 82950 GLUCOSE TEST: CPT

## 2021-03-29 PROCEDURE — 36415 COLL VENOUS BLD VENIPUNCTURE: CPT

## 2021-03-30 LAB
BACTERIA UR CULT: NORMAL
HBV SURFACE AG SER QL: NORMAL
HIV 1+2 AB+HIV1 P24 AG SERPL QL IA: NORMAL
RPR SER QL: NORMAL
RUBV IGG SERPL IA-ACNC: 48.8 IU/ML

## 2021-03-31 ENCOUNTER — TELEPHONE (OUTPATIENT)
Dept: OBGYN CLINIC | Facility: CLINIC | Age: 31
End: 2021-03-31

## 2021-03-31 DIAGNOSIS — O99.810 ABNORMAL GLUCOSE AFFECTING PREGNANCY: Primary | ICD-10-CM

## 2021-03-31 NOTE — TELEPHONE ENCOUNTER
Per comm consent lm in detail about 3hr GTT and process also reviewed other labs were WNL  Order placed

## 2021-04-05 ENCOUNTER — APPOINTMENT (OUTPATIENT)
Dept: LAB | Facility: HOSPITAL | Age: 31
End: 2021-04-05
Payer: COMMERCIAL

## 2021-04-05 DIAGNOSIS — O99.810 ABNORMAL GLUCOSE AFFECTING PREGNANCY: ICD-10-CM

## 2021-04-05 LAB — GLUCOSE P FAST SERPL-MCNC: 96 MG/DL (ref 65–99)

## 2021-04-05 PROCEDURE — 82951 GLUCOSE TOLERANCE TEST (GTT): CPT

## 2021-04-05 PROCEDURE — 36415 COLL VENOUS BLD VENIPUNCTURE: CPT

## 2021-04-07 ENCOUNTER — TELEPHONE (OUTPATIENT)
Dept: OBGYN CLINIC | Facility: CLINIC | Age: 31
End: 2021-04-07

## 2021-04-07 ENCOUNTER — INITIAL PRENATAL (OUTPATIENT)
Dept: OBGYN CLINIC | Facility: MEDICAL CENTER | Age: 31
End: 2021-04-07

## 2021-04-07 VITALS — SYSTOLIC BLOOD PRESSURE: 128 MMHG | DIASTOLIC BLOOD PRESSURE: 78 MMHG | WEIGHT: 251 LBS | BODY MASS INDEX: 44.46 KG/M2

## 2021-04-07 DIAGNOSIS — Z12.4 ENCOUNTER FOR PAPANICOLAOU SMEAR FOR CERVICAL CANCER SCREENING: ICD-10-CM

## 2021-04-07 DIAGNOSIS — O24.410 DIET CONTROLLED GESTATIONAL DIABETES MELLITUS (GDM) IN SECOND TRIMESTER: ICD-10-CM

## 2021-04-07 DIAGNOSIS — O09.90 SUPERVISION OF HIGH RISK PREGNANCY, ANTEPARTUM: Primary | ICD-10-CM

## 2021-04-07 DIAGNOSIS — O09.299 PRIOR FETAL MACROSOMIA, ANTEPARTUM: ICD-10-CM

## 2021-04-07 DIAGNOSIS — O24.419 GESTATIONAL DIABETES MELLITUS (GDM) IN SECOND TRIMESTER, GESTATIONAL DIABETES METHOD OF CONTROL UNSPECIFIED: Primary | ICD-10-CM

## 2021-04-07 DIAGNOSIS — Z11.51 SCREENING FOR HPV (HUMAN PAPILLOMAVIRUS): ICD-10-CM

## 2021-04-07 DIAGNOSIS — O99.210 MATERNAL MORBID OBESITY, ANTEPARTUM (HCC): ICD-10-CM

## 2021-04-07 DIAGNOSIS — O34.219 PREGNANCY WITH HISTORY OF CESAREAN SECTION, ANTEPARTUM: ICD-10-CM

## 2021-04-07 DIAGNOSIS — Z34.91 ENCOUNTER FOR PREGNANCY RELATED EXAMINATION IN FIRST TRIMESTER: ICD-10-CM

## 2021-04-07 DIAGNOSIS — E66.01 MATERNAL MORBID OBESITY, ANTEPARTUM (HCC): ICD-10-CM

## 2021-04-07 PROCEDURE — PNV: Performed by: NURSE PRACTITIONER

## 2021-04-07 PROCEDURE — G0145 SCR C/V CYTO,THINLAYER,RESCR: HCPCS | Performed by: NURSE PRACTITIONER

## 2021-04-07 PROCEDURE — 87491 CHLMYD TRACH DNA AMP PROBE: CPT | Performed by: NURSE PRACTITIONER

## 2021-04-07 PROCEDURE — 87591 N.GONORRHOEAE DNA AMP PROB: CPT | Performed by: NURSE PRACTITIONER

## 2021-04-07 PROCEDURE — 87624 HPV HI-RISK TYP POOLED RSLT: CPT | Performed by: NURSE PRACTITIONER

## 2021-04-08 PROBLEM — E66.01 MATERNAL MORBID OBESITY, ANTEPARTUM (HCC): Status: ACTIVE | Noted: 2021-04-08

## 2021-04-08 PROBLEM — O99.210 MATERNAL MORBID OBESITY, ANTEPARTUM (HCC): Status: ACTIVE | Noted: 2021-04-08

## 2021-04-08 PROBLEM — O34.219 PREGNANCY WITH HISTORY OF CESAREAN SECTION, ANTEPARTUM: Status: ACTIVE | Noted: 2021-04-08

## 2021-04-08 PROBLEM — N91.1 SECONDARY AMENORRHEA: Status: RESOLVED | Noted: 2021-03-18 | Resolved: 2021-04-08

## 2021-04-08 PROBLEM — O09.299 PRIOR FETAL MACROSOMIA, ANTEPARTUM: Status: ACTIVE | Noted: 2021-04-08

## 2021-04-08 PROBLEM — Z98.891 S/P CESAREAN SECTION: Status: RESOLVED | Noted: 2018-11-23 | Resolved: 2021-04-08

## 2021-04-08 PROBLEM — O09.90 SUPERVISION OF HIGH RISK PREGNANCY, ANTEPARTUM: Status: ACTIVE | Noted: 2021-04-08

## 2021-04-08 LAB
HPV HR 12 DNA CVX QL NAA+PROBE: NEGATIVE
HPV16 DNA CVX QL NAA+PROBE: NEGATIVE
HPV18 DNA CVX QL NAA+PROBE: NEGATIVE

## 2021-04-08 NOTE — ASSESSMENT & PLAN NOTE
Elevated 1hr glucola -> 3hr gtt today with elevated fasting (96), thus no further glucose levels were obtained  We discussed ruling in for GDM and referral was provided for Diabetic Ed program    Reviewed management plan, GDM diet, 3rd trimester growth surveillance

## 2021-04-08 NOTE — ASSESSMENT & PLAN NOTE
Pregravid BMI 44  Reviewed maternal weight gain recommendations   3rd trimester growth surveillance is planned

## 2021-04-08 NOTE — ASSESSMENT & PLAN NOTE
History of prior  section in 2018  Indication per op note is maternal request  This was in the context of post-dates induction  She progressed to 9cm prior to requesting operative delivery  Today we reviewed risks/benefits of  versus repeat C/s  The patient expresses desire for repeat C/s  She and spouse report their family will likely be complete after this baby and no future pregs would be planned

## 2021-04-08 NOTE — ASSESSMENT & PLAN NOTE
PN1  Planned pregnancy  She is accompanied today by her     #1: term LTC/s for maternal request (PD-IOL, progressed to 9cm); birthweight >4100gm  See separate subheadings re: the following:  - GDMA1  - h/o prior C/s   - h/o fetal macrosomia in prior preg  - maternal morbid obesity    The patient denies complaints  Denies pelvic pain, bleeding, LOF  Exam WNL   by limited bedside US  Prenatal labs WNL  Rh pos  Pap and gc/chl sent today  Reviewed options for low risk aneuploidy screening  Discussed risks/benefits/limitations  The patient reports she is scheduled for SS and L2  She is s/p flu vaccine this season  Reviewed diet and activity recommendations, practice set up, visit intervals and reasons to call  RTO in 4 weeks

## 2021-04-08 NOTE — PROGRESS NOTES
Problem List Items Addressed This Visit        Endocrine    Diet controlled gestational diabetes mellitus (GDM) in second trimester     Elevated 1hr glucola -> 3hr gtt today with elevated fasting (96), thus no further glucose levels were obtained  We discussed ruling in for GDM and referral was provided for Diabetic Ed program    Reviewed management plan, GDM diet, 3rd trimester growth surveillance  Relevant Orders    Ambulatory Referral to Maternal Fetal Medicine       Other    Maternal morbid obesity, antepartum ([address] )     Pregravid BMI 44  Reviewed maternal weight gain recommendations  3rd trimester growth surveillance is planned         Prior fetal macrosomia, antepartum     Baby #1 with birthweight >4100gm  Pregnancy with history of  section, antepartum     History of prior  section in 2018  Indication per op note is maternal request  This was in the context of post-dates induction  She progressed to 9cm prior to requesting operative delivery  Today we reviewed risks/benefits of  versus repeat C/s  The patient expresses desire for repeat C/s  She and spouse report their family will likely be complete after this baby and no future pregs would be planned  Supervision of high risk pregnancy, antepartum - Primary     PN1  Planned pregnancy  She is accompanied today by her     #1: term LTC/s for maternal request (PD-IOL, progressed to 9cm); birthweight >4100gm  See separate subheadings re: the following:  - GDMA1  - h/o prior C/s   - h/o fetal macrosomia in prior preg  - maternal morbid obesity    The patient denies complaints  Denies pelvic pain, bleeding, LOF  Exam WNL   by limited bedside US  Prenatal labs WNL  Rh pos  Pap and gc/chl sent today  Reviewed options for low risk aneuploidy screening  Discussed risks/benefits/limitations  The patient reports she is scheduled for SS and L2  She is s/p flu vaccine this season    Reviewed diet and activity recommendations, practice set up, visit intervals and reasons to call  RTO in 4 weeks              Relevant Orders    Ambulatory Referral to Maternal Fetal Medicine      Other Visit Diagnoses     Encounter for Papanicolaou smear for cervical cancer screening        Screening for HPV (human papillomavirus)        Relevant Orders    Liquid-based pap, screening    Encounter for pregnancy related examination in first trimester        Relevant Orders    Chlamydia/GC amplified DNA by PCR

## 2021-04-09 DIAGNOSIS — Z23 ENCOUNTER FOR IMMUNIZATION: ICD-10-CM

## 2021-04-09 LAB
C TRACH DNA SPEC QL NAA+PROBE: NEGATIVE
N GONORRHOEA DNA SPEC QL NAA+PROBE: NEGATIVE

## 2021-04-14 ENCOUNTER — TELEMEDICINE (OUTPATIENT)
Dept: PERINATAL CARE | Facility: CLINIC | Age: 31
End: 2021-04-14
Payer: COMMERCIAL

## 2021-04-14 VITALS — WEIGHT: 251 LBS | BODY MASS INDEX: 44.47 KG/M2 | HEIGHT: 63 IN

## 2021-04-14 DIAGNOSIS — Z3A.13 13 WEEKS GESTATION OF PREGNANCY: ICD-10-CM

## 2021-04-14 DIAGNOSIS — O24.419 GESTATIONAL DIABETES MELLITUS (GDM) IN SECOND TRIMESTER, GESTATIONAL DIABETES METHOD OF CONTROL UNSPECIFIED: Primary | ICD-10-CM

## 2021-04-14 DIAGNOSIS — O99.210 MATERNAL MORBID OBESITY, ANTEPARTUM (HCC): ICD-10-CM

## 2021-04-14 DIAGNOSIS — E66.01 MATERNAL MORBID OBESITY, ANTEPARTUM (HCC): ICD-10-CM

## 2021-04-14 LAB
LAB AP GYN PRIMARY INTERPRETATION: NORMAL
Lab: NORMAL

## 2021-04-14 PROCEDURE — 99215 OFFICE O/P EST HI 40 MIN: CPT | Performed by: NURSE PRACTITIONER

## 2021-04-14 RX ORDER — LANCETS 33 GAUGE
EACH MISCELLANEOUS
Qty: 100 EACH | Refills: 3 | Status: SHIPPED | OUTPATIENT
Start: 2021-04-14 | End: 2021-07-21

## 2021-04-14 RX ORDER — BLOOD-GLUCOSE METER
EACH MISCELLANEOUS
Qty: 1 KIT | Refills: 0 | Status: SHIPPED | OUTPATIENT
Start: 2021-04-14 | End: 2021-10-16 | Stop reason: HOSPADM

## 2021-04-14 RX ORDER — BLOOD SUGAR DIAGNOSTIC
STRIP MISCELLANEOUS
Qty: 100 EACH | Refills: 3 | Status: SHIPPED | OUTPATIENT
Start: 2021-04-14 | End: 2021-07-08

## 2021-04-14 NOTE — ASSESSMENT & PLAN NOTE
-Pre-pregnancy weight 249 lbs  -Current weight 251 lbs  -Recommended weight gain during pregnancy 11 to 20 lbs  -Start GDM diet and walking up to 30 minutes a day  -Follow up with dietitian

## 2021-04-14 NOTE — PATIENT INSTRUCTIONS
-Check A1c and CMP  -A1c goal is 5 6% or less   -Keep dietitian appointment as scheduled  -Stay in close contact with diabetes education team   -Insulin requirements during pregnancy; basal/bolus concept and Metformin discussed  -Very important to maintain tight glucose control during pregnancy to decrease risk factors including fetal macrosomia; birth injury; risk of ; polyhydramnios; pre-term labor; pre-eclampsia;  hypoglycemia; jaundice and stillbirth  -[employer]ia [employer] diabetes and pregnancy booklet; diet plan and hypoglycemia patient education  1  Start self monitoring blood glucose fasting and 2 hours after start of each meal  Keep glucose log  Glucose goals: fasting 60-90 mg/dL, 140 mg/dL or less 1 hour post meals, and 120 mg/dL or less 2 hours post meal    2  Report glucose readings weekly via [employer] every Wednesday  3  Start GDM diet with 3 meals and 3 snacks including recommended combination of carb, protein and fat per meal/snack  Minimum total daily carbohydrates in first trimester 135 grams and in 2nd/3rd trimester 175 grams a day  Always pair carbohydrates with protein  4  Please eat meal or snack every 2-3 5 hours while awake  5  No more than 8 to 10 hours of fasting overnight from bedtime snack to breakfast meal   6  Stay active if no restriction from your OB, walk up to 30 minutes a day  7  Always have glucose available to treat hypoglycemia  Use 15:15 rule  Refer to hypoglycemia patient education sheet  Test blood sugar when experiencing signs and symptoms of hypoglycemia and prior to driving  8  Continue prenatal vitamin as recommended  9  Continue follow-up with your OB and MFM as recommended  10  Follow up in: 2 months

## 2021-04-14 NOTE — PROGRESS NOTES
Virtual Regular Visit      Assessment/Plan:    Problem List Items Addressed This Visit        Endocrine    Gestational diabetes mellitus (GDM) in first trimester - Primary     -Check A1c and CMP  -A1c goal is 5 6% or less   -Start GDM diet and SMBG  -Report via glucose flowsheet every Wednesday  No results found for: HGBA1C         Relevant Medications    Blood Glucose Monitoring Suppl (OneTouch Verio Flex System) w/Device KIT    OneTouch Delica Lancets 00T MISC    OneTouch Verio test strip       Other    BMI 40 0-44 9, adult ([facility])    Relevant Orders    Hemoglobin A1C    Comprehensive metabolic panel    Maternal morbid obesity, antepartum ([facility] [address] )     -Pre-pregnancy weight 249 lbs  -Current weight 251 lbs  -Recommended weight gain during pregnancy 11 to 20 lbs  -Start GDM diet and walking up to 30 minutes a day  -Follow up with dietitian  Relevant Orders    Hemoglobin A1C    Comprehensive metabolic panel      Other Visit Diagnoses     13 weeks gestation of pregnancy        Relevant Orders    Hemoglobin A1C    Comprehensive metabolic panel        -Keep dietitian appointment as scheduled  -Stay in close contact with diabetes education team   -Insulin requirements during pregnancy; basal/bolus concept and Metformin discussed  -Very important to maintain tight glucose control during pregnancy to decrease risk factors including fetal macrosomia; birth injury; risk of ; polyhydramnios; pre-term labor; pre-eclampsia;  hypoglycemia; jaundice and stillbirth  -Via [employer] diabetes and pregnancy booklet; diet plan and hypoglycemia patient education  1  Start self monitoring blood glucose fasting and 2 hours after start of each meal  Keep glucose log  Glucose goals: fasting 60-90 mg/dL, 140 mg/dL or less 1 hour post meals, and 120 mg/dL or less 2 hours post meal    2  Report glucose readings weekly via [employer] every Wednesday    3  Start GDM diet with 3 meals and 3 snacks including recommended combination of carb, protein and fat per meal/snack  Minimum total daily carbohydrates in first trimester 135 grams and in 2nd/3rd trimester 175 grams a day  Always pair carbohydrates with protein  4  Please eat meal or snack every 2-3 5 hours while awake  5  No more than 8 to 10 hours of fasting overnight from bedtime snack to breakfast meal   6  Stay active if no restriction from your OB, walk up to 30 minutes a day  7  Always have glucose available to treat hypoglycemia  Use 15:15 rule  Refer to hypoglycemia patient education sheet  Test blood sugar when experiencing signs and symptoms of hypoglycemia and prior to driving  8  Continue prenatal vitamin as recommended  9  Continue follow-up with your OB and MFM as recommended  10  Follow up in: 2 months  Reason for visit is   Chief Complaint   Patient presents with    Virtual Regular Visit    Gestational Diabetes    Patient Education        Encounter provider Hannah Vazquez [facility]    Provider located at [address] [place] [id number] [phone]      Recent Visits  No visits were found meeting these conditions  Showing recent visits within past 7 days and meeting all other requirements     Today's Visits  Date Type Provider Dept   04/14/21 Telemedicine Hannah Vazquez, [address] [place] [address] today's visits and meeting all other requirements     Future Appointments  No visits were found meeting these conditions  Showing future appointments within next 150 days and meeting all other requirements        The patient was identified by name and date of birth  Denise Garcia was informed that this is a telemedicine visit and that the visit is being conducted through [employer] and patient was informed that this is a secure, HIPAA-compliant platform  She agrees to proceed  My office door was closed  No one else was in the room    She acknowledged consent and understanding of privacy and security of the video platform  The patient has agreed to participate and understands they can discontinue the visit at any time  Patient is aware this is a billable service  Subjective  Momo De Los Santos is a 32 y o  female  13 1/7 weeks gestation GDM  No history of GDM  Was not a breakfast person but started eating breakfast around 8 AM, stopped drinking coffee, has a snack around 10 AM, eats lunch around noon and dinner at 6 PM  Downloaded myfitness pal yvonne and has been researching information on line  Has 3 yo daughter who weighed 9 lbs 2 oz at birth at 39 6/7 weeks gestation via  due to failure to progress  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  Past Medical History:   Diagnosis Date    Varicella     vacc       Past Surgical History:   Procedure Laterality Date    [facility]  DELIVERY ONLY N/A 11/15/2018    Procedure:  SECTION (); Surgeon: Gina Styles DO;  [facility];  Service: Obstetrics    WISDOM TOOTH EXTRACTION             Current Outpatient Medications   Medication Sig Dispense Refill    Prenatal Vit-Fe Fumarate-FA (PRENATAL COMPLETE PO) Take by mouth      acetaminophen (TYLENOL) 325 mg tablet Take 2 tablets (650 mg total) by mouth every 4 (four) hours as needed for mild pain (Patient not taking: Reported on 2021) 30 tablet 0    Blood Glucose Monitoring Suppl (OneTouch Verio Flex System) w/Device KIT Dispense 1 kit per insurance formulary  1 kit 0    MAGNESIUM SULFATE PO Take by mouth      OneTouch Delica Lancets 63M MISC Use 4 a day or as directed  100 each 3    OneTouch Verio test strip Test 4 times a day and as instructed  Gestational diabetes  100 each 3     No current facility-administered medications for this visit  No Known Allergies    Review of Systems   Constitutional: Negative for fatigue and fever  HENT: Negative for congestion, sore throat and trouble swallowing      Eyes: Negative for visual disturbance  Respiratory: Negative for cough and shortness of breath  Cardiovascular: Negative for chest pain and palpitations  Gastrointestinal: Negative for constipation, nausea and vomiting  Endocrine: Negative for polydipsia, polyphagia and polyuria  Genitourinary: Negative for difficulty urinating and vaginal bleeding  Musculoskeletal: Negative for back pain  Neurological: Negative for headaches  Psychiatric/Behavioral: Negative for sleep disturbance  Video Exam    Vitals:    04/14/21 1437   Weight: 114 kg (251 lb)   Height: 5' 3" (1 6 m)       Physical Exam   It was my intent to perform this visit via video technology but the patient was not able to do a video connection so the visit was completed via audio telephone only  Visit completed via Teams audio but no camera  I spent 60 minutes with patient today in which greater than 50% of the time was spent in counseling/coordination of care regarding GDM diet, plan of care, reviewing risk factors, etc       VIRTUAL VISIT DISCLAIMER    Diane Patel acknowledges that she has consented to an online visit or consultation  She understands that the online visit is based solely on information provided by her, and that, in the absence of a face-to-face physical evaluation by the physician, the diagnosis she receives is both limited and provisional in terms of accuracy and completeness  This is not intended to replace a full medical face-to-face evaluation by the physician  Diane Patel understands and accepts these terms

## 2021-04-16 ENCOUNTER — ROUTINE PRENATAL (OUTPATIENT)
Dept: PERINATAL CARE | Facility: OTHER | Age: 31
End: 2021-04-16
Payer: COMMERCIAL

## 2021-04-16 ENCOUNTER — APPOINTMENT (OUTPATIENT)
Dept: LAB | Facility: HOSPITAL | Age: 31
End: 2021-04-16
Payer: COMMERCIAL

## 2021-04-16 VITALS
BODY MASS INDEX: 44.47 KG/M2 | DIASTOLIC BLOOD PRESSURE: 83 MMHG | SYSTOLIC BLOOD PRESSURE: 130 MMHG | HEART RATE: 105 BPM | HEIGHT: 63 IN | WEIGHT: 251 LBS

## 2021-04-16 DIAGNOSIS — Z36.82 ENCOUNTER FOR (NT) NUCHAL TRANSLUCENCY SCAN: ICD-10-CM

## 2021-04-16 DIAGNOSIS — O24.419 GESTATIONAL DIABETES MELLITUS (GDM) IN FIRST TRIMESTER, GESTATIONAL DIABETES METHOD OF CONTROL UNSPECIFIED: Primary | ICD-10-CM

## 2021-04-16 DIAGNOSIS — O34.219 HISTORY OF CESAREAN DELIVERY, ANTEPARTUM: ICD-10-CM

## 2021-04-16 DIAGNOSIS — O24.419 GESTATIONAL DIABETES MELLITUS (GDM) IN SECOND TRIMESTER, GESTATIONAL DIABETES METHOD OF CONTROL UNSPECIFIED: ICD-10-CM

## 2021-04-16 DIAGNOSIS — O99.210 MATERNAL MORBID OBESITY, ANTEPARTUM (HCC): ICD-10-CM

## 2021-04-16 DIAGNOSIS — Z34.81 PRENATAL CARE, SUBSEQUENT PREGNANCY, FIRST TRIMESTER: ICD-10-CM

## 2021-04-16 DIAGNOSIS — E66.01 MATERNAL MORBID OBESITY, ANTEPARTUM (HCC): ICD-10-CM

## 2021-04-16 DIAGNOSIS — Z3A.13 13 WEEKS GESTATION OF PREGNANCY: ICD-10-CM

## 2021-04-16 DIAGNOSIS — O34.219 PREGNANCY WITH HISTORY OF CESAREAN SECTION, ANTEPARTUM: ICD-10-CM

## 2021-04-16 PROBLEM — O24.410 DIET CONTROLLED GESTATIONAL DIABETES MELLITUS (GDM) IN SECOND TRIMESTER: Status: RESOLVED | Noted: 2021-04-07 | Resolved: 2021-04-16

## 2021-04-16 LAB
ALBUMIN SERPL BCP-MCNC: 2.9 G/DL (ref 3.5–5)
ALP SERPL-CCNC: 54 U/L (ref 46–116)
ALT SERPL W P-5'-P-CCNC: 31 U/L (ref 12–78)
ANION GAP SERPL CALCULATED.3IONS-SCNC: 9 MMOL/L (ref 4–13)
AST SERPL W P-5'-P-CCNC: 15 U/L (ref 5–45)
BILIRUB SERPL-MCNC: 0.24 MG/DL (ref 0.2–1)
BUN SERPL-MCNC: 11 MG/DL (ref 5–25)
CALCIUM ALBUM COR SERPL-MCNC: 9.7 MG/DL (ref 8.3–10.1)
CALCIUM SERPL-MCNC: 8.8 MG/DL (ref 8.3–10.1)
CHLORIDE SERPL-SCNC: 102 MMOL/L (ref 100–108)
CO2 SERPL-SCNC: 23 MMOL/L (ref 21–32)
CREAT SERPL-MCNC: 0.67 MG/DL (ref 0.6–1.3)
EST. AVERAGE GLUCOSE BLD GHB EST-MCNC: 108 MG/DL
GFR SERPL CREATININE-BSD FRML MDRD: 118 ML/MIN/1.73SQ M
GLUCOSE P FAST SERPL-MCNC: 85 MG/DL (ref 65–99)
HBA1C MFR BLD: 5.4 %
POTASSIUM SERPL-SCNC: 3.9 MMOL/L (ref 3.5–5.3)
PROT SERPL-MCNC: 7.1 G/DL (ref 6.4–8.2)
SODIUM SERPL-SCNC: 134 MMOL/L (ref 136–145)

## 2021-04-16 PROCEDURE — 76801 OB US < 14 WKS SINGLE FETUS: CPT | Performed by: OBSTETRICS & GYNECOLOGY

## 2021-04-16 PROCEDURE — 76813 OB US NUCHAL MEAS 1 GEST: CPT | Performed by: OBSTETRICS & GYNECOLOGY

## 2021-04-16 PROCEDURE — 80053 COMPREHEN METABOLIC PANEL: CPT

## 2021-04-16 PROCEDURE — 99213 OFFICE O/P EST LOW 20 MIN: CPT | Performed by: OBSTETRICS & GYNECOLOGY

## 2021-04-16 PROCEDURE — 36415 COLL VENOUS BLD VENIPUNCTURE: CPT | Performed by: NURSE PRACTITIONER

## 2021-04-16 PROCEDURE — 83036 HEMOGLOBIN GLYCOSYLATED A1C: CPT | Performed by: NURSE PRACTITIONER

## 2021-04-16 NOTE — PATIENT INSTRUCTIONS
Thank you for choosing us for your  care today  If you have any questions about your ultrasound or care, please do not hesitate to contact us or your primary obstetrician  Please communicate your blood sugars at least weekly with the diabetic educators at the [address] [place] [address] Diabetes in Pregnancy program   The telephone number is [phone]  The e-mail address is [email or web address]  If you do not hear back from the program within 48 hours of sending your blood sugars, please call [facility] at [phone]  Thank you  Some general instructions for your pregnancy are:     Protect against coronavirus: Continue to practice social distancing, wear a mask, and wash your hands often  Pregnant women are increased risk of severe COVID  Notify your primary care doctor if you have any symptoms including cough, shortness of breath or difficulty breathing, fever, chills, muscle pain, sore throat, or loss of taste or smell  Pregnant women can receive the coronavirus vaccine   Exercise: Aim for 22 minutes per day (150 minutes per week) of regular exercise  Walking is great!  Nutrition: aim for calcium-rich and iron-rich foods as well as healthy sources of protein   Protect against the flu: get yourself and your entire household vaccinated against influenza  This will protect your baby   Learn about Preeclampsia: preeclampsia is a common, serious high blood pressure complication in pregnancy  A blood pressure of 698CFIK (systolic or top number) or 96PPIV (diastolic or bottom number) is not normal and needs evaluation by your doctor   If you smoke, try to reduce how many cigarettes you smoke or try to quit completely  Do not vape       Other warning signs to watch out for in pregnancy or postpartum: chest pain, obstructed breathing or shortness of breath, seizures, thoughts of hurting yourself or your baby, bleeding, a painful or swollen leg, fever, or headache (see [facility] POST-BIRTH Warning Signs campaign)  If these happen call 911  Itching is also not normal in pregnancy and if you experience this, especially over your hands and feet, potentially worse at night, notify your doctors   Lastly, if you are contacted regarding participation in a survey about your experience in our office, please know that we take any feedback you provide seriously and use it to improve how we deliver care through our center

## 2021-04-16 NOTE — LETTER
April 16, 2021     [address] [address]    Patient: Jorge Deluca   YOB: 1990   Date of Visit: 4/16/2021       Dear Trisha Boyle: Thank you for referring Jorge Deluca to me for evaluation  Below are my notes for this consultation  If you have questions, please do not hesitate to call me  I look forward to following your patient along with you  Sincerely,        Claire Akbar MD        CC: No Recipients  Claire Akbar MD  4/16/2021  9:57 AM  Sign when Signing Visit  [address]: Ms Dell Chiu was seen today at 13w3d for nuchal translucency ultrasound  See ultrasound report under "OB Procedures" tab  Please don't hesitate to contact our office with any concerns or questions    Claire Akbar MD

## 2021-04-21 ENCOUNTER — TELEPHONE (OUTPATIENT)
Dept: PERINATAL CARE | Facility: CLINIC | Age: 31
End: 2021-04-21

## 2021-04-21 LAB
# FETUSES US: 1
AGE: NORMAL
B-HCG ADJ MOM SERPL: 0.74
B-HCG SERPL-ACNC: 46.4 IU/ML
COLLECT DATE: NORMAL
CURRENT SMOKER: NO
DONATED EGG PATIENT QL: NO
FET CRL US.MEAS: 70 MM
FET CRL US.MEAS: NORMAL MM
FET NUCHAL FOLD MOM THICKNESS US.MEAS: 0.92
FET NUCHAL FOLD THICKNESS US.MEAS: 1.5 MM
FET NUCHAL FOLD THICKNESS US.MEAS: NORMAL MM
FET TS 21 RISK FROM MAT AGE: NORMAL
GA CLIN EST: 13 WK
GA METHOD: NORMAL
HX OF NTD NARR: NO
HX OF TRISOMY 21 NARR: NO
IDDM PATIENT QL: NO
INTEGRATED SCN PATIENT-IMP: NORMAL
IVF PREGNANCY: NORMAL
PAPP-A MOM SERPL: 1.1
PAPP-A SERPL-MCNC: 666.4 NG/ML
PHYSICIAN NPI: NORMAL
SL AMB NASAL BONE: PRESENT
SL AMB NTQR LOCATION ID#: NORMAL
SL AMB NTQR READING PHYS ID#: NORMAL
SL AMB REFERRING PHYSICIAN NAME: NORMAL
SL AMB REFERRING PHYSICIAN PHONE: NORMAL
SL AMB REPEAT SPECIMEN: NO
SL AMB TWIN B NASAL BONE: NORMAL
SONOGRAPHER NAME: NORMAL
SONOGRAPHER: NORMAL
SONOGRAPHER: NORMAL
TS 18 RISK FETUS: NORMAL
TS 21 RISK FETUS: NORMAL
US DATE: NORMAL
US FETUSES STUDY [IMP]: NORMAL

## 2021-04-22 ENCOUNTER — TELEPHONE (OUTPATIENT)
Dept: PERINATAL CARE | Facility: OTHER | Age: 31
End: 2021-04-22

## 2021-04-22 NOTE — LETTER
04/22/21  Barbi Sorto  1990    Thank you for completing Part 1 of your [facility] Stepwise Sequential Screen  To obtain a complete test result, please complete blood work for Part 2 Sequential Screen between the weeks of 4/30/2021 to 5/13/2021  ([address])      LAST DAY  6/24/2021     Based on your insurance coverage, please use one of the following [facility] lab locations  The other option is to go to [email or web address]  Call our office for any questions at [phone]          Sincerely,    Lissette Lopez RN

## 2021-04-22 NOTE — TELEPHONE ENCOUNTER
----- Message from Elton Piper MD sent at 2021  3:34 PM EDT -----  Hi  RN staff, I've reviewed this Sequential Part 1 result which shows low preliminary risk for the conditions tested (trisomies 21 and 18), can you call her to inform her of this result? Please also mail her the requisition form for the Sequential Screen Part 2  Thank you    Elton Piper MD

## 2021-04-22 NOTE — TELEPHONE ENCOUNTER
Left message with normal step wise 1 result, instructions for part 2 and call back number at the # provided on communication consent, TRF mailed

## 2021-04-30 ENCOUNTER — DOCUMENTATION (OUTPATIENT)
Dept: PERINATAL CARE | Facility: CLINIC | Age: 31
End: 2021-04-30

## 2021-04-30 ENCOUNTER — TELEMEDICINE (OUTPATIENT)
Dept: PERINATAL CARE | Facility: CLINIC | Age: 31
End: 2021-04-30
Payer: COMMERCIAL

## 2021-04-30 DIAGNOSIS — O99.212 OBESITY COMPLICATING PREGNANCY IN SECOND TRIMESTER: ICD-10-CM

## 2021-04-30 DIAGNOSIS — Z3A.15 15 WEEKS GESTATION OF PREGNANCY: ICD-10-CM

## 2021-04-30 DIAGNOSIS — O24.410 DIET CONTROLLED GESTATIONAL DIABETES MELLITUS (GDM) IN SECOND TRIMESTER: Primary | ICD-10-CM

## 2021-04-30 PROCEDURE — G0108 DIAB MANAGE TRN  PER INDIV: HCPCS | Performed by: DIETITIAN, REGISTERED

## 2021-04-30 NOTE — PROGRESS NOTES
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 no  Family members with diabetes: Mother    Pregnancy History:  How many total pregnancies have you had? 2  How many children do you have? 1  Have you had a baby weighing larger than 9 lbs? Yes 9 lbs 2 oz  Are you having swelling or fluid retention? no  Have you been placed on bedrest? no    Physical Activity:  Do you exercise? yes  Type of Exercise: Walking  Frequency of Exercise: Daily    Nutrition Questionnaire: How many meals do you eat daily? 3  List times of meals: Breakfast: 8 AM/ Lunch: 12-12:20 PM/ Dinner: 6 PM  How many snacks do you eat daily? 3  List times of snacks: AM Snack: 10:20-10:30 AM/ PM Snack: 3:45-4 PM/ Bedtime Snack: 8 PM  What type of diet are you following at home? Low Carbohydrate  Already following the GDM diet  Do you have special or ethnic dietary preferences? no  Do you have any food allergies or intolerances? no    Learning preferences: How do you learn best? Video I& interactive small groups  How do you rate your health? Good  Who is your primary support person? Spouse  How do you cope with stress? Being with her faily & playing outside; used to be a stress eater prior to the GDM diagnosis  Do you have any cultural or Sikh beliefs we should be aware of? no  How do you feel the diabetes diagnosis will affect the rest of your pregnancy?  OK; reports she is now feeeling the best she has since being pregnant  & since she is following the meal plan  Do you receive WIC or food stamps? no

## 2021-04-30 NOTE — PROGRESS NOTES
Date:  21  RE: Nicole Gore    : 1990  Estimated Date of Delivery: 10/19/21  EGA: 15w3d  OB/GYN: Emma Diop  Diet controlled gestational daibetes          Reports on Glucose Flow Sheet; discussed at Modified Class 1 today    Current regimen:  2200 calories GDM diet with 3 meals & 3 snacks  Stated she tried many different food at bedtime snack & is not able to lower FBS  Self-Blood Glucose monitoring 4 times daily with a One-Touch Verio glucose meter  Reports she has 10 5 hours between bedtime snack & FBS Agreed to eat up to one hour later for her bedtime snack  Currently walks  Plan:  Continue current regimen  Advised to change bedtime snack to 1 CHO serving (15 gms) & increase to 2-3 oz protein  Advised her to walk each evening after dinner to decrease the FBS May catracho to begin medication soon  Diabetes Self Management Support Plan outside of ongoing care: Spouse/Family    Date due to report next:  Tuesday, 21 to determine if need to begin medication      Zenovia Hare  Diabetes Educator   Diabetes and Pregnancy Program

## 2021-04-30 NOTE — PROGRESS NOTES
Virtual Regular Visit      Assessment/Plan:    Problem List Items Addressed This Visit     None               Reason for visit is   Chief Complaint   Patient presents with    Virtual Regular Visit        Encounter provider Carloz Osman    Provider located at [address] [place] [address] [phone]      Recent Visits  No visits were found meeting these conditions  Showing recent visits within past 7 days and meeting all other requirements     Future Appointments  No visits were found meeting these conditions  Showing future appointments within next 150 days and meeting all other requirements        The patient was identified by name and date of birth  Darrius Winn was informed that this is a telemedicine visit and that the visit is being conducted through [address] [place] [address] and patient was informed that this is a secure, HIPAA-compliant platform  She agrees to proceed     My office door was closed  No one else was in the room  She acknowledged consent and understanding of privacy and security of the video platform  The patient has agreed to participate and understands they can discontinue the visit at any time  Patient is aware this is a billable service  Subjective  Darrius Winn is a 32 y o  female pregnant patient  Dryden Hamm HPI     Past Medical History:   Diagnosis Date    Varicella     vacc       Past Surgical History:   Procedure Laterality Date    [place]  DELIVERY ONLY N/A 11/15/2018    Procedure:  SECTION ();   Surgeon: Castro Bain DO;  [facility] ;  Service: Obstetrics    WISDOM TOOTH EXTRACTION             Current Outpatient Medications   Medication Sig Dispense Refill    acetaminophen (TYLENOL) 325 mg tablet Take 2 tablets (650 mg total) by mouth every 4 (four) hours as needed for mild pain (Patient not taking: Reported on 2021) 30 tablet 0    Blood Glucose Monitoring Suppl (OneTouch Verio Flex System) w/Device KIT Dispense 1 kit per insurance formulary  1 kit 0    MAGNESIUM SULFATE PO Take by mouth      OneTouch Delica Lancets 63Z MISC Use 4 a day or as directed  100 each 3    OneTouch Verio test strip Test 4 times a day and as instructed  Gestational diabetes  100 each 3    Prenatal Vit-Fe Fumarate-FA (PRENATAL COMPLETE PO) Take by mouth       No current facility-administered medications for this visit  No Known Allergies    Review of Systems    Video Exam    There were no vitals filed for this visit  Physical Exam --not completed  i    Time spent with the patient: 60 minutes  VIRTUAL VISIT DISCLAIMER    Darrius Winn acknowledges that she has consented to an online visit or consultation  She understands that the online visit is based solely on information provided by her, and that, in the absence of a face-to-face physical evaluation by the physician, the diagnosis she receives is both limited and provisional in terms of accuracy and completeness  This is not intended to replace a full medical face-to-face evaluation by the physician  Darrius Winn understands and accepts these terms  DATE: 21   RE: Darrius Winn   : 1990  MAGGY: Estimated Date of Delivery: 10/19/21  EGA:  15w3d    Dear Drs  At [facility]:     Thank you for referring your patient to the Diabetes and Pregnancy Program at [address] [place] [address]  The patient was seen today for medical nutrition therapy in a modified individual Class 1 for the treatment of gestational diabetes during pregnancy  In addition to diabetes, the nutrition status is complicated by obesity  The following was reviewed with the patient via virtual telemedicine:      Weight gain during in pregnancy  Based on the patients height of63  inches, pre-pregnancy weight of 249 pounds (BMI 44 1), we would recommend a total weight gain of 11-20 pounds for the pregnancy      o The patients current weight is 251   pounds, and her weight gain to date is 2 pounds  Based on this, we are recommending the patient gain 18 pounds for the remainder of the pregnancy   Basic review of macronutrients   Meal pattern should consist of three small meals and three snacks daily  Has been following the meal plan closely  Advised to change the bedtime snack to 1 CHO serving & 2-3 oz protein to lower FBS   Carbohydrate gram amounts per meal    Instructions on how to read a food label   Appropriate serving size of foods   Incorporating protein at each meal and snack in the importance of protein in relationship to blood glucose control   Individualized meal plan: [address] [facility] gestational diabetes diet   Use of food diary to maintain a meal plan   Report blood glucose levels to [address] weekly or as directed  o Phone: [phone]  If no response in 24 hours, call [phone]   o Fax: [phone]  o Email: [email or web address]   Follow up: 1 month    Patient Stated Goal: "I will plan my meals and snacks every day"     Diabetes Self Management Support Plan outside of ongoing care: Spouse/Family    Thank you for the opportunity to participate in the care of this patient  I can be reached at [phone] should you have any questions  Time spent with patient 3:30-4:35 PM; time spent face to face counseling greater than 50% of the appointment      Sincerely,     Kye Glover  Diabetes Educator  Diabetes and Pregnancy Program

## 2021-05-04 ENCOUNTER — TELEPHONE (OUTPATIENT)
Dept: PERINATAL CARE | Facility: CLINIC | Age: 31
End: 2021-05-04

## 2021-05-04 NOTE — TELEPHONE ENCOUNTER
Called patient to schedule follow up appointment:  30 minute followup with dietician in 1 month (approx 5/28/21)    Left voicemail request patient to call back to schedule at [phone]

## 2021-05-05 ENCOUNTER — ROUTINE PRENATAL (OUTPATIENT)
Dept: OBGYN CLINIC | Facility: MEDICAL CENTER | Age: 31
End: 2021-05-05

## 2021-05-05 VITALS — SYSTOLIC BLOOD PRESSURE: 128 MMHG | WEIGHT: 244 LBS | BODY MASS INDEX: 43.22 KG/M2 | DIASTOLIC BLOOD PRESSURE: 82 MMHG

## 2021-05-05 DIAGNOSIS — O09.90 SUPERVISION OF HIGH RISK PREGNANCY, ANTEPARTUM: Primary | ICD-10-CM

## 2021-05-05 DIAGNOSIS — O24.410 DIET CONTROLLED GESTATIONAL DIABETES MELLITUS (GDM) IN SECOND TRIMESTER: ICD-10-CM

## 2021-05-05 PROBLEM — O24.419 GESTATIONAL DIABETES MELLITUS (GDM) IN FIRST TRIMESTER: Status: RESOLVED | Noted: 2021-04-14 | Resolved: 2021-05-05

## 2021-05-05 PROCEDURE — PNV: Performed by: NURSE PRACTITIONER

## 2021-05-05 NOTE — ASSESSMENT & PLAN NOTE
Denies OB complaints  Not yet aware of fetal movement  Denies contractions, cramping, leakage of fluid or vaginal bleeding  SS part 2 and L2 are scheduled  S/p flu vaccine  Reviewed reasons to call

## 2021-05-05 NOTE — PROGRESS NOTES
Problem List Items Addressed This Visit        Endocrine    Diet controlled gestational diabetes mellitus (GDM) in second trimester     Postprandial BGs well controlled on diet however fasting remains elevated  She is scheduled to see Diabetic Ed tomorrow to discuss medical management       Lab Results   Component Value Date    HGBA1C 5 4 04/16/2021               Other    Supervision of high risk pregnancy, antepartum - Primary     Denies OB complaints  Not yet aware of fetal movement  Denies contractions, cramping, leakage of fluid or vaginal bleeding  SS part 2 and L2 are scheduled  S/p flu vaccine  Reviewed reasons to call                Urine dip with neg protein, neg glucose

## 2021-05-05 NOTE — ASSESSMENT & PLAN NOTE
Postprandial BGs well controlled on diet however fasting remains elevated   She is scheduled to see Diabetic Ed tomorrow to discuss medical management       Lab Results   Component Value Date    HGBA1C 5 4 04/16/2021

## 2021-05-06 ENCOUNTER — TELEMEDICINE (OUTPATIENT)
Dept: PERINATAL CARE | Facility: CLINIC | Age: 31
End: 2021-05-06
Payer: COMMERCIAL

## 2021-05-06 VITALS — WEIGHT: 224 LBS | HEIGHT: 63 IN | BODY MASS INDEX: 39.69 KG/M2

## 2021-05-06 DIAGNOSIS — O99.210 MATERNAL MORBID OBESITY, ANTEPARTUM (HCC): ICD-10-CM

## 2021-05-06 DIAGNOSIS — E66.01 MATERNAL MORBID OBESITY, ANTEPARTUM (HCC): ICD-10-CM

## 2021-05-06 DIAGNOSIS — O24.414 INSULIN CONTROLLED GESTATIONAL DIABETES MELLITUS (GDM) IN SECOND TRIMESTER: Primary | ICD-10-CM

## 2021-05-06 PROBLEM — O24.419 GESTATIONAL DIABETES MELLITUS (GDM) IN SECOND TRIMESTER: Status: ACTIVE | Noted: 2021-05-05

## 2021-05-06 PROCEDURE — 99214 OFFICE O/P EST MOD 30 MIN: CPT | Performed by: NURSE PRACTITIONER

## 2021-05-06 RX ORDER — INSULIN GLARGINE 100 [IU]/ML
20 INJECTION, SOLUTION SUBCUTANEOUS
Qty: 15 ML | Refills: 0 | Status: SHIPPED | OUTPATIENT
Start: 2021-05-06 | End: 2021-07-21

## 2021-05-06 NOTE — PROGRESS NOTES
Virtual Regular Visit      Assessment/Plan:    Problem List Items Addressed This Visit        Endocrine    Gestational diabetes mellitus (GDM) in second trimester - Primary     -Due to FBS>90, start Lantus 20 units at 9 PM daily   -Add 3 AM glucose check for next 3 days   -Be sure to have bedtime snack with at least 15 grams of carbohydrates and 2 servings of protein    -Continue GDM diet and SMBG  -Report on Monday, 5/10/21 or sooner with issues   -Always have glucose available to treat hypoglycemia, use 15 by 15 rule  -Continue follow up with OB and MFM as recommended  Lab Results   Component Value Date    HGBA1C 5 4 04/16/2021            Relevant Medications    insulin glargine (Lantus SoloStar) 100 units/mL injection pen    Insulin Pen Needle 31G X 5 MM MISC       Other    BMI 40 0-44 9, adult (HCC)    Relevant Medications    insulin glargine (Lantus SoloStar) 100 units/mL injection pen    Insulin Pen Needle 31G X 5 MM MISC    Maternal morbid obesity, antepartum ([address] )     -Pre-pregnancy weight 249 lbs  -Current weight 224 lbs  -Current BMI 39 68   -Recommended weight gain during pregnancy 11 to 20 lbs  -Continue GDM diet  Relevant Medications    insulin glargine (Lantus SoloStar) 100 units/mL injection pen    Insulin Pen Needle 31G X 5 MM MISC            -After Level II ultrasound, fetal growth every 4 weeks or as recommended    -Starting at 32 weeks gestation NST twice a week or as recommended   -Continue close contact with diabetes team    -Via [employer] patient education: Lantus quick reference sheet, sites of injection and hypoglycemia  -Encouraged to watch basaglar insulin pen education video online       Reason for visit is   Chief Complaint   Patient presents with    Virtual Regular Visit    Gestational Diabetes        Encounter provider Cris Cohn    Provider located at [address] [place] [id number]  [phone]      Recent Visits  Date Type Provider Dept   21 Telephone Rosario Bundy, [address]    21 Telemedicine Analia  [facility]   Showing recent visits within past 7 days and meeting all other requirements     Today's Visits  Date Type Provider Dept   21 Telemedicine Antionette Arabella, [facility],[address] [facility]   Showing today's visits and meeting all other requirements     Future Appointments  No visits were found meeting these conditions  Showing future appointments within next 150 days and meeting all other requirements        The patient was identified by name and date of birth  Peter Reeves was informed that this is a telemedicine visit and that the visit is being conducted through [facility] and patient was informed that this is a secure, HIPAA-compliant platform  She agrees to proceed  My office door was closed  No one else was in the room  She acknowledged consent and understanding of privacy and security of the video platform  The patient has agreed to participate and understands they can discontinue the visit at any time  Patient is aware this is a billable service  Subjective  Peter Reeves is a 32 y o  female  16 2/7 weeks gestation GDM  Following GDM diet and SMBG  Glucose readings within normal except for fasting  Past Medical History:   Diagnosis Date    Varicella     vacc       Past Surgical History:   Procedure Laterality Date    [place]  DELIVERY ONLY N/A 11/15/2018    Procedure:  SECTION (); Surgeon: Sharyn Ingram DO;  [facility]: [facility] ;  Service: Obstetrics    WISDOM TOOTH EXTRACTION             Current Outpatient Medications   Medication Sig Dispense Refill    Blood Glucose Monitoring Suppl (OneTouch Verio Flex System) w/Device KIT Dispense 1 kit per insurance formulary   1 kit 0    MAGNESIUM SULFATE PO Take by mouth      OneTouch Delica Lancets 96E MISC Use 4 a day or as directed  100 each 3    OneTouch Verio test strip Test 4 times a day and as instructed  Gestational diabetes  100 each 3    Prenatal Vit-Fe Fumarate-FA (PRENATAL COMPLETE PO) Take by mouth      acetaminophen (TYLENOL) 325 mg tablet Take 2 tablets (650 mg total) by mouth every 4 (four) hours as needed for mild pain (Patient not taking: Reported on 4/14/2021) 30 tablet 0    insulin glargine (Lantus SoloStar) 100 units/mL injection pen Inject 20 Units under the skin daily at bedtime At 9 PM daily  To be titrated  15 mL 0    Insulin Pen Needle 31G X 5 MM MISC Inject under the skin daily at bedtime Use one a day or as directed  100 each 1     No current facility-administered medications for this visit  No Known Allergies    Review of Systems   Endocrine: Negative for polydipsia, polyphagia and polyuria  Video Exam    Vitals:    05/06/21 1717   Weight: 102 kg (224 lb)   Height: 5' 3" (1 6 m)       Physical Exam   It was my intent to perform this visit via video technology but the patient was not able to do a video connection so the visit was completed via audio telephone only  I spent 40 minutes directly with the patient during this visit  VIRTUAL VISIT DISCLAIMER    Momo De Los Santos acknowledges that she has consented to an online visit or consultation  She understands that the online visit is based solely on information provided by her, and that, in the absence of a face-to-face physical evaluation by the physician, the diagnosis she receives is both limited and provisional in terms of accuracy and completeness  This is not intended to replace a full medical face-to-face evaluation by the physician  Momo De Los Santos understands and accepts these terms

## 2021-05-06 NOTE — ASSESSMENT & PLAN NOTE
-Pre-pregnancy weight 249 lbs  -Current weight 224 lbs  -Current BMI 39 68   -Recommended weight gain during pregnancy 11 to 20 lbs  -Continue GDM diet

## 2021-05-06 NOTE — ASSESSMENT & PLAN NOTE
-Due to FBS>90, start Lantus 20 units at 9 PM daily   -Add 3 AM glucose check for next 3 days   -Be sure to have bedtime snack with at least 15 grams of carbohydrates and 2 servings of protein    -Continue GDM diet and SMBG  -Report on Monday, 5/10/21 or sooner with issues   -Always have glucose available to treat hypoglycemia, use 15 by 15 rule  -Continue follow up with OB and MFM as recommended    Lab Results   Component Value Date    HGBA1C 5 4 04/16/2021

## 2021-05-06 NOTE — PATIENT INSTRUCTIONS
-Due to FBS>90, start Lantus 20 units at 9 PM daily   -Add 3 AM glucose check for next 3 days   -Be sure to have bedtime snack with at least 15 grams of carbohydrates and 2 servings of protein    -Continue GDM diet and SMBG  -Report on Monday, 5/10/21 or sooner with issues   -Always have glucose available to treat hypoglycemia, use 15 by 15 rule     -Continue follow up with OB and MFM as recommended   -After Level II ultrasound, fetal growth every 4 weeks or as recommended    -Starting at 32 weeks gestation NST twice a week or as recommended   -Continue close contact with diabetes team

## 2021-05-07 ENCOUNTER — TELEPHONE (OUTPATIENT)
Dept: PERINATAL CARE | Facility: CLINIC | Age: 31
End: 2021-05-07

## 2021-05-07 NOTE — TELEPHONE ENCOUNTER
Patient called office today concerned regarding her starting insulin dose of 20 units of lantus  She stated she spoke with her pharmacist and felt that the starting does was too high  Explained to patient how we calculate starting doses and that we start off on conservative dose  She also was concerned that she would go too low overnight, encouraged her to check blood sugar at 3 am for at least 3 mornings following starting the insulin to monitor for hypoglycemia  Ensured patient she is less likely to go to low overnight with basal insulins and that they are released slowly over an extended period of time  Also encouraged to have a balanced bedtime snack prior to injecting the insulin  She still would like to start on the lowest dose possible, therefore agreeable to starting 10 units at 9 or 10 pm daily  She will contact office on Monday to reassess and titrate insulin as needed

## 2021-05-10 ENCOUNTER — DOCUMENTATION (OUTPATIENT)
Dept: PERINATAL CARE | Facility: CLINIC | Age: 31
End: 2021-05-10

## 2021-05-10 NOTE — PROGRESS NOTES
Date:  05/10/21  RE: Leonarda Eldridge    : 1990  Estimated Date of Delivery: 10/19/21  EGA: 16w6d  OB/GYN: Raina Randolph  Diet controlled gestational daibetes         3AM       94        99  5/10   93          Reports on Glucose Flow Sheet; discussed at Modified Class 1 today    Current regimen:  Lantus- was supposed to start on 20 units at 9 or 10 pm daily  Called office on  concerned about starting dose, and requested ot start on lowest dose possible, agreed to start 10 units at bedtime  3 am check WDL  2200 calories GDM diet with 3 meals & 3 snacks  Stated she tried many different food at bedtime snack & is not able to lower FBS  Self-Blood Glucose monitoring 4 times daily with a One-Touch Verio glucose meter  Reports she has 10 5 hours between bedtime snack & FBS Agreed to eat up to one hour later for her bedtime snack  Currently walks  Plan:  Lantus- increase from 10 to 14 units at 9 or 10 pm daily and continue to titrate  Will request patient to update flowsheet again by thursday or Friday morning this week to titrate insulin prior to weekend again  Continue current regimen  Continue bedtime snack of 1 CHO serving (15 gms) & increase to 2-3 oz protein  Diabetes Self Management Support Plan outside of ongoing care: Spouse/Family    Date due to report next:  Tuesday, 21or Friday, 21 to titrate medicaiton      Denise Lea RD, LDN  Diabetes Educator   [facility]

## 2021-05-13 ENCOUNTER — DOCUMENTATION (OUTPATIENT)
Dept: PERINATAL CARE | Facility: CLINIC | Age: 31
End: 2021-05-13

## 2021-05-13 ENCOUNTER — PATIENT MESSAGE (OUTPATIENT)
Dept: PERINATAL CARE | Facility: CLINIC | Age: 31
End: 2021-05-13

## 2021-05-13 NOTE — PROGRESS NOTES
Date:  21  RE: Army Osborne    : 1990  Estimated Date of Delivery: 10/19/21  EGA: 17w2d  OB/GYN: Joey Caballero  Insulin controlled gestational daibetes           Reports on Glucose Flow Sheet; discussed at Modified Class 1 today    Current regimen:  Lantus-14 units at 9 PM  2200 calories GDM diet with 3 meals & 3 snacks  Has changed bedtime snack to 1 CHO serving (15 gms) & 2-3 oz protein  Self-Blood Glucose monitoring 4 times daily with a One-Touch Verio glucose meter  Reports she has 10 5 hours between bedtime snack & FBS Agreed to eat up to one hour later for her bedtime snack  Currently walks  Plan:  Lantus- continue above dose  Continue diet & monitoring  20 week ultrasound scheduled for 21 GvT4a-9 4%   Reorder week of 21    Diabetes Self Management Support Plan outside of ongoing care: Spouse/Family    Date due to report next:  Thursday, 21    Noe Suh, MS,RD,CDE  Diabetes Educator   [facility]

## 2021-05-17 LAB
# FETUSES US: 1
AFP ADJ MOM SERPL: 0.83
AFP SERPL-MCNC: 23 NG/ML
B-HCG ADJ MOM SERPL: 0.9
B-HCG SERPL-ACNC: 20.9 IU/ML
COLLECT DATE: NORMAL
CURRENT SMOKER: NO
FET CRL US.MEAS: 70 MM
FET NUCHAL FOLD MOM THICKNESS US.MEAS: 0.92
FET NUCHAL FOLD THICKNESS US.MEAS: 1.5 MM
FET TS 21 RISK FROM MAT AGE: NORMAL
GA CLIN EST: 16.7 WK
HX OF NTD NARR: NORMAL
IDDM PATIENT QL: NORMAL
INHIBIN A ADJ MOM SERPL: 0.92
INHIBIN A SERPL-MCNC: 120 PG/ML
INTEGRATED SCN PATIENT-IMP: NORMAL
NEURAL TUBE DEFECT RISK FETUS: NORMAL %
PAPP-A MOM SERPL: 1.1
PAPP-A SERPL-MCNC: 666.4 NG/ML
PHYSICIAN NPI: NORMAL
SL AMB NASAL BONE: PRESENT
SL AMB REFERRING PHYSICIAN NAME: NORMAL
SL AMB REFERRING PHYSICIAN PHONE: NORMAL
SL AMB REPEAT SPECIMEN: NORMAL
SL AMB SPECIMEN # FROM PART 1: NORMAL
SL AMB TWIN B NASAL BONE: NORMAL
TS 18 RISK FETUS: NORMAL
TS 21 RISK FETUS: NORMAL
U ESTRIOL ADJ MOM SERPL: 1.22
U ESTRIOL SERPL-MCNC: 0.91 NG/ML
US DATE: NORMAL

## 2021-05-19 ENCOUNTER — TELEPHONE (OUTPATIENT)
Dept: PERINATAL CARE | Facility: OTHER | Age: 31
End: 2021-05-19

## 2021-05-19 NOTE — TELEPHONE ENCOUNTER
Called patient and provided her with part 2 sequential screen results  She offers no questions or concerns at this time

## 2021-05-19 NOTE — TELEPHONE ENCOUNTER
----- Message from Chrissy Garcia MD sent at 2021  9:18 AM EDT -----  Hi  RN staff, I've reviewed this Sequential Part 2 result which shows low residual risk for the conditions tested (trisomies 21 and 18 and open neural tube defects), can you call her to inform her of this result? Thank you    Chrissy Garcia MD

## 2021-05-21 ENCOUNTER — DOCUMENTATION (OUTPATIENT)
Dept: PERINATAL CARE | Facility: CLINIC | Age: 31
End: 2021-05-21

## 2021-05-21 NOTE — PROGRESS NOTES
Date:  21  RE: Karen Washburn    : 1990  Estimated Date of Delivery: 10/19/21  EGA: 18w3d  OB/GYN: Soledad Gutierrez  Insulin controlled gestational diabetes           Reports on Glucose Flow Sheet; discussed at Modified Class 1 today    Current regimen:  Lantus-14 units at 9 PM  2200 calories GDM diet with 3 meals & 3 snacks  Has changed bedtime snack to 1 CHO serving (15 gms) & 2-3 oz protein  Self-Blood Glucose monitoring 4 times daily with a One-Touch Verio glucose meter  Reports she has 10 5 hours between bedtime snack & FBS Agreed to eat up to one hour later for her bedtime snack  Currently walks  Plan:  Lantus- continue above dose  Continue diet & monitoring  20 week ultrasound scheduled for 21 [id number]-1 4%   Reorder week of 21    Diabetes Self Management Support Plan outside of ongoing care: Spouse/Family    Date due to report next:  Thursday, 21    Marysol Carcamo RD, LDN  Diabetes Educator   [facility]regnancy [facility]

## 2021-05-27 ENCOUNTER — DOCUMENTATION (OUTPATIENT)
Dept: PERINATAL CARE | Facility: CLINIC | Age: 31
End: 2021-05-27

## 2021-05-27 NOTE — PROGRESS NOTES
Date:  21  RE: Mariela Hadley    : 1990  Estimated Date of Delivery: 10/19/21  EGA: 19w2d  OB/GYN: Luiz Ramos  Insulin controlled gestational diabetes             Reports on Glucose Flow Sheet    Current regimen:  Lantus-14 units at 9 PM  2200 calories GDM diet with 3 meals & 3 snacks  Has changed bedtime snack to 1 CHO serving (15 gms) & 2-3 oz protein  Self-Blood Glucose monitoring 4 times daily with a One-Touch Verio glucose meter  Reports she has 10 5 hours between bedtime snack & FBS Agreed to eat up to one hour later for her bedtime snack  Currently walks  Plan:  Lantus- continue above dose  Continue diet & monitoring  20 week ultrasound scheduled for 21 GqX2l-5 4%   Reorder week of 21    Diabetes Self Management Support Plan outside of ongoing care: Spouse/Family    Date due to report next:  Thursday, 6/3/21    Cullen Cruz, MS, RD, CDE  Diabetes Educator   [facility]regnancy [facility]

## 2021-06-03 ENCOUNTER — DOCUMENTATION (OUTPATIENT)
Dept: PERINATAL CARE | Facility: CLINIC | Age: 31
End: 2021-06-03

## 2021-06-03 NOTE — PATIENT INSTRUCTIONS
Pregnancy at 23 to 22 [address]:   What changes are happening in my body? Now that you are in your second trimester, you have more energy  You may also be feeling hungrier than usual  You may be gaining about ½ to 1 pound a week, and your pregnancy is beginning to show  You may need to start wearing maternity clothes  As your baby gets larger, you may have other symptoms  These may include body aches or stretch marks on your abdomen, breasts, thighs, or buttocks  How do I care for myself at this stage of my pregnancy? · Eat a variety of healthy foods  Healthy foods include fruits, vegetables, whole-grain breads, low-fat dairy foods, beans, lean meats, and fish  Drink liquids as directed  Ask how much liquid to drink each day and which liquids are best for you  Limit caffeine to less than 200 milligrams each day  Limit your intake of fish to 2 servings each week  Choose fish low in mercury such as canned light tuna, shrimp, salmon, cod, or tilapia  Do not  eat fish high in mercury such as swordfish, tilefish, sisi mackerel, and shark  · Take prenatal vitamins as directed  Your need for certain vitamins and minerals, such as folic acid, increases during pregnancy  Prenatal vitamins provide some of the extra vitamins and minerals you need  Prenatal vitamins may also help to decrease the risk of certain birth defects  · Talk to your healthcare provider about exercise  Moderate exercise can help you stay fit  Your healthcare provider will help you plan an exercise program that is safe for you during pregnancy  · Do not smoke  Smoking increases your risk of a miscarriage and other health problems during your pregnancy  Smoking can cause your baby to be born too early or weigh less at birth  Ask your healthcare provider for information if you need help quitting  · Do not drink alcohol  Alcohol passes from your body to your baby through the placenta   It can affect your baby's brain development and cause fetal alcohol syndrome (FAS)  FAS is a group of conditions that causes mental, behavior, and growth problems  · Talk to your healthcare provider before you take any medicines  Many medicines may harm your baby if you take them when you are pregnant  Do not take any medicines, vitamins, herbs, or supplements without first talking to your healthcare provider  Never use illegal or street drugs (such as marijuana or cocaine) while you are pregnant  What are some safety tips during pregnancy? · Avoid hot tubs and saunas  Do not use a hot tub or sauna while you are pregnant, especially during your first trimester  Hot tubs and saunas may raise your baby's temperature and increase the risk of birth defects  · Avoid toxoplasmosis  This is an infection caused by eating raw meat or being around infected cat feces  It can cause birth defects, miscarriages, and other problems  Wash your hands after you touch raw meat  Make sure any meat is well-cooked before you eat it  Avoid raw eggs and unpasteurized milk  Use gloves or ask someone else to clean your cat's litter box while you are pregnant  What changes are happening with my baby? By 22 weeks, your baby is about 8 inches long from the top of the head to the rump (baby's bottom)  Your baby also weighs about 1 pound  Your baby is becoming much more active  You may be able to feel the baby move inside you now  The first movements may not be that noticeable  They may feel like a fluttering sensation  As time goes on, your baby's movements will become stronger and more noticeable  What do I need to know about prenatal care? During the first 28 weeks of your pregnancy, you will see your healthcare provider once a month  Your healthcare provider will check your blood pressure and weight  You may also need the following:  · A urine test  may also be done to check for sugar and protein   These can be signs of gestational diabetes or infection  Protein in your urine may also be a sign of preeclampsia  Preeclampsia is a condition that can develop during week 20 or later of your pregnancy  It causes high blood pressure, and it can cause problems with your kidneys and other organs  · Fundal height  is a measurement of your uterus to check your baby's growth  This number is usually the same as the number of weeks that you have been pregnant  · A fetal ultrasound  shows pictures of your baby inside your uterus  It shows your baby's development  The movement and position of your baby can also be seen  Your healthcare provider may be able to tell you what your baby's gender is during the ultrasound  · Your baby's heart rate  will be checked  When should I seek immediate care? · You develop a severe headache that does not go away  · You have new or increased vision changes, such as blurred or spotted vision  · You have new or increased swelling in your face or hands  · You have vaginal spotting or bleeding  · Your water broke or you feel warm water gushing or trickling from your vagina  When should I contact my healthcare provider? · You have abdominal cramps, pressure, or tightening  · You have a change in vaginal discharge  · You cannot keep food or drinks down, and you are losing weight  · You have chills or a fever  · You have vaginal itching, burning, or pain  · You have yellow, green, white, or foul-smelling vaginal discharge  · You have pain or burning when you urinate, less urine than usual, or pink or bloody urine  · You have questions or concerns about your condition or care  CARE AGREEMENT:   You have the right to help plan your care  Learn about your health condition and how it may be treated  Discuss treatment options with your healthcare providers to decide what care you want to receive  You always have the right to refuse treatment   The above information is an  only  It is not intended as medical advice for individual conditions or treatments  Talk to your doctor, nurse or pharmacist before following any medical regimen to see if it is safe and effective for you  © Copyright [address] Information is for End User's use only and may not be sold, redistributed or otherwise used for commercial purposes   All illustrations and images included in CareNotes® are the copyrighted property of A D A M , Inc  or [address] [place] [address]

## 2021-06-03 NOTE — PROGRESS NOTES
Date:  21  RE: Anabelle Sy    : 1990  Estimated Date of Delivery: 10/19/21  EGA: 20w2d  OB/GYN: Raúl Garza  Insulin controlled gestational diabetes         Assessment and plan  Increase Lantus to 16 units   5/10/2021 -Increased Lantus-14 units at 9 PM     2200 calories GDM diet with 3 meals & 3 snacks  Bedtime snack to 1 CHO serving (15 gms) & 2-3 oz protein  Self-Blood Glucose monitoring 4 times daily with a One-Touch Verio glucose meter  Labs  21 AtK6t-9 4%     Next A1c due by 2021, encourage to complete     Ultrasounds  2021 Normal growth and fluid   Next US scheduled for 2021    Testing  Starting at 32 weeks, NST/BRUNA twice weekly  Júnior , RN

## 2021-06-04 ENCOUNTER — ROUTINE PRENATAL (OUTPATIENT)
Dept: PERINATAL CARE | Facility: OTHER | Age: 31
End: 2021-06-04
Payer: COMMERCIAL

## 2021-06-04 ENCOUNTER — ROUTINE PRENATAL (OUTPATIENT)
Dept: OBGYN CLINIC | Facility: CLINIC | Age: 31
End: 2021-06-04

## 2021-06-04 VITALS
SYSTOLIC BLOOD PRESSURE: 110 MMHG | BODY MASS INDEX: 42.98 KG/M2 | WEIGHT: 242.6 LBS | DIASTOLIC BLOOD PRESSURE: 84 MMHG | HEIGHT: 63 IN

## 2021-06-04 VITALS
BODY MASS INDEX: 42.91 KG/M2 | HEART RATE: 97 BPM | DIASTOLIC BLOOD PRESSURE: 83 MMHG | SYSTOLIC BLOOD PRESSURE: 126 MMHG | HEIGHT: 63 IN | WEIGHT: 242.2 LBS

## 2021-06-04 DIAGNOSIS — O34.219 PREGNANCY WITH HISTORY OF CESAREAN SECTION, ANTEPARTUM: ICD-10-CM

## 2021-06-04 DIAGNOSIS — E66.01 MATERNAL MORBID OBESITY, ANTEPARTUM (HCC): ICD-10-CM

## 2021-06-04 DIAGNOSIS — O99.210 MATERNAL MORBID OBESITY, ANTEPARTUM (HCC): ICD-10-CM

## 2021-06-04 DIAGNOSIS — Z34.02 ENCOUNTER FOR SUPERVISION OF NORMAL FIRST PREGNANCY, SECOND TRIMESTER: Primary | ICD-10-CM

## 2021-06-04 DIAGNOSIS — O34.219 HISTORY OF CESAREAN DELIVERY, ANTEPARTUM: ICD-10-CM

## 2021-06-04 DIAGNOSIS — O24.414 INSULIN CONTROLLED GESTATIONAL DIABETES MELLITUS (GDM) IN SECOND TRIMESTER: ICD-10-CM

## 2021-06-04 DIAGNOSIS — O24.414 INSULIN CONTROLLED GESTATIONAL DIABETES MELLITUS (GDM) IN SECOND TRIMESTER: Primary | ICD-10-CM

## 2021-06-04 DIAGNOSIS — O09.299 PRIOR FETAL MACROSOMIA, ANTEPARTUM: ICD-10-CM

## 2021-06-04 LAB
SL AMB  POCT GLUCOSE, UA: NEGATIVE
SL AMB POCT URINE PROTEIN: NEGATIVE

## 2021-06-04 PROCEDURE — 76811 OB US DETAILED SNGL FETUS: CPT | Performed by: OBSTETRICS & GYNECOLOGY

## 2021-06-04 PROCEDURE — 76817 TRANSVAGINAL US OBSTETRIC: CPT | Performed by: OBSTETRICS & GYNECOLOGY

## 2021-06-04 PROCEDURE — PNV: Performed by: NURSE PRACTITIONER

## 2021-06-04 PROCEDURE — 99214 OFFICE O/P EST MOD 30 MIN: CPT | Performed by: OBSTETRICS & GYNECOLOGY

## 2021-06-04 NOTE — ASSESSMENT & PLAN NOTE
She desires repeat C/S, still needs to meet with MD in our group  Reviewed scheduling appt with them for next visits

## 2021-06-04 NOTE — ASSESSMENT & PLAN NOTE
Return OB  Denies LOF, vag blding, ctxs, cramping and reports good FM " Flutters"  Taking PNV daily as prescribed  Denies any problems today  Has Level II appt after this visit  They do not want to know the gender  Reviewed SAB precautions

## 2021-06-04 NOTE — PROGRESS NOTES
[address]: Ms Sharda Griggs was seen today at 20w3d for anatomic survey and cervical length screening ultrasound  See ultrasound report under "OB Procedures" tab  Please don't hesitate to contact our office with any concerns or questions    Nigel Mcneil MD

## 2021-06-04 NOTE — PROGRESS NOTES
1  Encounter for supervision of normal first pregnancy, second trimester  POCT urine dip   2  Insulin controlled gestational diabetes mellitus (GDM) in second trimester     3  Maternal morbid obesity, antepartum ([address] )     4  History of  delivery, antepartum     5  Prior fetal macrosomia, antepartum     6  Pregnancy with history of  section, antepartum       Insulin controlled gestational diabetes mellitus (GDM) in second trimester    Lab Results   Component Value Date    HGBA1C 5 4 2021       Reviewed with pt to continue with weekly reporting DP  Started on insulin at night, but still having a few elevated BS  Encounter for supervision of normal first pregnancy, second trimester  Return OB  Denies LOF, vag blding, ctxs, cramping and reports good FM " Flutters"  Taking PNV daily as prescribed  Denies any problems today  Has Level II appt after this visit  They do not want to know the gender  Reviewed SAB precautions  History of  delivery, antepartum  She desires repeat C/S, still needs to meet with MD in our group  Reviewed scheduling appt with them for next visits

## 2021-06-04 NOTE — PROGRESS NOTES
PNV 20w3d    Patient denies any lof, vb or ctx  Patient stated she started feeling little flutters about 3 days ago  Patient urine is negative for glucose and protein  Patient has no concerns today

## 2021-06-04 NOTE — PATIENT INSTRUCTIONS
Thank you for choosing us for your  care today  If you have any questions about your ultrasound or care, please do not hesitate to contact us or your primary obstetrician  Some general instructions for your pregnancy are:     Protect against coronavirus: Continue to practice social distancing, wear a mask, and wash your hands often  Pregnant women are increased risk of severe COVID  Notify your primary care doctor if you have any symptoms including cough, shortness of breath or difficulty breathing, fever, chills, muscle pain, sore throat, or loss of taste or smell  Pregnant women can receive the coronavirus vaccine   Exercise: Aim for 22 minutes per day (150 minutes per week) of regular exercise  Walking is great!  Nutrition: aim for calcium-rich and iron-rich foods as well as healthy sources of protein   Protect against the flu: get yourself and your entire household vaccinated against influenza  This will protect your baby   Learn about Preeclampsia: preeclampsia is a common, serious high blood pressure complication in pregnancy  A blood pressure of 607HXLI (systolic or top number) or 64ADTB (diastolic or bottom number) is not normal and needs evaluation by your doctor   If you smoke, try to reduce how many cigarettes you smoke or try to quit completely  Do not vape   Other warning signs to watch out for in pregnancy or postpartum: chest pain, obstructed breathing or shortness of breath, seizures, thoughts of hurting yourself or your baby, bleeding, a painful or swollen leg, fever, or headache (see [facility] POST-BIRTH Warning Signs campaign)  If these happen call 911  Itching is also not normal in pregnancy and if you experience this, especially over your hands and feet, potentially worse at night, notify your doctors     Lastly, if you are contacted regarding participation in a survey about your experience in our office, please know that we take any feedback you provide seriously and use it to improve how we deliver care through our center

## 2021-06-04 NOTE — ASSESSMENT & PLAN NOTE
Lab Results   Component Value Date    HGBA1C 5 4 04/16/2021       Reviewed with pt to continue with weekly reporting DP  Started on insulin at night, but still having a few elevated BS

## 2021-06-08 ENCOUNTER — DOCUMENTATION (OUTPATIENT)
Dept: PERINATAL CARE | Facility: CLINIC | Age: 31
End: 2021-06-08

## 2021-06-08 NOTE — PROGRESS NOTES
Date:  21  RE: Jo Davis    : 1990  Estimated Date of Delivery: 10/19/21  EGA: 21w0d  OB/GYN: Erline Opitz  Insulin controlled gestational diabetes  BMI: 42 90          Assessment and plan  Maintain Lantus to 16 units   6/3/2021 -Increased Lantus-16 units at 9 PM     2200 calories GDM diet with 3 meals & 3 snacks  Bedtime snack to 1 CHO serving (15 gms) & 2-3 oz protein  Self-Blood Glucose monitoring 4 times daily with a One-Touch Verio glucose meter  Labs  21 RcV2z-1 4%     Next A1c due by 2021, encourage to complete     Ultrasounds  2021 Normal growth and fluid   Next US scheduled for 2021    Testing  Starting at 32 weeks, NST/BRUNA twice weekly  Christen Muir RN

## 2021-06-10 ENCOUNTER — APPOINTMENT (OUTPATIENT)
Dept: LAB | Facility: HOSPITAL | Age: 31
End: 2021-06-10
Payer: COMMERCIAL

## 2021-06-16 ENCOUNTER — DOCUMENTATION (OUTPATIENT)
Dept: PERINATAL CARE | Facility: CLINIC | Age: 31
End: 2021-06-16

## 2021-06-16 NOTE — PROGRESS NOTES
Date:  21  RE: Marly Early    : 1990  Estimated Date of Delivery: 10/19/21  EGA: 22w1d  OB/GYN: Basilio Hagan  Insulin controlled gestational diabetes  BMI: 42 90        Assessment and plan  Increase Lantus to 18 units   2021 -Increased Lantus-16 units at 9 PM     2200 calories GDM diet with 3 meals & 3 snacks  Bedtime snack to 1 CHO serving (15 gms) & 2-3 oz protein  Self-Blood Glucose monitoring 4 times daily with a One-Touch Verio glucose meter      Labs  21 MgO3k-4 4%    06/10/2021 A1c = 5 2%  Next A1c due by 2021    Ultrasounds  2021 Normal growth and fluid   Next US scheduled for 2021    Testing  Starting at 32 weeks, NST/BRUNA twice weekly  Teresa Bess RN

## 2021-06-23 ENCOUNTER — DOCUMENTATION (OUTPATIENT)
Dept: PERINATAL CARE | Facility: CLINIC | Age: 31
End: 2021-06-23

## 2021-06-23 NOTE — PROGRESS NOTES
Date:  21  RE: Hilda Macias    : 1990  Estimated Date of Delivery: 10/19/21  EGA: 23w1d  OB/GYN: Silvia Peter  Insulin controlled gestational diabetes  BMI: 42 90        Assessment and plan  Increase Lantus to 20 units   2021 -Increased Lantus-18 units at 9 PM    -Monitor post meal blood sugars  2200 calories GDM diet with 3 meals & 3 snacks  Bedtime snack to 1 CHO serving (15 gms) & 2-3 oz protein  Self-Blood Glucose monitoring 4 times daily with a One-Touch Verio glucose meter      Labs  21 HdY1b-2 4%    06/10/2021 A1c = 5 2%  Next A1c due by 2021    Ultrasounds  2021 Normal growth and fluid   Next US scheduled for 2021    Testing  Starting at 32 weeks, NST/BRUNA twice weekly  Baljit White RN

## 2021-06-28 NOTE — PROGRESS NOTES
Patient's Lab: [facility]          P: 1  Blood Type: O+  Glucose Results: 1 hr GTT abnormal, 6/10 A1C normal  Labs Up-to-date: Print -w  Folders Given: Yellow next visit  LOF, VB: No  Contractions: No  FM: Good  Gender: Surprise  S/P Injections/Vaccines: Flu  TWG: -7lbs 6 2oz  Urine Today: Trace/-    HX: Of C/S due to failure to progress   Pt  Desires repeat C/S    Concerns: None

## 2021-06-30 ENCOUNTER — DOCUMENTATION (OUTPATIENT)
Dept: PERINATAL CARE | Facility: CLINIC | Age: 31
End: 2021-06-30

## 2021-06-30 DIAGNOSIS — O24.414 INSULIN CONTROLLED GESTATIONAL DIABETES MELLITUS (GDM) IN SECOND TRIMESTER: Primary | ICD-10-CM

## 2021-06-30 NOTE — PROGRESS NOTES
Date:  21  RE: Jorge Deluca    : 1990  Estimated Date of Delivery: 10/19/21  EGA: 24w1d  OB/GYN: Maral Aparicio  Insulin controlled gestational diabetes  BMI: 42 90          Assessment and plan  Lantus currently at 20 units   -Left voicemail @ 10:32 am to clarify fasting BS 41 when all other fasting BS are 90 or greater  Provided my office number to call me directly or send me a [employer] [place] [employer] [place] message     -Will wait for patient's call or message to clarify before adjusting basal insulin at this time     -Encouraged to make follow up appt with dietician per [employer]'s recommendations on 2021      2200 calories GDM diet with 3 meals & 3 snacks  Bedtime snack to 1 CHO serving (15 gms) & 2-3 oz protein  Self-Blood Glucose monitoring 4 times daily with a One-Touch Verio glucose meter      Labs  21 ZoN0h-8 4%    06/10/2021 A1c = 5 2%  Next A1c due by 2021    Ultrasounds  2021 Normal growth and fluid   Next US scheduled for 2021    Testing  Starting at 32 weeks, NST/BRUNA twice weekly  Tavia Peck RN

## 2021-06-30 NOTE — PROGRESS NOTES
Please refer to the [facility] ultrasound report in Ob Procedures for additional information regarding today's visit

## 2021-07-01 ENCOUNTER — ROUTINE PRENATAL (OUTPATIENT)
Dept: PERINATAL CARE | Facility: OTHER | Age: 31
End: 2021-07-01
Payer: COMMERCIAL

## 2021-07-01 VITALS
SYSTOLIC BLOOD PRESSURE: 117 MMHG | BODY MASS INDEX: 42.81 KG/M2 | HEART RATE: 96 BPM | HEIGHT: 63 IN | WEIGHT: 241.6 LBS | DIASTOLIC BLOOD PRESSURE: 88 MMHG

## 2021-07-01 DIAGNOSIS — O24.414 INSULIN CONTROLLED GESTATIONAL DIABETES MELLITUS (GDM) IN SECOND TRIMESTER: Primary | ICD-10-CM

## 2021-07-01 DIAGNOSIS — Z3A.24 24 WEEKS GESTATION OF PREGNANCY: ICD-10-CM

## 2021-07-01 PROCEDURE — 76827 ECHO EXAM OF FETAL HEART: CPT | Performed by: OBSTETRICS & GYNECOLOGY

## 2021-07-01 PROCEDURE — 93325 DOPPLER ECHO COLOR FLOW MAPG: CPT | Performed by: OBSTETRICS & GYNECOLOGY

## 2021-07-01 PROCEDURE — 99213 OFFICE O/P EST LOW 20 MIN: CPT | Performed by: OBSTETRICS & GYNECOLOGY

## 2021-07-01 PROCEDURE — 76825 ECHO EXAM OF FETAL HEART: CPT | Performed by: OBSTETRICS & GYNECOLOGY

## 2021-07-01 NOTE — LETTER
July 1, 2021     Kathleen Payton, [address]  [facility] [address]    Patient: Andria Cornell   YOB: 1990   Date of Visit: 7/1/2021       Dear Dr Silva Began: Thank you for referring Andria Cornell to me for evaluation  Below are my notes for this consultation  If you have questions, please do not hesitate to call me  I look forward to following your patient along with you  Sincerely,        Patsy Padilla MD        CC: No Recipients  Patsy Padilla MD  6/30/2021  6:33 PM  Sign when Signing Visit   Please refer to the [facility] ultrasound report in Ob Procedures for additional information regarding today's visit

## 2021-07-02 ENCOUNTER — ROUTINE PRENATAL (OUTPATIENT)
Dept: OBGYN CLINIC | Facility: MEDICAL CENTER | Age: 31
End: 2021-07-02

## 2021-07-02 VITALS
DIASTOLIC BLOOD PRESSURE: 80 MMHG | BODY MASS INDEX: 42.87 KG/M2 | HEART RATE: 80 BPM | SYSTOLIC BLOOD PRESSURE: 108 MMHG | WEIGHT: 242 LBS

## 2021-07-02 DIAGNOSIS — O99.210 MATERNAL MORBID OBESITY, ANTEPARTUM (HCC): ICD-10-CM

## 2021-07-02 DIAGNOSIS — O09.92 SUPERVISION OF HIGH RISK PREGNANCY, ANTEPARTUM, SECOND TRIMESTER: Primary | ICD-10-CM

## 2021-07-02 DIAGNOSIS — O09.299 PRIOR FETAL MACROSOMIA, ANTEPARTUM: ICD-10-CM

## 2021-07-02 DIAGNOSIS — O34.219 HISTORY OF CESAREAN DELIVERY, ANTEPARTUM: ICD-10-CM

## 2021-07-02 DIAGNOSIS — O24.414 INSULIN CONTROLLED GESTATIONAL DIABETES MELLITUS (GDM) IN SECOND TRIMESTER: ICD-10-CM

## 2021-07-02 DIAGNOSIS — E66.01 MATERNAL MORBID OBESITY, ANTEPARTUM (HCC): ICD-10-CM

## 2021-07-02 LAB
SL AMB  POCT GLUCOSE, UA: NEGATIVE
SL AMB POCT URINE PROTEIN: ABNORMAL

## 2021-07-02 PROCEDURE — PNV: Performed by: NURSE PRACTITIONER

## 2021-07-07 ENCOUNTER — TELEMEDICINE (OUTPATIENT)
Dept: PERINATAL CARE | Facility: CLINIC | Age: 31
End: 2021-07-07
Payer: COMMERCIAL

## 2021-07-07 DIAGNOSIS — Z3A.25 25 WEEKS GESTATION OF PREGNANCY: ICD-10-CM

## 2021-07-07 DIAGNOSIS — O24.414 INSULIN CONTROLLED GESTATIONAL DIABETES MELLITUS (GDM) IN SECOND TRIMESTER: Primary | ICD-10-CM

## 2021-07-07 PROCEDURE — G0108 DIAB MANAGE TRN  PER INDIV: HCPCS | Performed by: DIETITIAN, REGISTERED

## 2021-07-07 NOTE — PATIENT INSTRUCTIONS
1  Continue Meal Plan consisting of 3 meals and 3 snacks daily, including protein at each  2  Try to eat every 2-3 5 hours while awake  3  Do not exceed 8-10 hours fasting overnight  4  Continue self-monitoring blood glucose: 4 x per day (Fasting, 2 hour after start of each meal)  Goal: fasting glucose 90 mg/dL or less, and 2 hrs after the start of each meal 120 mg/dL or less (1 hr after the start of each meal 135 mg/dL or less)  5  Submit blood sugar values weekly via: Telephone  at [phone] or [employer] messaging or [employer] glucose flowsheet  6  If no restriction from physician, recommend up to 30 minutes walking daily  7  Report blood sugars on Tuesday, 7/13/21  8  Increase lantus to 22 units at 9 or 10 pm daily  9  Increase protein to 2-3 oz with 16 gm carbohydrate at bedtime snack  10  Add protein to breakfast when having regular waffles by either adding premier protein or fairlife core power protein shakes, or by switching to high protein pancake and waffles mixes such as kodiak cakes  11  Balanced snack ideas:  [place] yogurt (chobani, okios, yoplait YQ, etc), greek yogurt protein drinks (chobani, Light and Fit Protein), protein shakes (ex: glucerna hunger smart), sargento balanced breaks, protein granola bars (nature valley protein, kind protein, special K protein, [place] protein), cottage cheese doubles, P3 snack packs, enlightened ice cream bars, cheese and crackers, peanut butter and crackers, cottage cheese with fruit, tortilla chips with salsa and shredded cheese, hummus with raw vegetables, chicken salad, tuna salad or egg salad on 1 slice whole wheat bread (1/2 sandwich), whole wheat crackers or whole wheat joel  12  Avoid honey wheat bread and swap out for regular whole wheat bread for extra fiber and less sugar for sandwiches  13   Aim for no more than 1 cup portion of rice, pasta, or potato at sitting, reduce to 1/2 cup if having with starchy vegetables at the same time such as corn  14  Aim for minimum of 3 oz protein at all 3 meals   15   Can try zevia soda for zero sugar alternative to other diet beverages

## 2021-07-07 NOTE — PROGRESS NOTES
Date:  21  RE: Luis Carlos Rhodes    : 1990  Estimated Date of Delivery: 10/19/21  EGA: 25w1d  OB/GYN: Stacy Melendez  Insulin controlled gestational diabetes  BMI: 42 90        24 hr Diet Recall:  Breakfast(8:15 am): breakfast sandwich on [employer] english muffin, or honey wheat bread with 1 egg, 1 cheese and either one slice of pork roll or turkey sausage OR 2 waffles with sugar free syrup  AM Snack(10:30 am): jaden crackers, saltines, or Wheat thins with peanut butter or cheese OR apple with peanut butter   Lunch (12:30-1:30 pm): grilled ham and cheese sandwich on honey wheat bread (2 slices ham and 2 slices cheese)  PM Snack(3 pm): wheat thins with peanut butter or apple with peanut butter  Dinner(5-6:30): meat (chicken, pork chop, sausage, hot dogs) with vegetable (mixed veggies, broccoli, or sweet corn) and starch (small baked potato, 2 oz rice, 2 oz  occasionally pasta)  Bedtime Snack (9 pm): crackers with peanut butter  Beverages: water only, has tried diet beverages but gets headaches from artificial sweeteners      Assessment and plan  Lantus currently at 20 units- increase to 22 units at 9 or 10 pm daily  Continue 2200 calories GDM diet with 3 meals & 3 snacks  Change bedtime snack to 1 CHO serving (15 gms) & 2-3 oz protein  Examples provided  Recommendations for adding protein at breakfast meal when having waffles by themselves was provided  Also encouraged her to add extra oz protein at bedtime snack, which she was not consistently doing prior  Other variety of balanced snack ideas provided for when patient is on the go  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 so is unable to follow diet as good when camping  Suggested protein shakes or greek yogurt smoothie drinks which are easy for on the go as well  If have repeat meat that caused 2 hr PP elevation reduce carbohydrate by 1 serving and increase with extra oz of protein   Also advised to avoid honey wheat bread and to substitute for regular whole wheat bread or whole wheat english muffins for breakfast sandwiches  Be cautious of carbohydrate serving when having starchy vegetable such as corn at the same time  Patient understands and is agreeable to making the changes  Also recommended Zevia zero sugar soda for alternative, given that patient gets headaches from other sugar substitutes used in majority of diet beverages on the market  Self-Blood Glucose monitoring 4 times daily with a One-Touch Verio glucose meter  Encouraged physical activity daily as ok by OB  She is more physically active on days when she is camping and does a lot more walking  Labs  4/16/21 GlS2o-0 4%    06/10/2021 A1c = 5 2%  Next A1c due by August 5, 2021    Ultrasounds  07/01/2021 Normal Echo and BRUNA      Testing  Starting at 32 weeks, NST/BRUNA twice weekly    Juancho Maravilla RD, ALISSONN  Diabetes Educator  [facility]aternal [facility]etal [facility]  Diabetes in Pregnancy Program    Virtual Regular Visit      Assessment/Plan:    Problem List Items Addressed This Visit        Endocrine    Insulin controlled gestational diabetes mellitus (GDM) in second trimester - Primary      Other Visit Diagnoses     25 weeks gestation of pregnancy                   Reason for visit is   Chief Complaint   Patient presents with    Virtual Regular Visit        Encounter provider Juancho Maravilla    Provider located at [address] [place] [id number] [phone]      Recent Visits  No visits were found meeting these conditions  Showing recent visits within past 7 days and meeting all other requirements  Today's Visits  Date Type Provider Dept   07/07/21 Telemedicine [facility]   Showing today's visits and meeting all other requirements  Future Appointments  No visits were found meeting these conditions    Showing future appointments within next 150 days and meeting all other requirements       The patient was identified by name and date of birth  Alexander Wolf was informed that this is a telemedicine visit and that the visit is being conducted through [address] [place] [address] Now and patient was informed that this is a secure, HIPAA-compliant platform  She agrees to proceed     My office door was closed  No one else was in the room  She acknowledged consent and understanding of privacy and security of the video platform  The patient has agreed to participate and understands they can discontinue the visit at any time  Patient is aware this is a billable service  I spent 30 minutes directly with the patient during this visit      VIRTUAL VISIT DISCLAIMER    Alexander Wolf acknowledges that she has consented to an online visit or consultation  She understands that the online visit is based solely on information provided by her, and that, in the absence of a face-to-face physical evaluation by the physician, the diagnosis she receives is both limited and provisional in terms of accuracy and completeness  This is not intended to replace a full medical face-to-face evaluation by the physician  [place] understands and accepts these terms

## 2021-07-08 DIAGNOSIS — O24.419 GESTATIONAL DIABETES MELLITUS (GDM) IN SECOND TRIMESTER, GESTATIONAL DIABETES METHOD OF CONTROL UNSPECIFIED: ICD-10-CM

## 2021-07-08 RX ORDER — BLOOD SUGAR DIAGNOSTIC
STRIP MISCELLANEOUS
Qty: 100 STRIP | Refills: 3 | Status: SHIPPED | OUTPATIENT
Start: 2021-07-08 | End: 2021-10-16 | Stop reason: HOSPADM

## 2021-07-15 ENCOUNTER — DOCUMENTATION (OUTPATIENT)
Dept: PERINATAL CARE | Facility: CLINIC | Age: 31
End: 2021-07-15

## 2021-07-15 DIAGNOSIS — O24.414 INSULIN CONTROLLED GESTATIONAL DIABETES MELLITUS (GDM) IN SECOND TRIMESTER: Primary | ICD-10-CM

## 2021-07-15 NOTE — PROGRESS NOTES
Date:  07/15/21  RE: Andria Cornell    : 1990  Estimated Date of Delivery: 10/19/21  EGA: 26w2d  OB/GYN: Janis Lopez  Insulin controlled gestational diabetes  BMI: 42 90        Assessment and plan  Increase Lantus 22 units up to 24  Units  -Several post meal elevations, ranges     Diet: 2200 Gestational diabetes meal plan; 3 meals and 3 snacks     -Bedtime snack to 1 CHO serving (15 gms) & 2-3 oz protein  SMBG: Checks blood sugar 4 times per day; fasting and 2 hour start of each meal    Meter: One Touch Verio glucose meter  Activity: Walks 30 minutes daily, follow OB recommendations  Support System: Significant Other  Patient Goal: I will plan my meals and snacks every day       Labs  21 KiK6y-9 4%    06/10/2021 A1c = 5 2%  Next A1c due by 2021    Ultrasounds  2021 Normal growth and fluid   2021 Fetal ECHO completed - Normal   Next US scheduled for 2021    Further fetal surveillance  Beginning at 32 weeks, NST / BRUNA twice a week     Shellie Cerna RN

## 2021-07-22 ENCOUNTER — DOCUMENTATION (OUTPATIENT)
Dept: PERINATAL CARE | Facility: CLINIC | Age: 31
End: 2021-07-22

## 2021-07-22 NOTE — PROGRESS NOTES
Date:  21  RE: Ginette Serum    : 1990  Estimated Date of Delivery: 10/19/21  EGA: 27w2d  OB/GYN: Skip Bookbinder  Insulin controlled gestational diabetes      Assessment and plan  Continue Lantus 24  Units  -Several post meal elevations, ranges     Diet: 2200 Gestational diabetes meal plan; 3 meals and 3 snacks     -Bedtime snack to 1 CHO serving (15 gms) & 2-3 oz protein  SMBG: Checks blood sugar 4 times per day; fasting and 2 hour start of each meal    Meter: One Touch Verio glucose meter  Activity: Walks 30 minutes daily, follow OB recommendations  Support System: Significant Other  Patient Goal: I will plan my meals and snacks every day       Labs  21 GzM8u-6 4%    06/10/2021 A1c = 5 2%  Next A1c due by 2021    Ultrasounds  2021 Normal growth and fluid   2021 Fetal ECHO completed - Normal   Next US scheduled for 2021    Further fetal surveillance  Beginning at 32 weeks, NST / BRUNA twice a week     Rosita Hyman RN

## 2021-07-24 ENCOUNTER — APPOINTMENT (OUTPATIENT)
Dept: LAB | Facility: MEDICAL CENTER | Age: 31
End: 2021-07-24
Payer: COMMERCIAL

## 2021-07-24 DIAGNOSIS — O09.92 SUPERVISION OF HIGH RISK PREGNANCY, ANTEPARTUM, SECOND TRIMESTER: ICD-10-CM

## 2021-07-24 LAB
ERYTHROCYTE [DISTWIDTH] IN BLOOD BY AUTOMATED COUNT: 14.3 % (ref 11.6–15.1)
HCT VFR BLD AUTO: 36.1 % (ref 34.8–46.1)
HGB BLD-MCNC: 11.4 G/DL (ref 11.5–15.4)
MCH RBC QN AUTO: 26.5 PG (ref 26.8–34.3)
MCHC RBC AUTO-ENTMCNC: 31.6 G/DL (ref 31.4–37.4)
MCV RBC AUTO: 84 FL (ref 82–98)
PLATELET # BLD AUTO: 288 THOUSANDS/UL (ref 149–390)
PMV BLD AUTO: 11.2 FL (ref 8.9–12.7)
RBC # BLD AUTO: 4.3 MILLION/UL (ref 3.81–5.12)
WBC # BLD AUTO: 8.62 THOUSAND/UL (ref 4.31–10.16)

## 2021-07-24 PROCEDURE — 36415 COLL VENOUS BLD VENIPUNCTURE: CPT

## 2021-07-24 PROCEDURE — 85027 COMPLETE CBC AUTOMATED: CPT

## 2021-07-24 PROCEDURE — 86592 SYPHILIS TEST NON-TREP QUAL: CPT

## 2021-07-26 LAB — RPR SER QL: NORMAL

## 2021-07-28 ENCOUNTER — ROUTINE PRENATAL (OUTPATIENT)
Dept: OBGYN CLINIC | Facility: CLINIC | Age: 31
End: 2021-07-28
Payer: COMMERCIAL

## 2021-07-28 VITALS — SYSTOLIC BLOOD PRESSURE: 106 MMHG | WEIGHT: 245 LBS | DIASTOLIC BLOOD PRESSURE: 66 MMHG | BODY MASS INDEX: 43.4 KG/M2

## 2021-07-28 DIAGNOSIS — O34.219 HISTORY OF CESAREAN DELIVERY, ANTEPARTUM: ICD-10-CM

## 2021-07-28 DIAGNOSIS — O24.414 INSULIN CONTROLLED GESTATIONAL DIABETES MELLITUS (GDM) IN SECOND TRIMESTER: ICD-10-CM

## 2021-07-28 DIAGNOSIS — E66.01 MATERNAL MORBID OBESITY, ANTEPARTUM (HCC): ICD-10-CM

## 2021-07-28 DIAGNOSIS — O99.210 MATERNAL MORBID OBESITY, ANTEPARTUM (HCC): ICD-10-CM

## 2021-07-28 DIAGNOSIS — Z34.93 PREGNANCY, OBSTETRICAL CARE, THIRD TRIMESTER: Primary | ICD-10-CM

## 2021-07-28 DIAGNOSIS — Z23 NEED FOR TDAP VACCINATION: ICD-10-CM

## 2021-07-28 LAB
SL AMB  POCT GLUCOSE, UA: NORMAL
SL AMB POCT URINE PROTEIN: NORMAL

## 2021-07-28 PROCEDURE — 90471 IMMUNIZATION ADMIN: CPT

## 2021-07-28 PROCEDURE — PNV: Performed by: STUDENT IN AN ORGANIZED HEALTH CARE EDUCATION/TRAINING PROGRAM

## 2021-07-28 PROCEDURE — 90715 TDAP VACCINE 7 YRS/> IM: CPT

## 2021-07-28 NOTE — PROGRESS NOTES
Doing well, no complaints  Denies bleeding, cramping, LOF   +fm  Reviewed yellow folder  Tdap administered in L deltoid; tolerated well  VIS provided

## 2021-07-28 NOTE — PROGRESS NOTES
32 y o   at 28w1d presents for routine prenatal visit  She denies contractions/leakage of fluid/vaginal bleeding  She feels good fetal movement  Problem List Items Addressed This Visit        Endocrine    Insulin controlled gestational diabetes mellitus (GDM) in second trimester  Well controlled on current regimen - 24 units of lantus at night  Continue to follow w/ DP  Growth US 8/5  APFS at 32 wks       Other    Maternal morbid obesity, antepartum ([address] )  Net weight loss for pregnancy - 4 lbs  Discuss OK as long as baby growth OK    History of  delivery, antepartum  Desires RLTCS  To be scheduled when provider schedule available        Other Visit Diagnoses     Pregnancy, obstetrical care, third trimester    -  Primary  Routine visit in 2 wks    Need for Tdap vaccination        Relevant Orders    Tdap Vaccine greater than or equal to 6yo (Completed)

## 2021-07-30 ENCOUNTER — DOCUMENTATION (OUTPATIENT)
Dept: PERINATAL CARE | Facility: CLINIC | Age: 31
End: 2021-07-30

## 2021-07-30 NOTE — PROGRESS NOTES
Date:  21  RE: Mary Montoya    : 1990  Estimated Date of Delivery: 10/19/21  EGA: 28w3d  OB/GYN: Bob Christensen  Insulin controlled gestational diabetes      Assessment and plan  Increase Lantus 24 up to 26  Units  -Several post meal elevations, ranges   Diet: 2200 Gestational diabetes meal plan; 3 meals and 3 snacks     -Bedtime snack to 1 CHO serving (15 gms) & 2-3 oz protein  SMBG: Checks blood sugar 4 times per day; fasting and 2 hour start of each meal    Meter: One Touch Verio glucose meter  Activity: Walks 30 minutes daily, follow OB recommendations  Support System: Significant Other  Patient Goal: I will plan my meals and snacks every day       Labs  21 FxF4z-4 4%    06/10/2021 A1c = 5 2%  Next A1c due by 2021    Ultrasounds  2021 Normal growth and fluid   2021 Fetal ECHO completed - Normal   Next US scheduled for 2021    Further fetal surveillance  Beginning at 32 weeks, NST / BRUNA twice a week     Jose Gannon RN

## 2021-08-05 ENCOUNTER — ULTRASOUND (OUTPATIENT)
Dept: PERINATAL CARE | Facility: OTHER | Age: 31
End: 2021-08-05
Payer: COMMERCIAL

## 2021-08-05 VITALS
WEIGHT: 246 LBS | BODY MASS INDEX: 43.59 KG/M2 | HEIGHT: 63 IN | SYSTOLIC BLOOD PRESSURE: 104 MMHG | DIASTOLIC BLOOD PRESSURE: 74 MMHG | HEART RATE: 101 BPM

## 2021-08-05 DIAGNOSIS — Z3A.29 29 WEEKS GESTATION OF PREGNANCY: ICD-10-CM

## 2021-08-05 DIAGNOSIS — O24.414 INSULIN CONTROLLED GESTATIONAL DIABETES MELLITUS (GDM) IN THIRD TRIMESTER: Primary | ICD-10-CM

## 2021-08-05 PROCEDURE — 76816 OB US FOLLOW-UP PER FETUS: CPT | Performed by: OBSTETRICS & GYNECOLOGY

## 2021-08-05 PROCEDURE — 99214 OFFICE O/P EST MOD 30 MIN: CPT | Performed by: OBSTETRICS & GYNECOLOGY

## 2021-08-05 NOTE — PROGRESS NOTES
The patient was seen today for an ultrasound  Please see ultrasound report (located under Ob Procedures) for additional details  Thank you very much for allowing us to participate in the care of this very nice patient  Should you have any questions, please do not hesitate to contact me  Twin Cedeno MD [phone] [facility]  Attending Physician, [place]

## 2021-08-09 ENCOUNTER — ROUTINE PRENATAL (OUTPATIENT)
Dept: OBGYN CLINIC | Facility: CLINIC | Age: 31
End: 2021-08-09

## 2021-08-09 VITALS — DIASTOLIC BLOOD PRESSURE: 70 MMHG | WEIGHT: 246.8 LBS | SYSTOLIC BLOOD PRESSURE: 120 MMHG | BODY MASS INDEX: 43.72 KG/M2

## 2021-08-09 DIAGNOSIS — O24.414 INSULIN CONTROLLED GESTATIONAL DIABETES MELLITUS (GDM) IN THIRD TRIMESTER: ICD-10-CM

## 2021-08-09 DIAGNOSIS — Z34.93 PREGNANCY, OBSTETRICAL CARE, THIRD TRIMESTER: Primary | ICD-10-CM

## 2021-08-09 DIAGNOSIS — O34.219 HISTORY OF CESAREAN DELIVERY, ANTEPARTUM: ICD-10-CM

## 2021-08-09 DIAGNOSIS — O34.219 PREGNANCY WITH HISTORY OF CESAREAN SECTION, ANTEPARTUM: ICD-10-CM

## 2021-08-09 DIAGNOSIS — O09.90 SUPERVISION OF HIGH RISK PREGNANCY, ANTEPARTUM: ICD-10-CM

## 2021-08-09 LAB
SL AMB  POCT GLUCOSE, UA: NORMAL
SL AMB POCT URINE PROTEIN: NORMAL

## 2021-08-09 PROCEDURE — PNV: Performed by: STUDENT IN AN ORGANIZED HEALTH CARE EDUCATION/TRAINING PROGRAM

## 2021-08-09 NOTE — PROGRESS NOTES
Pt presents for routine prenatal visit  Denies any bleeding, lof, cramping   +FM   urine -/-  S/p tdap vaccine

## 2021-08-09 NOTE — PROGRESS NOTES
32 y o   at 29w6d, here for return OB visit  Feeling well overall and without concerns  Good FM  Denies LOF, VB, contractions  Denies dysuria, hematuria  No problems with activity  No questions/concerns  Problem List Items Addressed This Visit        Endocrine    Insulin controlled gestational diabetes mellitus (GDM) in third trimester     Generally with good control  Some fasting levels elevated  Lab Results   Component Value Date    HGBA1C 5 2 06/10/2021               Other    Pregnancy with history of  section, antepartum    Supervision of high risk pregnancy, antepartum     -precautions reviewed  -s/p Tdap  -prepregnancy BMI 44 with 2# weight loss           History of  delivery, antepartum     Desires repeat, scheduling process discussed today   She will discuss with , can schedule at next visit           Other Visit Diagnoses     Pregnancy, obstetrical care, third trimester    -  Primary    Relevant Orders    POCT urine dip (Completed)

## 2021-08-09 NOTE — ASSESSMENT & PLAN NOTE
Generally with good control  Some fasting levels elevated     Lab Results   Component Value Date    HGBA1C 5 2 06/10/2021

## 2021-08-09 NOTE — ASSESSMENT & PLAN NOTE
Desires repeat, scheduling process discussed today   She will discuss with , can schedule at next visit

## 2021-08-10 ENCOUNTER — DOCUMENTATION (OUTPATIENT)
Dept: PERINATAL CARE | Facility: CLINIC | Age: 31
End: 2021-08-10

## 2021-08-19 ENCOUNTER — TELEPHONE (OUTPATIENT)
Dept: PERINATAL CARE | Facility: CLINIC | Age: 31
End: 2021-08-19

## 2021-08-19 NOTE — TELEPHONE ENCOUNTER
Left patient a message that her MFM appointment had to be rescheduled  The new time, date and location were provided  The patient has been instructed to please call us back at [phone] with any questions or concerns

## 2021-08-24 ENCOUNTER — ROUTINE PRENATAL (OUTPATIENT)
Dept: OBGYN CLINIC | Facility: CLINIC | Age: 31
End: 2021-08-24

## 2021-08-24 ENCOUNTER — ULTRASOUND (OUTPATIENT)
Dept: PERINATAL CARE | Facility: OTHER | Age: 31
End: 2021-08-24
Payer: COMMERCIAL

## 2021-08-24 VITALS — DIASTOLIC BLOOD PRESSURE: 86 MMHG | BODY MASS INDEX: 44.14 KG/M2 | SYSTOLIC BLOOD PRESSURE: 122 MMHG | WEIGHT: 249.2 LBS

## 2021-08-24 VITALS
SYSTOLIC BLOOD PRESSURE: 138 MMHG | HEIGHT: 63 IN | WEIGHT: 249.2 LBS | DIASTOLIC BLOOD PRESSURE: 76 MMHG | HEART RATE: 94 BPM | BODY MASS INDEX: 44.16 KG/M2

## 2021-08-24 DIAGNOSIS — O24.414 INSULIN CONTROLLED GESTATIONAL DIABETES MELLITUS (GDM) IN THIRD TRIMESTER: ICD-10-CM

## 2021-08-24 DIAGNOSIS — Z3A.32 32 WEEKS GESTATION OF PREGNANCY: ICD-10-CM

## 2021-08-24 DIAGNOSIS — Z34.83 ENCOUNTER FOR SUPERVISION OF OTHER NORMAL PREGNANCY, THIRD TRIMESTER: Primary | ICD-10-CM

## 2021-08-24 DIAGNOSIS — O34.219 HISTORY OF CESAREAN DELIVERY, ANTEPARTUM: ICD-10-CM

## 2021-08-24 DIAGNOSIS — O99.210 MATERNAL MORBID OBESITY, ANTEPARTUM (HCC): ICD-10-CM

## 2021-08-24 DIAGNOSIS — O34.219 PREGNANCY WITH HISTORY OF CESAREAN SECTION, ANTEPARTUM: ICD-10-CM

## 2021-08-24 DIAGNOSIS — O09.299 PRIOR FETAL MACROSOMIA, ANTEPARTUM: ICD-10-CM

## 2021-08-24 DIAGNOSIS — E66.01 MATERNAL MORBID OBESITY, ANTEPARTUM (HCC): ICD-10-CM

## 2021-08-24 DIAGNOSIS — Z3A.32 32 WEEKS GESTATION OF PREGNANCY: Primary | ICD-10-CM

## 2021-08-24 PROBLEM — O99.810 ABNORMAL GLUCOSE AFFECTING PREGNANCY: Status: RESOLVED | Noted: 2021-03-31 | Resolved: 2021-08-24

## 2021-08-24 LAB
SL AMB  POCT GLUCOSE, UA: NORMAL
SL AMB POCT URINE PROTEIN: NORMAL

## 2021-08-24 PROCEDURE — 59025 FETAL NON-STRESS TEST: CPT | Performed by: OBSTETRICS & GYNECOLOGY

## 2021-08-24 PROCEDURE — PNV: Performed by: NURSE PRACTITIONER

## 2021-08-24 PROCEDURE — 76815 OB US LIMITED FETUS(S): CPT | Performed by: OBSTETRICS & GYNECOLOGY

## 2021-08-24 NOTE — LETTER
[facility]  MRN: [id number] MRN: [id number] MRN: [id number]  : [birth year]  MAGGY/GA: MAGGY/GA: MAGGY/GA:  Date:  Date:  Date:     David Shields  MRN: [id number] MRN: [id number] MRN: [id number]  : [birth year]  MAGGY/GA: MAGGY/GA: MAGGY/GA:  Date:  Date:  Date:     [facility]  MRN: [id number] MRN: [id number] MRN: [id number]  : [birth year]  MAGGY/GA: MAGGY/GA: MAGGY/GA:  Date:  Date:  Date:     [facility]  MRN: [id number] MRN: [id number] MRN: [id number]  : [birth year]  MAGGY/GA: MAGGY/GA: MAGGY/GA:  Date:  Date:  Date:     David Shields  MRN: [id number] MRN: [id number] MRN: [id number]  : [birth year]  MAGGY/GA: MAGGY/GA: MAGGY/GA:  Date:  Date:  Date:     David Shields  MRN: [id number] MRN: [id number] MRN: [id number]  : [birth year]  MAGGY/GA: MAGGY/GA: MAGGY/GA:  Date:  Date:  Date:     David Shields  MRN: [id number] MRN: [id number] MRN: [id number]  : [birth year]  MAGGY/GA: MAGGY/GA: MAGGY/GA:  Date:  Date:  Date:

## 2021-08-24 NOTE — LETTER
NST sleeve cover sheet    Patient name: Ernst Santos  : 1990  MRN: [id number]    MAGGY: Estimated Date of Delivery: 10/19/21    Obstetrician: ____________________sloga___________    Reason(s) for testing:  ______________________________A2GDM                                                              BMI > 40____________      Testing frequency:    _x__ 2x/wk  ___ 1x/wk  ___ Dopplers  ___ BPP?       Last growth scan: __________________________________________

## 2021-08-24 NOTE — PROGRESS NOTES
Problem List Items Addressed This Visit        Endocrine    Insulin controlled gestational diabetes mellitus (GDM) in third trimester       Other    Maternal morbid obesity, antepartum ([address] )    Prior fetal macrosomia, antepartum    Pregnancy with history of  section, antepartum    History of  delivery, antepartum    Encounter for supervision of other normal pregnancy, third trimester - Primary    32 weeks gestation of pregnancy        Feels well  Reports good fetal movement  Blood sugars reportedly wnl on 26 u of insulin in the pm  Maintaining contact with [facility]  Perinatology appt scheduled for   She has started her twice weekly NSTS and weekly BRUNA  She also received her FIRST COVID vaccine last week-applauded for her efforts  Advised Social Distancing, masking and frequent handwashing   Also advised limited social/work interactions etc   Denies LOF/CTX/VB  No additional concerns  She would like a  on 10/14 and will see EC to sign for this at next visit  Breast Pump form faxed today

## 2021-08-24 NOTE — PATIENT INSTRUCTIONS
Kick Counts in Pregnancy   AMBULATORY CARE:   Kick counts  measure how much your baby is moving in your womb  A kick from your baby can be felt as a twist, turn, swish, roll, or jab  It is common to feel your baby kicking at 26 to 28 weeks of pregnancy  You may feel your baby kick as early as 20 weeks of pregnancy  You may want to start counting at 28 weeks  Contact your healthcare provider immediately if:   · You feel a change in the number of kicks or movements of your baby  · You feel fewer than 10 kicks within 2 hours  · You have questions or concerns about your baby's movements  Why measure kick counts:  Your baby's movement may provide information about your baby's health  He or she may move less, or not at all, if there are problems  Your baby may move less if he or she is not getting enough oxygen or nutrition from the placenta  Do not smoke while you are pregnant  Smoking decreases the amount of oxygen that gets to your baby  Talk to your healthcare provider if you need help to quit smoking  Tell your healthcare provider as soon as you feel a change in your baby's movements  When to measure kick counts:   · Measure kick counts at the same time every day  · Measure kick counts when your baby is awake and most active  Your baby may be most active in the evening  How to measure kick counts:  Check that your baby is awake before you measure kick counts  You can wake up your baby by lightly pushing on your belly, walking, or drinking something cold  Your healthcare provider may tell you different ways to measure kick counts  You may be told to do the following:  · Use a chart or clock to keep track of the time you start and finish counting  · Sit in a chair or lie on your left side  · Place your hands on the largest part of your belly  · Count until you reach 10 kicks  Write down how much time it takes to count 10 kicks  · It may take 30 minutes to 2 hours to count 10 kicks  It should not take more than 2 hours to count 10 kicks  Follow up with your healthcare provider as directed:  Write down your questions so you remember to ask them during your visits  © Copyright [employer] 2021 Information is for End User's use only and may not be sold, redistributed or otherwise used for commercial purposes  All illustrations and images included in CareNotes® are the copyrighted property of A D A M , Inc  or [facility] Juanito Mcdowell   The above information is an  only  It is not intended as medical advice for individual conditions or treatments  Talk to your doctor, nurse or pharmacist before following any medical regimen to see if it is safe and effective for you

## 2021-08-24 NOTE — PATIENT INSTRUCTIONS
Pregnancy at [address]:   What changes are happening in my body? You may continue to have symptoms such as shortness of breath, heartburn, contractions, or swelling of your ankles and feet  You may be gaining about 1 pound a week now  How do I care for myself at this stage of my pregnancy? · Eat a variety of healthy foods  Healthy foods include fruits, vegetables, whole-grain breads, low-fat dairy foods, beans, lean meats, and fish  Drink liquids as directed  Ask how much liquid to drink each day and which liquids are best for you  Limit caffeine to less than 200 milligrams each day  Limit your intake of fish to 2 servings each week  Choose fish low in mercury such as canned light tuna, shrimp, salmon, cod, or tilapia  Do not  eat fish high in mercury such as swordfish, tilefish, sisi mackerel, and shark  · Manage heartburn  by eating 4 or 5 small meals each day instead of large meals  Avoid spicy food  · Manage swelling  by lying down and putting your feet up  · Take prenatal vitamins as directed  Your need for certain vitamins and minerals, such as folic acid, increases during pregnancy  Prenatal vitamins provide some of the extra vitamins and minerals you need  Prenatal vitamins may also help to decrease the risk of certain birth defects  · Talk to your healthcare provider about exercise  Moderate exercise can help you stay fit  Your healthcare provider will help you plan an exercise program that is safe for you during pregnancy  · Do not smoke  Smoking increases your risk of a miscarriage and other health problems during your pregnancy  Smoking can cause your baby to be born too early or weigh less at birth  Ask your healthcare provider for information if you need help quitting  · Do not drink alcohol  Alcohol passes from your body to your baby through the placenta   It can affect your baby's brain development and cause fetal alcohol syndrome (FAS)  FAS is a group of conditions that causes mental, behavior, and growth problems  · Talk to your healthcare provider before you take any medicines  Many medicines may harm your baby if you take them when you are pregnant  Do not take any medicines, vitamins, herbs, or supplements without first talking to your healthcare provider  Never use illegal or street drugs (such as marijuana or cocaine) while you are pregnant  What are some safety tips during pregnancy? · Avoid hot tubs and saunas  Do not use a hot tub or sauna while you are pregnant, especially during your first trimester  Hot tubs and saunas may raise your baby's temperature and increase the risk of birth defects  · Avoid toxoplasmosis  This is an infection caused by eating raw meat or being around infected cat feces  It can cause birth defects, miscarriages, and other problems  Wash your hands after you touch raw meat  Make sure any meat is well-cooked before you eat it  Avoid raw eggs and unpasteurized milk  Use gloves or ask someone else to clean your cat's litter box while you are pregnant  What changes are happening with my baby? By 34 weeks, your baby may weigh more than 5 pounds  Your baby will be about 12 ½ inches long from the top of the head to the rump (baby's bottom)  Your baby is gaining about ½ pound a week  Your baby's eyes open and close now  Your baby's kicks and movements are more forceful at this time  What do I need to know about prenatal care? Your healthcare provider will check your blood pressure and weight  You may also need the following:  · A urine test  may also be done to check for sugar and protein  These can be signs of gestational diabetes or infection  Protein in your urine may also be a sign of preeclampsia  Preeclampsia is a condition that can develop during week 20 or later of your pregnancy  It causes high blood pressure, and it can cause problems with your kidneys and other organs       · A Tdap vaccine  may be recommended by your healthcare provider  · Fundal height  is a measurement of your uterus to check your baby's growth  This number is usually the same as the number of weeks that you have been pregnant  Your healthcare provider may also check your baby's position  · Your baby's heart rate  will be checked  When should I seek immediate care? · You develop a severe headache that does not go away  · You have new or increased vision changes, such as blurred or spotted vision  · You have new or increased swelling in your face or hands  · You have vaginal spotting or bleeding  · Your water broke or you feel warm water gushing or trickling from your vagina  When should I contact my healthcare provider? · You have more than 5 contractions in 1 hour  · You notice any changes in your baby's movements  · You have abdominal cramps, pressure, or tightening  · You have a change in vaginal discharge  · You have chills or a fever  · You have vaginal itching, burning, or pain  · You have yellow, green, white, or foul-smelling vaginal discharge  · You have pain or burning when you urinate, less urine than usual, or pink or bloody urine  · You have questions or concerns about your condition or care  CARE AGREEMENT:   You have the right to help plan your care  Learn about your health condition and how it may be treated  Discuss treatment options with your healthcare providers to decide what care you want to receive  You always have the right to refuse treatment  The above information is an  only  It is not intended as medical advice for individual conditions or treatments  Talk to your doctor, nurse or pharmacist before following any medical regimen to see if it is safe and effective for you  © Copyright [employer] 2021 Information is for End User's use only and may not be sold, redistributed or otherwise used for commercial purposes   All illustrations and images included in CareNotes® are the copyrighted property of [employer] A M [employer] Inc  or [employer]

## 2021-08-25 ENCOUNTER — DOCUMENTATION (OUTPATIENT)
Dept: PERINATAL CARE | Facility: CLINIC | Age: 31
End: 2021-08-25

## 2021-08-25 DIAGNOSIS — O24.414 INSULIN CONTROLLED GESTATIONAL DIABETES MELLITUS (GDM) IN THIRD TRIMESTER: Primary | ICD-10-CM

## 2021-08-25 NOTE — PROGRESS NOTES
Date:  21  RE: Alexander Wolf    : 1990  Estimated Date of Delivery: 10/19/21  EGA: 32w1d  OB/GYN: Sarah Castillo  Insulin controlled gestational diabetes      Plan:  Increase Lantus from 26 units up to 30 units  Diet:  Gestational diabetes meal plan; 3 meals and 3 snacks     -Bedtime snack to 1 CHO serving (15 gms) & 2-3 oz protein  SMBG: Checks blood sugar 4 times per day; fasting and 2 hour start of each meal    Meter: One Touch Verio glucose meter  Activity: Walks 30 minutes daily, follow OB recommendations  Support System: Significant Other  Patient Goal: I will plan my meals and snacks every day       Labs  21 [id number] 4%    06/10/2021 A1c = 5 2%  A1c and CMP ordered, needs to complete     Ultrasounds  2021 Normal growth and fluid   2021 Fetal ECHO completed - Normal   2021 Normal growth and BRUNA   Next US scheduled for 2021    Further fetal surveillance  NST / BRUNA twice a week     Donna Stephen RN

## 2021-08-27 ENCOUNTER — ROUTINE PRENATAL (OUTPATIENT)
Dept: PERINATAL CARE | Facility: OTHER | Age: 31
End: 2021-08-27
Payer: COMMERCIAL

## 2021-08-27 VITALS
SYSTOLIC BLOOD PRESSURE: 103 MMHG | BODY MASS INDEX: 44.47 KG/M2 | HEART RATE: 93 BPM | DIASTOLIC BLOOD PRESSURE: 70 MMHG | HEIGHT: 63 IN | WEIGHT: 251 LBS

## 2021-08-27 DIAGNOSIS — Z3A.32 32 WEEKS GESTATION OF PREGNANCY: Primary | ICD-10-CM

## 2021-08-27 DIAGNOSIS — O24.414 INSULIN CONTROLLED GESTATIONAL DIABETES MELLITUS (GDM) IN THIRD TRIMESTER: ICD-10-CM

## 2021-08-27 PROCEDURE — 59025 FETAL NON-STRESS TEST: CPT | Performed by: OBSTETRICS & GYNECOLOGY

## 2021-08-27 NOTE — PATIENT INSTRUCTIONS
Kick Counts in Pregnancy   AMBULATORY CARE:   Kick counts  measure how much your baby is moving in your womb  A kick from your baby can be felt as a twist, turn, swish, roll, or jab  It is common to feel your baby kicking at 26 to 28 weeks of pregnancy  You may feel your baby kick as early as 20 weeks of pregnancy  You may want to start counting at 28 weeks  Contact your healthcare provider immediately if:   · You feel a change in the number of kicks or movements of your baby  · You feel fewer than 10 kicks within 2 hours  · You have questions or concerns about your baby's movements  Why measure kick counts:  Your baby's movement may provide information about your baby's health  He or she may move less, or not at all, if there are problems  Your baby may move less if he or she is not getting enough oxygen or nutrition from the placenta  Do not smoke while you are pregnant  Smoking decreases the amount of oxygen that gets to your baby  Talk to your healthcare provider if you need help to quit smoking  Tell your healthcare provider as soon as you feel a change in your baby's movements  When to measure kick counts:   · Measure kick counts at the same time every day  · Measure kick counts when your baby is awake and most active  Your baby may be most active in the evening  How to measure kick counts:  Check that your baby is awake before you measure kick counts  You can wake up your baby by lightly pushing on your belly, walking, or drinking something cold  Your healthcare provider may tell you different ways to measure kick counts  You may be told to do the following:  · Use a chart or clock to keep track of the time you start and finish counting  · Sit in a chair or lie on your left side  · Place your hands on the largest part of your belly  · Count until you reach 10 kicks  Write down how much time it takes to count 10 kicks  · It may take 30 minutes to 2 hours to count 10 kicks  It should not take more than 2 hours to count 10 kicks  Follow up with your healthcare provider as directed:  Write down your questions so you remember to ask them during your visits  © Copyright [employer] 2021 Information is for End User's use only and may not be sold, redistributed or otherwise used for commercial purposes  All illustrations and images included in CareNotes® are the copyrighted property of A D A M [employer] Inc  or [facility] Juanito Mcdowell   The above information is an  only  It is not intended as medical advice for individual conditions or treatments  Talk to your doctor, nurse or pharmacist before following any medical regimen to see if it is safe and effective for you

## 2021-08-30 NOTE — PATIENT INSTRUCTIONS
Thank you for choosing us for your  care today  If you have any questions about your ultrasound or care, please do not hesitate to contact us or your primary obstetrician  Some general instructions for your pregnancy are:     Protect against coronavirus: get vaccinated and mask up  Pregnant women are increased risk of severe COVID  Notify your primary care doctor if you have any symptoms including cough, shortness of breath or difficulty breathing, fever, chills, muscle pain, sore throat, or loss of taste or smell   Exercise: Aim for 22 minutes per day (150 minutes per week) of regular exercise  Walking is great!  Nutrition: aim for calcium-rich and iron-rich foods as well as healthy sources of protein   Learn about Preeclampsia: preeclampsia is a common, serious high blood pressure complication in pregnancy  A blood pressure of 768UIIO (systolic or top number) or 79RRER (diastolic or bottom number) is not normal and needs evaluation by your doctor  Aspirin is sometimes prescribed in early pregnancy to prevent preeclampsia in women with risk factors - ask your obstetrician if you should be on this medication   If you smoke, try to reduce how many cigarettes you smoke or try to quit completely  Do not vape   Other warning signs to watch out for in pregnancy or postpartum: chest pain, obstructed breathing or shortness of breath, seizures, thoughts of hurting yourself or your baby, bleeding, a painful or swollen leg, fever, or headache (see [facility] POST-BIRTH Warning Signs campaign)  If these happen call 911  Itching is also not normal in pregnancy and if you experience this, especially over your hands and feet, potentially worse at night, notify your doctors

## 2021-08-31 ENCOUNTER — ULTRASOUND (OUTPATIENT)
Dept: PERINATAL CARE | Facility: OTHER | Age: 31
End: 2021-08-31
Payer: COMMERCIAL

## 2021-08-31 ENCOUNTER — APPOINTMENT (OUTPATIENT)
Dept: PERINATAL CARE | Facility: OTHER | Age: 31
End: 2021-08-31
Payer: COMMERCIAL

## 2021-08-31 ENCOUNTER — APPOINTMENT (OUTPATIENT)
Dept: LAB | Facility: HOSPITAL | Age: 31
End: 2021-08-31
Payer: COMMERCIAL

## 2021-08-31 VITALS
BODY MASS INDEX: 44.51 KG/M2 | DIASTOLIC BLOOD PRESSURE: 76 MMHG | HEART RATE: 91 BPM | SYSTOLIC BLOOD PRESSURE: 111 MMHG | WEIGHT: 251.2 LBS | HEIGHT: 63 IN

## 2021-08-31 DIAGNOSIS — O34.219 HISTORY OF CESAREAN DELIVERY, ANTEPARTUM: ICD-10-CM

## 2021-08-31 DIAGNOSIS — Z36.89 ENCOUNTER FOR ULTRASOUND TO CHECK FETAL GROWTH: ICD-10-CM

## 2021-08-31 DIAGNOSIS — O24.414 INSULIN CONTROLLED GESTATIONAL DIABETES MELLITUS (GDM) IN THIRD TRIMESTER: Primary | ICD-10-CM

## 2021-08-31 LAB
ALBUMIN SERPL BCP-MCNC: 2.2 G/DL (ref 3.5–5)
ALP SERPL-CCNC: 94 U/L (ref 46–116)
ALT SERPL W P-5'-P-CCNC: 26 U/L (ref 12–78)
ANION GAP SERPL CALCULATED.3IONS-SCNC: 12 MMOL/L (ref 4–13)
AST SERPL W P-5'-P-CCNC: 14 U/L (ref 5–45)
BILIRUB SERPL-MCNC: 0.24 MG/DL (ref 0.2–1)
BUN SERPL-MCNC: 12 MG/DL (ref 5–25)
CALCIUM ALBUM COR SERPL-MCNC: 9.9 MG/DL (ref 8.3–10.1)
CALCIUM SERPL-MCNC: 8.5 MG/DL (ref 8.3–10.1)
CHLORIDE SERPL-SCNC: 100 MMOL/L (ref 100–108)
CO2 SERPL-SCNC: 24 MMOL/L (ref 21–32)
CREAT SERPL-MCNC: 0.73 MG/DL (ref 0.6–1.3)
EST. AVERAGE GLUCOSE BLD GHB EST-MCNC: 111 MG/DL
GFR SERPL CREATININE-BSD FRML MDRD: 110 ML/MIN/1.73SQ M
GLUCOSE P FAST SERPL-MCNC: 88 MG/DL (ref 65–99)
HBA1C MFR BLD: 5.5 %
POTASSIUM SERPL-SCNC: 3.9 MMOL/L (ref 3.5–5.3)
PROT SERPL-MCNC: 6.5 G/DL (ref 6.4–8.2)
SODIUM SERPL-SCNC: 136 MMOL/L (ref 136–145)

## 2021-08-31 PROCEDURE — 36415 COLL VENOUS BLD VENIPUNCTURE: CPT

## 2021-08-31 PROCEDURE — 76816 OB US FOLLOW-UP PER FETUS: CPT | Performed by: OBSTETRICS & GYNECOLOGY

## 2021-08-31 PROCEDURE — 83036 HEMOGLOBIN GLYCOSYLATED A1C: CPT

## 2021-08-31 PROCEDURE — 59025 FETAL NON-STRESS TEST: CPT | Performed by: OBSTETRICS & GYNECOLOGY

## 2021-08-31 PROCEDURE — 99213 OFFICE O/P EST LOW 20 MIN: CPT | Performed by: OBSTETRICS & GYNECOLOGY

## 2021-08-31 PROCEDURE — 80053 COMPREHEN METABOLIC PANEL: CPT

## 2021-09-01 ENCOUNTER — DOCUMENTATION (OUTPATIENT)
Dept: PERINATAL CARE | Facility: CLINIC | Age: 31
End: 2021-09-01

## 2021-09-01 NOTE — PROGRESS NOTES
Date:  21  RE: Luis Carlos Rhodes    : 1990  Estimated Date of Delivery: 10/19/21  EGA: 33w1d  OB/GYN: Stacy Melendez  Insulin controlled gestational diabetes      Plan:  Lantus 30 units  Diet:  Gestational diabetes meal plan; 3 meals and 3 snacks     -Bedtime snack to 1 CHO serving (15 gms) & 2-3 oz protein  SMBG: Checks blood sugar 4 times per day; fasting and 2 hour start of each meal    Meter: One Touch Verio glucose meter  Activity: Walks 30 minutes daily, follow OB recommendations  Support System: Significant Other  Patient Goal: I will plan my meals and snacks every day       Labs  21 [id number] 4%    06/10/2021 A1c = 5 2%    2021 CMP - normal   2021 A1c = 5 5%    Ultrasounds  2021 Normal growth and fluid   2021 Fetal ECHO completed - Normal   2021 Normal growth and BRUNA   2021 Normal growth and BRUNA - EFW 47%   No further recommendations for growth ultrasounds    Further fetal surveillance  NST / BRUNA twice a week     Alka Curry RN

## 2021-09-03 ENCOUNTER — ROUTINE PRENATAL (OUTPATIENT)
Dept: PERINATAL CARE | Facility: OTHER | Age: 31
End: 2021-09-03
Payer: COMMERCIAL

## 2021-09-03 VITALS
HEART RATE: 92 BPM | SYSTOLIC BLOOD PRESSURE: 104 MMHG | HEIGHT: 63 IN | BODY MASS INDEX: 44.61 KG/M2 | DIASTOLIC BLOOD PRESSURE: 77 MMHG | WEIGHT: 251.8 LBS

## 2021-09-03 DIAGNOSIS — Z3A.33 33 WEEKS GESTATION OF PREGNANCY: Primary | ICD-10-CM

## 2021-09-03 DIAGNOSIS — O24.414 INSULIN CONTROLLED GESTATIONAL DIABETES MELLITUS (GDM) IN THIRD TRIMESTER: ICD-10-CM

## 2021-09-03 PROCEDURE — 59025 FETAL NON-STRESS TEST: CPT | Performed by: OBSTETRICS & GYNECOLOGY

## 2021-09-03 NOTE — PROGRESS NOTES
[address]: Ms Young Metcalf was seen today for NST (found under the pregnancy episode) which I reviewed the RN assessment and agree, and fetal growth ultrasound (see ultrasound report under OB procedures tab)  See ultrasound report under "OB Procedures" tab  The time spent on this established patient on the encounter date included 4 minutes previsit service time reviewing records and precharting, 4 minutes face-to-face service time counseling regarding results and coordinating care, and  3 minutes charting, totalling 11 minutes  Please don't hesitate to contact our office with any concerns or questions    Brooks Tariq MD
Pt  Reported active fetal movement at home between visit  Daily fetal kick count reviewed and emphasized  Patient verbalized understanding of all and was receptive      Rod Joseph RN
Alert and oriented to person, place, time/situation. normal mood and affect. no apparent risk to self or others.

## 2021-09-07 ENCOUNTER — ROUTINE PRENATAL (OUTPATIENT)
Dept: OBGYN CLINIC | Facility: CLINIC | Age: 31
End: 2021-09-07

## 2021-09-07 ENCOUNTER — ULTRASOUND (OUTPATIENT)
Dept: PERINATAL CARE | Facility: OTHER | Age: 31
End: 2021-09-07
Payer: COMMERCIAL

## 2021-09-07 VITALS — WEIGHT: 253 LBS | DIASTOLIC BLOOD PRESSURE: 80 MMHG | BODY MASS INDEX: 44.82 KG/M2 | SYSTOLIC BLOOD PRESSURE: 132 MMHG

## 2021-09-07 VITALS
BODY MASS INDEX: 44.65 KG/M2 | DIASTOLIC BLOOD PRESSURE: 65 MMHG | HEART RATE: 87 BPM | WEIGHT: 252 LBS | HEIGHT: 63 IN | SYSTOLIC BLOOD PRESSURE: 101 MMHG

## 2021-09-07 DIAGNOSIS — Z34.93 ENCOUNTER FOR PREGNANCY RELATED EXAMINATION IN THIRD TRIMESTER: Primary | ICD-10-CM

## 2021-09-07 DIAGNOSIS — O24.414 INSULIN CONTROLLED GESTATIONAL DIABETES MELLITUS (GDM) IN THIRD TRIMESTER: Primary | ICD-10-CM

## 2021-09-07 DIAGNOSIS — Z3A.34 34 WEEKS GESTATION OF PREGNANCY: ICD-10-CM

## 2021-09-07 DIAGNOSIS — O99.213 OBESITY AFFECTING PREGNANCY IN THIRD TRIMESTER: ICD-10-CM

## 2021-09-07 PROBLEM — O99.210 MATERNAL MORBID OBESITY, ANTEPARTUM (HCC): Status: RESOLVED | Noted: 2021-04-08 | Resolved: 2021-09-07

## 2021-09-07 PROBLEM — O99.210 OBESITY AFFECTING PREGNANCY: Status: ACTIVE | Noted: 2018-10-09

## 2021-09-07 PROBLEM — Z3A.32 32 WEEKS GESTATION OF PREGNANCY: Status: RESOLVED | Noted: 2021-08-05 | Resolved: 2021-09-07

## 2021-09-07 PROBLEM — E66.01 MATERNAL MORBID OBESITY, ANTEPARTUM (HCC): Status: RESOLVED | Noted: 2021-04-08 | Resolved: 2021-09-07

## 2021-09-07 LAB
SL AMB  POCT GLUCOSE, UA: NEGATIVE
SL AMB POCT URINE PROTEIN: NEGATIVE

## 2021-09-07 PROCEDURE — 59025 FETAL NON-STRESS TEST: CPT | Performed by: OBSTETRICS & GYNECOLOGY

## 2021-09-07 PROCEDURE — 76815 OB US LIMITED FETUS(S): CPT | Performed by: OBSTETRICS & GYNECOLOGY

## 2021-09-07 PROCEDURE — PNV: Performed by: OBSTETRICS & GYNECOLOGY

## 2021-09-07 NOTE — PROGRESS NOTES
Insulin controlled gestational diabetes mellitus (GDM) in third trimester  Following with MFM for GDM,   Glucose levels mostly controlled     Lab Results   Component Value Date    HGBA1C 5 5 2021       Supervision of high risk pregnancy, antepartum  Davey Foster is a 32 y o  Ron Greet  34w0d who presents for routine PNV  GDM with insulin control   28 week labs reviewed: 1 hr Glucola abnormal,   step wise part 1 & 2 completed   Denies OB complaints  Good fetal movement  Denies contractions, cramping, leakage of fluid or vaginal bleeding  Does report an occasional burning sensation on her abd skin at site of previous , advised most likely from skin stretching, advised pre support band  S/p Tdap and CDOVID vaccine # 1     Reviewed  labor precautions and FKCs     Pregnancy Essential guide and Baby and Me web site recommended

## 2021-09-07 NOTE — PROGRESS NOTES
[address]: Ms Zofia Grijalva was seen today at 34w0d for NST (found under the pregnancy episode) which I reviewed the RN assessment and agree, and BRUNA (see ultrasound report under OB procedures tab)  See ultrasound report under "OB Procedures" tab    Please don't hesitate to contact our office with any concerns or questions   -Joe Tovar MD

## 2021-09-07 NOTE — PROGRESS NOTES
Pt presents for a pnv, she is doing well  S/p tdap and first covid vaccine  Urine p/g both negative today

## 2021-09-07 NOTE — ASSESSMENT & PLAN NOTE
Andria Cornell is a 32 y o    34w0d who presents for routine PNV  GDM with insulin control   28 week labs reviewed: 1 hr Glucola abnormal,   step wise part 1 & 2 completed   Denies OB complaints  Good fetal movement  Denies contractions, cramping, leakage of fluid or vaginal bleeding  Does report an occasional burning sensation on her abd skin at site of previous , advised most likely from skin stretching, advised pre support band  S/p Tdap and CDOVID vaccine # 1     Reviewed  labor precautions and FKCs     Pregnancy Essential guide and Baby and Me web site recommended

## 2021-09-07 NOTE — ASSESSMENT & PLAN NOTE
Following with MFM for GDM,   Glucose levels mostly controlled     Lab Results   Component Value Date    HGBA1C 5 5 08/31/2021

## 2021-09-07 NOTE — PATIENT INSTRUCTIONS
Valuable Online Resource:     [facility] pregnancy essential guide    [email or web address]      On the right side of the screen is a 50 page guide providing valuable information about your entire pregnancy  On the left hand side of the site you will see several other links to great information and resources that [facility] offers     If you click on the tab that says "Pregnancy and Birth Packet" this opens another 150 page guide to labor and delivery information as well as breast feeding information,  care, pediatricians, car seat safety and much more     The  [facility] and [address] tab has a virtual tour of the new L&D unit, as well as valuable information about classes that are offered, breast feeding support, support groups and much more  Click around and enjoy all we have to offer! Please note that all information in regards to locations and visiting hours have not been updated due to COVID    We only deliver our babies at one location which is at [facility] [address]  Pregnancy at 28 to 45 Weeks   AMBULATORY CARE:   What changes are happening to your body: You are considered full term at the beginning of 37 weeks  Your breathing may be easier if your baby has moved down into a head-down position  You may need to urinate more often because the baby may be pressing on your bladder  You may also feel more discomfort and get tired easily  Seek care immediately if:   · You develop a severe headache that does not go away  · You have new or increased vision changes, such as blurred or spotted vision  · You have new or increased swelling in your face or hands  · You have vaginal spotting or bleeding  · Your water broke or you feel warm water gushing or trickling from your vagina  Contact your healthcare provider if:   · You have more than 5 contractions in 1 hour      · You notice any changes in your baby's movements  · You have abdominal cramps, pressure, or tightening  · You have a change in vaginal discharge  · You have chills or a fever  · You have vaginal itching, burning, or pain  · You have yellow, green, white, or foul-smelling vaginal discharge  · You have pain or burning when you urinate, less urine than usual, or pink or bloody urine  · You have questions or concerns about your condition or care  How to care for yourself at this stage of your pregnancy:   · Eat a variety of healthy foods  Healthy foods include fruits, vegetables, whole-grain breads, low-fat dairy foods, beans, lean meats, and fish  Drink liquids as directed  Ask how much liquid to drink each day and which liquids are best for you  Limit caffeine to less than 200 milligrams each day  Limit your intake of fish to 2 servings each week  Choose fish low in mercury such as canned light tuna, shrimp, salmon, cod, or tilapia  Do not  eat fish high in mercury such as swordfish, tilefish, sisi mackerel, and shark  · Take prenatal vitamins as directed  Your need for certain vitamins and minerals, such as folic acid, increases during pregnancy  Prenatal vitamins provide some of the extra vitamins and minerals you need  Prenatal vitamins may also help to decrease the risk of certain birth defects  · Rest as needed  Put your feet up if you have swelling in your ankles and feet  · Do not smoke  Smoking increases your risk of a miscarriage and other health problems during your pregnancy  Smoking can cause your baby to be born early or weigh less at birth  Ask your healthcare provider for information if you need help quitting  · Do not drink alcohol  Alcohol passes from your body to your baby through the placenta  It can affect your baby's brain development and cause fetal alcohol syndrome (FAS)   FAS is a group of conditions that causes mental, behavior, and growth problems  · Talk to your healthcare provider before you take any medicines  Many medicines may harm your baby if you take them when you are pregnant  Do not take any medicines, vitamins, herbs, or supplements without first talking to your healthcare provider  Never use illegal or street drugs (such as marijuana or cocaine) while you are pregnant  · Talk to your healthcare provider before you travel  You may not be able to travel in an airplane after 36 weeks  He may also recommend that you avoid long road trips  Safety tips during pregnancy:   · Avoid hot tubs and saunas  Do not use a hot tub or sauna while you are pregnant, especially during your first trimester  Hot tubs and saunas may raise your baby's temperature and increase the risk of birth defects  · Avoid toxoplasmosis  This is an infection caused by eating raw meat or being around infected cat feces  It can cause birth defects, miscarriages, and other problems  Wash your hands after you touch raw meat  Make sure any meat is well-cooked before you eat it  Avoid raw eggs and unpasteurized milk  Use gloves or ask someone else to clean your cat's litter box while you are pregnant  · Ask your healthcare provider about travel  The most comfortable time to travel is during the second trimester  Ask your healthcare provider if you can travel after 36 weeks  You may not be able to travel in an airplane after 36 weeks  He may also recommend that you avoid long road trips  Changes that are happening with your baby:  By 38 weeks, your baby may weigh between 6 and 9 pounds  Your baby may be about 14 inches long from the top of the head to the rump (baby's bottom)  Your baby hears well enough to know your voice  As your baby gets larger, you may feel fewer kicks and more stretching and rolling  Your baby may move into a head-down position  Your baby will also rest lower in your abdomen  What you need to know about prenatal care:   Your healthcare provider will check your blood pressure and weight  You may also need the following:  · A urine test  may also be done to check for sugar and protein  These can be signs of gestational diabetes or infection  Protein in your urine may also be a sign of preeclampsia  Preeclampsia is a condition that can develop during week 20 or later of your pregnancy  It causes high blood pressure, and it can cause problems with your kidneys and other organs  · A blood test  may be done to check for anemia (low iron level)  · A Tdap vaccine  may be recommended by your healthcare provider  · A group B strep test  is a test that is done to check for group B strep infection  Group B strep is a type of bacteria that may be found in the vagina or rectum  It can be passed to your baby during delivery if you have it  Your healthcare provider will take swab your vagina or rectum and send the sample to the lab for tests  · Fundal height  is a measurement of your uterus to check your baby's growth  This number is usually the same as the number of weeks that you have been pregnant  Your healthcare provider may also check your baby's position  · Your baby's heart rate  will be checked  © Copyright [employer] 2021 Information is for End User's use only and may not be sold, redistributed or otherwise used for commercial purposes  All illustrations and images included in CareNotes® are the copyrighted property of [employer]  or [employer]  The above information is an  only  It is not intended as medical advice for individual conditions or treatments  Talk to your doctor, nurse or pharmacist before following any medical regimen to see if it is safe and effective for you

## 2021-09-07 NOTE — PROGRESS NOTES
Pt  Reported active fetal movement at home between visit  Daily fetal kick count reviewed and emphasized  Patient verbalized understanding of all and was receptive      Magnus Babb RN

## 2021-09-10 ENCOUNTER — ROUTINE PRENATAL (OUTPATIENT)
Dept: PERINATAL CARE | Facility: OTHER | Age: 31
End: 2021-09-10
Payer: COMMERCIAL

## 2021-09-10 VITALS
SYSTOLIC BLOOD PRESSURE: 96 MMHG | HEART RATE: 106 BPM | BODY MASS INDEX: 44.83 KG/M2 | DIASTOLIC BLOOD PRESSURE: 71 MMHG | WEIGHT: 253 LBS | HEIGHT: 63 IN

## 2021-09-10 DIAGNOSIS — O24.414 INSULIN CONTROLLED GESTATIONAL DIABETES MELLITUS (GDM) IN THIRD TRIMESTER: Primary | ICD-10-CM

## 2021-09-10 DIAGNOSIS — Z3A.34 34 WEEKS GESTATION OF PREGNANCY: ICD-10-CM

## 2021-09-10 PROCEDURE — 59025 FETAL NON-STRESS TEST: CPT | Performed by: OBSTETRICS & GYNECOLOGY

## 2021-09-14 ENCOUNTER — ULTRASOUND (OUTPATIENT)
Dept: PERINATAL CARE | Facility: OTHER | Age: 31
End: 2021-09-14
Payer: COMMERCIAL

## 2021-09-14 ENCOUNTER — DOCUMENTATION (OUTPATIENT)
Dept: PERINATAL CARE | Facility: CLINIC | Age: 31
End: 2021-09-14

## 2021-09-14 VITALS
HEART RATE: 102 BPM | SYSTOLIC BLOOD PRESSURE: 99 MMHG | BODY MASS INDEX: 45.15 KG/M2 | DIASTOLIC BLOOD PRESSURE: 81 MMHG | WEIGHT: 254.8 LBS | HEIGHT: 63 IN

## 2021-09-14 DIAGNOSIS — Z3A.35 35 WEEKS GESTATION OF PREGNANCY: ICD-10-CM

## 2021-09-14 DIAGNOSIS — O24.414 INSULIN CONTROLLED GESTATIONAL DIABETES MELLITUS (GDM) IN THIRD TRIMESTER: Primary | ICD-10-CM

## 2021-09-14 PROCEDURE — 59025 FETAL NON-STRESS TEST: CPT | Performed by: OBSTETRICS & GYNECOLOGY

## 2021-09-14 PROCEDURE — 76815 OB US LIMITED FETUS(S): CPT | Performed by: OBSTETRICS & GYNECOLOGY

## 2021-09-14 NOTE — PROGRESS NOTES
Pt  Reported active fetal movement at home between visit  Daily fetal kick count reviewed and emphasized  Patient verbalized understanding of all and was receptive      Steven Sandhu RN

## 2021-09-14 NOTE — PROGRESS NOTES
Date:  21  RE: Caitlyn Rod    : 1990  Estimated Date of Delivery: 10/19/21  EGA: 35w0d  OB/GYN: Tomasa Nearing  Insulin controlled gestational diabetes      Plan:  Increase Lantus from 30 units up to 36 units at bedtime daily   Diet: 220 Gestational diabetes meal plan; 3 meals and 3 snacks     -Bedtime snack to 1 CHO serving (15 gms) & 2-3 oz protein  SMBG: Checks blood sugar 4 times per day; fasting and 2 hour start of each meal    Meter: One Touch Verio glucose meter  Activity: Walks 30 minutes daily, follow OB recommendations  Support System: Significant Other  Patient Goal: I will plan my meals and snacks every day       Labs  21 [id number] 4%    06/10/2021 A1c = 5 2%    2021 CMP - normal   2021 A1c = 5 5%    Ultrasounds  2021 Normal growth and fluid   2021 Fetal ECHO completed - Normal   2021 Normal growth and BRUNA   2021 Normal growth and BRUNA - EFW 47%  No further recommendations for growth ultrasounds    Further fetal surveillance  NST / BRUNA twice a week     Janene Hernandez RN

## 2021-09-17 ENCOUNTER — ROUTINE PRENATAL (OUTPATIENT)
Dept: PERINATAL CARE | Facility: OTHER | Age: 31
End: 2021-09-17

## 2021-09-17 VITALS
BODY MASS INDEX: 45.18 KG/M2 | WEIGHT: 255 LBS | SYSTOLIC BLOOD PRESSURE: 112 MMHG | HEART RATE: 115 BPM | HEIGHT: 63 IN | DIASTOLIC BLOOD PRESSURE: 79 MMHG

## 2021-09-17 DIAGNOSIS — Z3A.35 35 WEEKS GESTATION OF PREGNANCY: ICD-10-CM

## 2021-09-17 DIAGNOSIS — O24.414 INSULIN CONTROLLED GESTATIONAL DIABETES MELLITUS (GDM) IN THIRD TRIMESTER: Primary | ICD-10-CM

## 2021-09-17 PROCEDURE — NC001 PR NO CHARGE

## 2021-09-21 ENCOUNTER — ULTRASOUND (OUTPATIENT)
Dept: PERINATAL CARE | Facility: OTHER | Age: 31
End: 2021-09-21
Payer: COMMERCIAL

## 2021-09-21 ENCOUNTER — ROUTINE PRENATAL (OUTPATIENT)
Dept: OBGYN CLINIC | Facility: CLINIC | Age: 31
End: 2021-09-21

## 2021-09-21 ENCOUNTER — TELEPHONE (OUTPATIENT)
Dept: OBGYN CLINIC | Facility: CLINIC | Age: 31
End: 2021-09-21

## 2021-09-21 VITALS
WEIGHT: 256 LBS | DIASTOLIC BLOOD PRESSURE: 61 MMHG | HEART RATE: 85 BPM | HEIGHT: 63 IN | SYSTOLIC BLOOD PRESSURE: 115 MMHG | BODY MASS INDEX: 45.36 KG/M2

## 2021-09-21 VITALS
WEIGHT: 255.4 LBS | DIASTOLIC BLOOD PRESSURE: 70 MMHG | HEIGHT: 63 IN | SYSTOLIC BLOOD PRESSURE: 118 MMHG | BODY MASS INDEX: 45.25 KG/M2

## 2021-09-21 DIAGNOSIS — O99.210 MATERNAL MORBID OBESITY, ANTEPARTUM (HCC): ICD-10-CM

## 2021-09-21 DIAGNOSIS — O24.414 INSULIN CONTROLLED GESTATIONAL DIABETES MELLITUS (GDM) IN THIRD TRIMESTER: ICD-10-CM

## 2021-09-21 DIAGNOSIS — O99.213 OBESITY AFFECTING PREGNANCY IN THIRD TRIMESTER: ICD-10-CM

## 2021-09-21 DIAGNOSIS — Z3A.36 36 WEEKS GESTATION OF PREGNANCY: Primary | ICD-10-CM

## 2021-09-21 DIAGNOSIS — E66.01 MATERNAL MORBID OBESITY, ANTEPARTUM (HCC): ICD-10-CM

## 2021-09-21 DIAGNOSIS — Z34.83 ENCOUNTER FOR SUPERVISION OF NORMAL PREGNANCY IN MULTIGRAVIDA IN THIRD TRIMESTER: Primary | ICD-10-CM

## 2021-09-21 LAB
SL AMB  POCT GLUCOSE, UA: NEGATIVE
SL AMB POCT URINE PROTEIN: NEGATIVE

## 2021-09-21 PROCEDURE — 87150 DNA/RNA AMPLIFIED PROBE: CPT | Performed by: OBSTETRICS & GYNECOLOGY

## 2021-09-21 PROCEDURE — PNV: Performed by: OBSTETRICS & GYNECOLOGY

## 2021-09-21 PROCEDURE — 59025 FETAL NON-STRESS TEST: CPT | Performed by: OBSTETRICS & GYNECOLOGY

## 2021-09-21 PROCEDURE — 76815 OB US LIMITED FETUS(S): CPT | Performed by: OBSTETRICS & GYNECOLOGY

## 2021-09-21 RX ORDER — INSULIN GLARGINE 100 [IU]/ML
36 INJECTION, SOLUTION SUBCUTANEOUS
Qty: 15 ML | Refills: 0 | Status: SHIPPED | OUTPATIENT
Start: 2021-09-21 | End: 2021-10-16 | Stop reason: HOSPADM

## 2021-09-21 NOTE — PROGRESS NOTES
Repeat Non-Stress Testing:    Pt verbalizes +FM  Pt denies ALL:               Leaking of fluid   Contractions   Vaginal bleeding   Decreased fetal movement    Patient has no questions or concerns  NST reviewed by Dr Vito Garcia

## 2021-09-21 NOTE — PATIENT INSTRUCTIONS
Valuable Online Resource:    [facility]    [email or web address]      On the right side of the screen is a 50 page guide providing valuable information about your entire pregnancy  On the left hand side of the site you will see several other links to great information and resources that [facility] offers     If you click on the tab that says "Pregnancy and Birth Packet" this opens another 150 page guide to labor and delivery information as well as breast feeding information,  care, pediatricians, car seat safety and much more     The [facility] and [address] tab has a virtual tour of the new L&D unit, as well as valuable information about classes that are offered, breast feeding support, support groups and much more  Click around and enjoy all we have to offer! Please note that all information in regards to locations and visiting hours have not been updated due to COVID    We only deliver our babies at one location which is at [facility] [address]      Pregnancy at 44 to 40 Weeks   AMBULATORY CARE:   What changes are happening to your body: You are now getting close to your due date  Your due date is just an estimate of when your baby will be born  Your baby may be born before or after your due date  Your breathing may be easier if your baby has moved down into a head-down position  You may need to urinate more often because the baby may be pressing on your bladder  You may also feel more discomfort and tire easily  You may also be having trouble sleeping  Seek care immediately if:   · You develop a severe headache that does not go away  · You have new or increased vision changes, such as blurred or spotted vision  · You have new or increased swelling in your face or hands  · You have vaginal spotting or bleeding      · Your water broke or you feel warm water gushing or trickling from your vagina  Contact your healthcare provider if:   · You have more than 5 contractions in 1 hour  · You notice any changes in your baby's movements  · You have abdominal cramps, pressure, or tightening  · You have a change in vaginal discharge  · You have chills or a fever  · You have vaginal itching, burning, or pain  · You have yellow, green, white, or foul-smelling vaginal discharge  · You have pain or burning when you urinate, less urine than usual, or pink or bloody urine  · You have questions or concerns about your condition or care  How to care for yourself at this stage of your pregnancy:   · Eat a variety of healthy foods  Healthy foods include fruits, vegetables, whole-grain breads, low-fat dairy foods, beans, lean meats, and fish  Drink liquids as directed  Ask how much liquid to drink each day and which liquids are best for you  Limit caffeine to less than 200 milligrams each day  Limit your intake of fish to 2 servings each week  Choose fish low in mercury such as canned light tuna, shrimp, salmon, cod, or tilapia  Do not  eat fish high in mercury such as swordfish, tilefish, sisi mackerel, and shark  · Take prenatal vitamins as directed  Your need for certain vitamins and minerals, such as folic acid, increases during pregnancy  Prenatal vitamins provide some of the extra vitamins and minerals you need  Prenatal vitamins may also help to decrease the risk of certain birth defects  · Rest as needed  Put your feet up if you have swelling in your ankles and feet  · Do not smoke  Smoking increases your risk of a miscarriage and other health problems during your pregnancy  Smoking can cause your baby to be born early or weigh less at birth  Ask your healthcare provider for information if you need help quitting  · Do not drink alcohol  Alcohol passes from your body to your baby through the placenta   It can affect your baby's brain development and cause fetal alcohol syndrome (FAS)  FAS is a group of conditions that causes mental, behavior, and growth problems  · Talk to your healthcare provider before you take any medicines  Many medicines may harm your baby if you take them when you are pregnant  Do not take any medicines, vitamins, herbs, or supplements without first talking to your healthcare provider  Never use illegal or street drugs (such as marijuana or cocaine) while you are pregnant  · Talk to your healthcare provider before you travel  You may not be able to travel in an airplane after 36 weeks  He may also recommend that you avoid long road trips  Safety tips during pregnancy:   · Avoid hot tubs and saunas  Do not use a hot tub or sauna while you are pregnant, especially during your first trimester  Hot tubs and saunas may raise your baby's temperature and increase the risk of birth defects  · Avoid toxoplasmosis  This is an infection caused by eating raw meat or being around infected cat feces  It can cause birth defects, miscarriages, and other problems  Wash your hands after you touch raw meat  Make sure any meat is well-cooked before you eat it  Avoid raw eggs and unpasteurized milk  Use gloves or ask someone else to clean your cat's litter box while you are pregnant  · Ask your healthcare provider about travel  The most comfortable time to travel is during the second trimester  Ask your healthcare provider if you can travel after 36 weeks  You may not be able to travel in an airplane after 36 weeks  He may also recommend that you avoid long road trips  Changes that are happening with your baby: Your baby is ready to be born  At birth, your baby may weigh about 6 to 9 pounds and be about 19 to 21 inches long  Your baby may be in a head-down position  Your baby will also rest lower in your abdomen  What you need to know about prenatal care:   Your healthcare provider will check your blood pressure and weight  You may also need the following:  · A urine test  may also be done to check for sugar and protein  These can be signs of gestational diabetes or infection  Protein in your urine may also be a sign of preeclampsia  Preeclampsia is a condition that can develop during week 20 or later of your pregnancy  It causes high blood pressure, and it can cause problems with your kidneys and other organs  · Your baby's heart rate  will be checked  © Copyright [employer]  Information is for End User's use only and may not be sold, redistributed or otherwise used for commercial purposes  All illustrations and images included in CareNotes® are the copyrighted property of A D A M , Inc  or [employer]   The above information is an  only  It is not intended as medical advice for individual conditions or treatments  Talk to your doctor, nurse or pharmacist before following any medical regimen to see if it is safe and effective for you   Section   WHAT YOU SHOULD KNOW:   A  delivery, or , is abdominal surgery to deliver your baby  There are many reasons you may need a   · A  may be scheduled before labor if you had a  with your last baby  It may be scheduled if your baby is not positioned normally, or you are pregnant with more than 1 baby  · Your caregiver may perform an emergency  during labor to prevent life-threatening complications for you or your baby  A  may be done if your cervix does not dilate after several hours of active labor  · Other reasons for a  include maternal infections and problems with the placenta  AFTER YOU LEAVE:   Medicines:   · Prescription pain medicine  may be given  Ask how to take this medicine safely  · Acetaminophen  decreases pain and fever  It is available without a doctor's order  Ask how much to take and how often to take it  Follow directions   Acetaminophen can cause liver damage if not taken correctly  · NSAIDs  help decrease swelling and pain or fever  This medicine is available with or without a doctor's order  NSAIDs can cause stomach bleeding or kidney problems in certain people  If you take blood thinner medicine, always ask your obstetrician if NSAIDs are safe for you  Always read the medicine label and follow directions  · Take your medicine as directed  Contact your obstetrician (OB) if you think your medicine is not helping or if you have side effects  Tell him if you are allergic to any medicine  Keep a list of the medicines, vitamins, and herbs you take  Include the amounts, and when and why you take them  Bring the list or the pill bottles to follow-up visits  Carry your medicine list with you in case of an emergency  Follow up with your OB as directed: You may need to return to have your stitches or staples removed  Write down your questions so you remember to ask them during your visits  Wound care:  Carefully wash your wound with soap and water every day  Keep your wound clean and dry  Wear loose, comfortable clothes that do not rub against your wound  Ask your OB about bathing and showering  Drink plenty of liquids: You can lower your risk for a blood clot if you drink plenty of liquids  Ask how much liquid to drink each day and which liquids are best for you  Limit activity until you have fully recovered from surgery:   · Ask when it is safe for you to drive, walk up stairs, lift heavy objects, and have sex  · Ask when it is okay to exercise, and what types of exercise to do  Start slowly and do more as you get stronger  Contact your OB if:   · You have heavy vaginal bleeding that fills 1 or more sanitary pads in 1 hour  · You have a fever  · Your incision is swollen, red, or draining pus  · You have questions or concerns about yourself or your baby  Seek care immediately or call 911 if:   · Blood soaks through your bandage  · Your stitches come apart  · You feel lightheaded, short of breath, and have chest pain  · You cough up blood  · Your arm or leg feels warm, tender, and painful  It may look swollen and red  © 2014 [address] is for End User's use only and may not be sold, redistributed or otherwise used for commercial purposes  All illustrations and images included in CareNotes® are the copyrighted property of A D A M , Inc  or Jordan Rodriguez  The above information is an  only  It is not intended as medical advice for individual conditions or treatments  Talk to your doctor, nurse or pharmacist before following any medical regimen to see if it is safe and effective for you

## 2021-09-21 NOTE — ASSESSMENT & PLAN NOTE
Renetta Montesinos is a 32 y o    36w0d who presents for routine PNV  Beta strep swab collected today   GDM insulin controlled  Pt to be scheduled C- Section, she reports thinking she was scheduled for 10/14/21  I do not see that in her encounters  Message sent to [facility] pool for clarification  I do not see delivery consent under media tab  Consent will need signed with physician  28 week labs reviewed: Stepwise part 1 & 2 completed, abnormal glucose   Denies OB complaints  Good fetal movement  Denies contractions, cramping, leakage of fluid or vaginal bleeding  S/p Tdap and COVID  vaccine  Reviewed  labor precautions and FKCs     Pregnancy Essential guide and Baby and Me web site recommended

## 2021-09-21 NOTE — TELEPHONE ENCOUNTER
----- Message from Nelda Sevilla [address] sent at 2021  4:04 PM EDT -----  Hi this patient was scheduled with Dr Leo Mcderomtt today but she is out  I am seeing her for pre gabe visit  She mentions a scheduled C section on 10/14/21     I do not see that in her listed encounters   Can you verify and let me know please     Thank you,   Elva Moore

## 2021-09-21 NOTE — PROGRESS NOTES
A fetal ultrasound was completed  See Ob procedures in Epic for an interpretation and recommendations  Do not hesitate to contact us in [facility] if you have questions  Sharath Casillas MD, [address] [facility]  Maternal Fetal Medicine

## 2021-09-22 NOTE — TELEPHONE ENCOUNTER
I called and spoke with Lory Hillman in L& D pt is not scheduled for 10/14/21 for a    Please advise

## 2021-09-22 NOTE — TELEPHONE ENCOUNTER
Pt edc 10/19/21, nelson on, pt scheduled for 10 am on 10/14/21 [employer] for repeat   Pt contacted and informed  location, date, time arrival 8 am, masking policy  Pt agreed to plan of action  Pt informed provider nelson  Pt offered appts with provider nelson  Pt declined at this time due to travel from [facility] does not work with her schedule

## 2021-09-23 ENCOUNTER — TELEPHONE (OUTPATIENT)
Dept: OBGYN CLINIC | Facility: CLINIC | Age: 31
End: 2021-09-23

## 2021-09-23 LAB — GP B STREP DNA SPEC QL NAA+PROBE: POSITIVE

## 2021-09-23 NOTE — TELEPHONE ENCOUNTER
Called pt- per communication consent, L/M informing pt of showering with chlorhexadine soap evening prior & morning of C/SEct  Also, L & D will call pt evening prior to procedure to advise of time to report to L & D- generally 1 5 hrs prior to time of C/S  Advised to call with any further questions/concerns

## 2021-09-23 NOTE — TELEPHONE ENCOUNTER
----- Message from Eloy Stroud sent at 2021  4:59 PM EDT -----  Regarding: Non-Urgent Medical Question  Contact: [phone]  Do I need to do anything prior to my  or will they give me something that day?     Thanks, Lux Clayton

## 2021-09-24 ENCOUNTER — ROUTINE PRENATAL (OUTPATIENT)
Dept: PERINATAL CARE | Facility: OTHER | Age: 31
End: 2021-09-24
Payer: COMMERCIAL

## 2021-09-24 VITALS
DIASTOLIC BLOOD PRESSURE: 82 MMHG | SYSTOLIC BLOOD PRESSURE: 126 MMHG | HEIGHT: 63 IN | WEIGHT: 256 LBS | HEART RATE: 86 BPM | BODY MASS INDEX: 45.36 KG/M2

## 2021-09-24 DIAGNOSIS — O24.414 INSULIN CONTROLLED GESTATIONAL DIABETES MELLITUS (GDM) IN THIRD TRIMESTER: Primary | ICD-10-CM

## 2021-09-24 DIAGNOSIS — Z3A.36 36 WEEKS GESTATION OF PREGNANCY: ICD-10-CM

## 2021-09-24 PROCEDURE — 59025 FETAL NON-STRESS TEST: CPT | Performed by: OBSTETRICS & GYNECOLOGY

## 2021-09-24 NOTE — PROGRESS NOTES
Pt  Reported active fetal movement at home between visit  Daily fetal kick count reviewed and emphasized  Patient verbalized understanding of all and was receptive      Dai Chen RN

## 2021-09-27 ENCOUNTER — DOCUMENTATION (OUTPATIENT)
Dept: PERINATAL CARE | Facility: CLINIC | Age: 31
End: 2021-09-27

## 2021-09-27 NOTE — PROGRESS NOTES
Date:  21  RE: Aubree Grady    : 1990  Estimated Date of Delivery: 10/19/21  EGA: 36w6d  OB/GYN: Dena Rubalcava  Insulin controlled gestational diabetes    Attempted to call patient to receive most recent blood sugars over the telephone, no answer, left voicemail to call our office to discuss updated regimen over the phone  Also sent Veohhart message to update current insulin dose  Plan:  Increase Lantus from 36 to 42 units at bedtime daily   Diet:  Gestational diabetes meal plan; 3 meals and 3 snacks     -Bedtime snack to 1 CHO serving (15 gms) & 2-3 oz protein  SMBG: Checks blood sugar 4 times per day; fasting and 2 hour start of each meal    Meter: One Touch Verio glucose meter  Activity: Walks 30 minutes daily, follow OB recommendations  Support System: Significant Other  Patient Goal: I will plan my meals and snacks every day       Labs  21 JoH8h-1 4%    06/10/2021 A1c = 5 2%    2021 CMP - normal   2021 A1c = 5 5%    Ultrasounds  2021 Normal growth and fluid   2021 Fetal ECHO completed - Normal   2021 Normal growth and BRUNA   2021 Normal growth and BRUNA - EFW 47%  No further recommendations for growth ultrasounds    Further fetal surveillance  NST / BRUNA twice a week     Ino Babin RD, LDN  Diabetes Educator  [facility] Maternal Fetal Medicine  Diabetes in Pregnancy Program  Ino Babin

## 2021-09-28 ENCOUNTER — ULTRASOUND (OUTPATIENT)
Dept: PERINATAL CARE | Facility: OTHER | Age: 31
End: 2021-09-28
Payer: COMMERCIAL

## 2021-09-28 VITALS
HEART RATE: 95 BPM | HEIGHT: 63 IN | DIASTOLIC BLOOD PRESSURE: 68 MMHG | BODY MASS INDEX: 45.89 KG/M2 | SYSTOLIC BLOOD PRESSURE: 133 MMHG | WEIGHT: 259 LBS

## 2021-09-28 DIAGNOSIS — Z3A.37 37 WEEKS GESTATION OF PREGNANCY: Primary | ICD-10-CM

## 2021-09-28 DIAGNOSIS — O24.414 INSULIN CONTROLLED GESTATIONAL DIABETES MELLITUS (GDM) IN THIRD TRIMESTER: ICD-10-CM

## 2021-09-28 PROCEDURE — 59025 FETAL NON-STRESS TEST: CPT | Performed by: OBSTETRICS & GYNECOLOGY

## 2021-09-28 PROCEDURE — 76815 OB US LIMITED FETUS(S): CPT | Performed by: OBSTETRICS & GYNECOLOGY

## 2021-09-28 NOTE — PROGRESS NOTES
[address]: Ms Sara Wilkinson was seen today at 37w0d for NST (found under the pregnancy episode) which I reviewed the RN assessment and agree, and BRUNA (see ultrasound report under OB procedures tab)  See ultrasound report under "OB Procedures" tab    Please don't hesitate to contact our office with any concerns or questions   -Tasneem Crowley MD

## 2021-09-28 NOTE — LETTER
September 28, 2021     Julian Rosana, [address] [place] [address]    Patient: Ines Bhatt   YOB: 1990   Date of Visit: 9/28/2021       Dear Dr Tato Ramírez: Thank you for referring Ines Bhatt to me for evaluation  Below are my notes for this consultation  If you have questions, please do not hesitate to call me  I look forward to following your patient along with you  Sincerely,        Kristy Roy MD        CC: No Recipients  Kristy Roy MD  9/28/2021  4:20 PM  Sign when Signing Visit  [address]: Ms Aubree Partida was seen today at 37w0d for NST (found under the pregnancy episode) which I reviewed the RN assessment and agree, and BRUNA (see ultrasound report under OB procedures tab)  See ultrasound report under "OB Procedures" tab    Please don't hesitate to contact our office with any concerns or questions   -Kristy Roy MD

## 2021-09-28 NOTE — PROGRESS NOTES
Pt  Reported active fetal movement at home between visit  Daily fetal kick count reviewed and emphasized  Patient verbalized understanding of all and was receptive      Harmeet Sandoval RN

## 2021-09-29 ENCOUNTER — ROUTINE PRENATAL (OUTPATIENT)
Dept: OBGYN CLINIC | Facility: CLINIC | Age: 31
End: 2021-09-29

## 2021-09-29 VITALS — WEIGHT: 259 LBS | BODY MASS INDEX: 45.88 KG/M2 | SYSTOLIC BLOOD PRESSURE: 130 MMHG | DIASTOLIC BLOOD PRESSURE: 76 MMHG

## 2021-09-29 DIAGNOSIS — Z3A.37 37 WEEKS GESTATION OF PREGNANCY: ICD-10-CM

## 2021-09-29 DIAGNOSIS — O34.219 HISTORY OF CESAREAN DELIVERY, ANTEPARTUM: ICD-10-CM

## 2021-09-29 DIAGNOSIS — O24.414 INSULIN CONTROLLED GESTATIONAL DIABETES MELLITUS (GDM) IN THIRD TRIMESTER: ICD-10-CM

## 2021-09-29 DIAGNOSIS — Z34.83 ENCOUNTER FOR SUPERVISION OF NORMAL PREGNANCY IN MULTIGRAVIDA IN THIRD TRIMESTER: Primary | ICD-10-CM

## 2021-09-29 PROBLEM — O09.90 SUPERVISION OF HIGH RISK PREGNANCY, ANTEPARTUM: Status: RESOLVED | Noted: 2021-04-08 | Resolved: 2021-09-29

## 2021-09-29 PROBLEM — B95.1 POSITIVE GBS TEST: Status: ACTIVE | Noted: 2021-09-29

## 2021-09-29 LAB
SL AMB  POCT GLUCOSE, UA: ABNORMAL
SL AMB POCT URINE PROTEIN: ABNORMAL

## 2021-09-29 PROCEDURE — PNV: Performed by: OBSTETRICS & GYNECOLOGY

## 2021-09-29 NOTE — PROGRESS NOTES
Problem   Positive Gbs Test   37 Weeks Gestation of Pregnancy    Plan for csection 39 weeks, declines tubal  Consent signed    GBS positive  Signs of labor and fetal kick counts reviewed  Surgical soap/instructions given         Insulin Controlled Gestational Diabetes Mellitus (Gdm) in Third Trimester    Plan 1/2 dose at night prior to csection  In contact with [facility] Diabetic pathways  APFS has been scheduled     Encounter for Supervision of Other Normal Pregnancy, Third Trimester (Resolved)   Pregnancy With History of  Section, Antepartum (Resolved)   Supervision of High Risk Pregnancy, Antepartum (Resolved)

## 2021-10-01 ENCOUNTER — ROUTINE PRENATAL (OUTPATIENT)
Dept: PERINATAL CARE | Facility: OTHER | Age: 31
End: 2021-10-01
Payer: COMMERCIAL

## 2021-10-01 VITALS
HEART RATE: 98 BPM | HEIGHT: 63 IN | BODY MASS INDEX: 46.07 KG/M2 | DIASTOLIC BLOOD PRESSURE: 81 MMHG | SYSTOLIC BLOOD PRESSURE: 119 MMHG | WEIGHT: 260 LBS

## 2021-10-01 DIAGNOSIS — O24.414 INSULIN CONTROLLED GESTATIONAL DIABETES MELLITUS (GDM) IN THIRD TRIMESTER: ICD-10-CM

## 2021-10-01 DIAGNOSIS — Z3A.37 37 WEEKS GESTATION OF PREGNANCY: Primary | ICD-10-CM

## 2021-10-01 PROCEDURE — 59025 FETAL NON-STRESS TEST: CPT | Performed by: OBSTETRICS & GYNECOLOGY

## 2021-10-04 ENCOUNTER — DOCUMENTATION (OUTPATIENT)
Dept: PERINATAL CARE | Facility: CLINIC | Age: 31
End: 2021-10-04

## 2021-10-05 ENCOUNTER — ROUTINE PRENATAL (OUTPATIENT)
Dept: PERINATAL CARE | Facility: OTHER | Age: 31
End: 2021-10-05
Payer: COMMERCIAL

## 2021-10-05 ENCOUNTER — ULTRASOUND (OUTPATIENT)
Dept: PERINATAL CARE | Facility: OTHER | Age: 31
End: 2021-10-05
Payer: COMMERCIAL

## 2021-10-05 ENCOUNTER — ROUTINE PRENATAL (OUTPATIENT)
Dept: OBGYN CLINIC | Age: 31
End: 2021-10-05

## 2021-10-05 VITALS — BODY MASS INDEX: 46.3 KG/M2 | WEIGHT: 261.4 LBS | DIASTOLIC BLOOD PRESSURE: 82 MMHG | SYSTOLIC BLOOD PRESSURE: 128 MMHG

## 2021-10-05 VITALS
HEIGHT: 63 IN | HEART RATE: 98 BPM | DIASTOLIC BLOOD PRESSURE: 76 MMHG | SYSTOLIC BLOOD PRESSURE: 115 MMHG | WEIGHT: 261 LBS | BODY MASS INDEX: 46.25 KG/M2

## 2021-10-05 DIAGNOSIS — O24.414 INSULIN CONTROLLED GESTATIONAL DIABETES MELLITUS (GDM) IN THIRD TRIMESTER: ICD-10-CM

## 2021-10-05 DIAGNOSIS — Z34.83 ENCOUNTER FOR SUPERVISION OF OTHER NORMAL PREGNANCY IN THIRD TRIMESTER: Primary | ICD-10-CM

## 2021-10-05 DIAGNOSIS — O24.414 INSULIN CONTROLLED GESTATIONAL DIABETES MELLITUS (GDM) IN THIRD TRIMESTER: Primary | ICD-10-CM

## 2021-10-05 DIAGNOSIS — Z3A.38 38 WEEKS GESTATION OF PREGNANCY: ICD-10-CM

## 2021-10-05 DIAGNOSIS — O34.219 HISTORY OF CESAREAN DELIVERY, ANTEPARTUM: ICD-10-CM

## 2021-10-05 LAB
SL AMB  POCT GLUCOSE, UA: NEGATIVE
SL AMB POCT URINE PROTEIN: NEGATIVE

## 2021-10-05 PROCEDURE — 59025 FETAL NON-STRESS TEST: CPT | Performed by: OBSTETRICS & GYNECOLOGY

## 2021-10-05 PROCEDURE — 76816 OB US FOLLOW-UP PER FETUS: CPT | Performed by: OBSTETRICS & GYNECOLOGY

## 2021-10-05 PROCEDURE — PNV: Performed by: STUDENT IN AN ORGANIZED HEALTH CARE EDUCATION/TRAINING PROGRAM

## 2021-10-05 PROCEDURE — NC001 PR NO CHARGE

## 2021-10-07 ENCOUNTER — ROUTINE PRENATAL (OUTPATIENT)
Dept: PERINATAL CARE | Facility: OTHER | Age: 31
End: 2021-10-07
Payer: COMMERCIAL

## 2021-10-07 VITALS
SYSTOLIC BLOOD PRESSURE: 120 MMHG | HEART RATE: 100 BPM | HEIGHT: 63 IN | WEIGHT: 261.4 LBS | DIASTOLIC BLOOD PRESSURE: 66 MMHG | BODY MASS INDEX: 46.32 KG/M2

## 2021-10-07 DIAGNOSIS — O24.414 INSULIN CONTROLLED GESTATIONAL DIABETES MELLITUS (GDM) IN THIRD TRIMESTER: Primary | ICD-10-CM

## 2021-10-07 DIAGNOSIS — Z3A.38 38 WEEKS GESTATION OF PREGNANCY: ICD-10-CM

## 2021-10-07 PROCEDURE — 59025 FETAL NON-STRESS TEST: CPT | Performed by: OBSTETRICS & GYNECOLOGY

## 2021-10-08 PROBLEM — O09.93 SUPERVISION OF HIGH RISK PREGNANCY IN THIRD TRIMESTER: Status: RESOLVED | Noted: 2021-04-08 | Resolved: 2021-09-29

## 2021-10-12 ENCOUNTER — ULTRASOUND (OUTPATIENT)
Dept: PERINATAL CARE | Facility: OTHER | Age: 31
End: 2021-10-12
Payer: COMMERCIAL

## 2021-10-12 VITALS
WEIGHT: 262.2 LBS | DIASTOLIC BLOOD PRESSURE: 57 MMHG | HEIGHT: 63 IN | BODY MASS INDEX: 46.46 KG/M2 | HEART RATE: 100 BPM | SYSTOLIC BLOOD PRESSURE: 127 MMHG

## 2021-10-12 DIAGNOSIS — O24.414 INSULIN CONTROLLED GESTATIONAL DIABETES MELLITUS (GDM) IN THIRD TRIMESTER: Primary | ICD-10-CM

## 2021-10-12 DIAGNOSIS — Z3A.39 39 WEEKS GESTATION OF PREGNANCY: ICD-10-CM

## 2021-10-12 PROCEDURE — 76815 OB US LIMITED FETUS(S): CPT | Performed by: OBSTETRICS & GYNECOLOGY

## 2021-10-12 PROCEDURE — 59025 FETAL NON-STRESS TEST: CPT | Performed by: OBSTETRICS & GYNECOLOGY

## 2021-10-14 ENCOUNTER — ANESTHESIA EVENT (INPATIENT)
Dept: LABOR AND DELIVERY | Facility: HOSPITAL | Age: 31
End: 2021-10-14
Payer: COMMERCIAL

## 2021-10-14 ENCOUNTER — HOSPITAL ENCOUNTER (INPATIENT)
Facility: HOSPITAL | Age: 31
LOS: 2 days | Discharge: HOME | End: 2021-10-16
Attending: OBSTETRICS & GYNECOLOGY | Admitting: OBSTETRICS & GYNECOLOGY
Payer: COMMERCIAL

## 2021-10-14 ENCOUNTER — ANESTHESIA (INPATIENT)
Dept: LABOR AND DELIVERY | Facility: HOSPITAL | Age: 31
End: 2021-10-14
Payer: COMMERCIAL

## 2021-10-14 DIAGNOSIS — O34.219 PREVIOUS CESAREAN SECTION COMPLICATING PREGNANCY: ICD-10-CM

## 2021-10-14 DIAGNOSIS — Z3A.38 38 WEEKS GESTATION OF PREGNANCY: Primary | ICD-10-CM

## 2021-10-14 DIAGNOSIS — Z98.891 STATUS POST REPEAT LOW TRANSVERSE CESAREAN SECTION: ICD-10-CM

## 2021-10-14 DIAGNOSIS — O34.219 HISTORY OF CESAREAN DELIVERY, ANTEPARTUM: ICD-10-CM

## 2021-10-14 PROBLEM — Z3A.39 39 WEEKS GESTATION OF PREGNANCY: Status: RESOLVED | Noted: 2021-09-07 | Resolved: 2021-10-14

## 2021-10-14 PROBLEM — Z3A.39 39 WEEKS GESTATION OF PREGNANCY: Status: ACTIVE | Noted: 2021-09-07

## 2021-10-14 LAB
ABO GROUP BLD: NORMAL
BASE EXCESS BLDCOA CALC-SCNC: -0.2 MMOL/L (ref 3–11)
BASE EXCESS BLDCOV CALC-SCNC: -2.8 MMOL/L (ref 1–9)
BLD GP AB SCN SERPL QL: NEGATIVE
ERYTHROCYTE [DISTWIDTH] IN BLOOD BY AUTOMATED COUNT: 14.5 % (ref 11.6–15.1)
GLUCOSE SERPL-MCNC: 73 MG/DL (ref 65–140)
GLUCOSE SERPL-MCNC: 75 MG/DL (ref 65–140)
HCO3 BLDCOA-SCNC: 27.6 MMOL/L (ref 17.3–27.3)
HCO3 BLDCOV-SCNC: 21.8 MMOL/L (ref 12.2–28.6)
HCT VFR BLD AUTO: 35 % (ref 34.8–46.1)
HGB BLD-MCNC: 10.8 G/DL (ref 11.5–15.4)
HOLD SPECIMEN: NORMAL
MCH RBC QN AUTO: 24.2 PG (ref 26.8–34.3)
MCHC RBC AUTO-ENTMCNC: 30.9 G/DL (ref 31.4–37.4)
MCV RBC AUTO: 79 FL (ref 82–98)
O2 CT VFR BLDCOA CALC: 5.7 ML/DL
OXYHGB MFR BLDCOA: 27.2 %
OXYHGB MFR BLDCOV: 84.8 %
PCO2 BLDCOA: 58.4 MM[HG] (ref 30–60)
PCO2 BLDCOV: 37.3 MM HG (ref 27–43)
PH BLDCOA: 7.29 [PH] (ref 7.23–7.43)
PH BLDCOV: 7.38 [PH] (ref 7.19–7.49)
PLATELET # BLD AUTO: 277 THOUSANDS/UL (ref 149–390)
PMV BLD AUTO: 9.9 FL (ref 8.9–12.7)
PO2 BLDCOA: 15 MM HG (ref 5–25)
PO2 BLDCOV: 40.8 MM HG (ref 15–45)
RBC # BLD AUTO: 4.46 MILLION/UL (ref 3.81–5.12)
RH BLD: POSITIVE
RPR SER QL: NORMAL
SAO2 % BLDCOV: 17.7 ML/DL
SPECIMEN EXPIRATION DATE: NORMAL
WBC # BLD AUTO: 10.34 THOUSAND/UL (ref 4.31–10.16)

## 2021-10-14 PROCEDURE — 86900 BLOOD TYPING SEROLOGIC ABO: CPT | Performed by: OBSTETRICS & GYNECOLOGY

## 2021-10-14 PROCEDURE — 86901 BLOOD TYPING SEROLOGIC RH(D): CPT | Performed by: OBSTETRICS & GYNECOLOGY

## 2021-10-14 PROCEDURE — 82948 REAGENT STRIP/BLOOD GLUCOSE: CPT

## 2021-10-14 PROCEDURE — 59510 CESAREAN DELIVERY: CPT | Performed by: OBSTETRICS & GYNECOLOGY

## 2021-10-14 PROCEDURE — 82805 BLOOD GASES W/O2 SATURATION: CPT | Performed by: OBSTETRICS & GYNECOLOGY

## 2021-10-14 PROCEDURE — 85027 COMPLETE CBC AUTOMATED: CPT | Performed by: OBSTETRICS & GYNECOLOGY

## 2021-10-14 PROCEDURE — 88307 TISSUE EXAM BY PATHOLOGIST: CPT | Performed by: PATHOLOGY

## 2021-10-14 PROCEDURE — 86592 SYPHILIS TEST NON-TREP QUAL: CPT | Performed by: OBSTETRICS & GYNECOLOGY

## 2021-10-14 PROCEDURE — 86850 RBC ANTIBODY SCREEN: CPT | Performed by: OBSTETRICS & GYNECOLOGY

## 2021-10-14 PROCEDURE — NC001 PR NO CHARGE: Performed by: OBSTETRICS & GYNECOLOGY

## 2021-10-14 RX ORDER — ACETAMINOPHEN 325 MG/1
650 TABLET ORAL EVERY 6 HOURS
Status: DISCONTINUED | OUTPATIENT
Start: 2021-10-14 | End: 2021-10-14

## 2021-10-14 RX ORDER — OXYTOCIN/RINGER'S LACTATE 30/500 ML
PLASTIC BAG, INJECTION (ML) INTRAVENOUS CONTINUOUS PRN
Status: DISCONTINUED | OUTPATIENT
Start: 2021-10-14 | End: 2021-10-14

## 2021-10-14 RX ORDER — CEFAZOLIN SODIUM 2 G/50ML
2000 SOLUTION INTRAVENOUS ONCE
Status: DISCONTINUED | OUTPATIENT
Start: 2021-10-14 | End: 2021-10-14

## 2021-10-14 RX ORDER — MORPHINE SULFATE 1 MG/ML
INJECTION, SOLUTION EPIDURAL; INTRATHECAL; INTRAVENOUS AS NEEDED
Status: DISCONTINUED | OUTPATIENT
Start: 2021-10-14 | End: 2021-10-14

## 2021-10-14 RX ORDER — FENTANYL CITRATE 50 UG/ML
INJECTION, SOLUTION INTRAMUSCULAR; INTRAVENOUS AS NEEDED
Status: DISCONTINUED | OUTPATIENT
Start: 2021-10-14 | End: 2021-10-14

## 2021-10-14 RX ORDER — SODIUM CHLORIDE, SODIUM LACTATE, POTASSIUM CHLORIDE, CALCIUM CHLORIDE 600; 310; 30; 20 MG/100ML; MG/100ML; MG/100ML; MG/100ML
INJECTION, SOLUTION INTRAVENOUS CONTINUOUS PRN
Status: DISCONTINUED | OUTPATIENT
Start: 2021-10-14 | End: 2021-10-14

## 2021-10-14 RX ORDER — OXYCODONE HYDROCHLORIDE 5 MG/1
10 TABLET ORAL EVERY 4 HOURS PRN
Status: DISCONTINUED | OUTPATIENT
Start: 2021-10-15 | End: 2021-10-14

## 2021-10-14 RX ORDER — SODIUM CHLORIDE 9 MG/ML
125 INJECTION, SOLUTION INTRAVENOUS CONTINUOUS
Status: DISCONTINUED | OUTPATIENT
Start: 2021-10-14 | End: 2021-10-14

## 2021-10-14 RX ORDER — DEXAMETHASONE SODIUM PHOSPHATE 4 MG/ML
INJECTION, SOLUTION INTRA-ARTICULAR; INTRALESIONAL; INTRAMUSCULAR; INTRAVENOUS; SOFT TISSUE AS NEEDED
Status: DISCONTINUED | OUTPATIENT
Start: 2021-10-14 | End: 2021-10-14

## 2021-10-14 RX ORDER — ACETAMINOPHEN 325 MG/1
650 TABLET ORAL EVERY 6 HOURS SCHEDULED
Status: DISCONTINUED | OUTPATIENT
Start: 2021-10-14 | End: 2021-10-16 | Stop reason: HOSPADM

## 2021-10-14 RX ORDER — OXYCODONE HYDROCHLORIDE 5 MG/1
10 TABLET ORAL EVERY 4 HOURS PRN
Status: CANCELLED | OUTPATIENT
Start: 2021-10-15

## 2021-10-14 RX ORDER — IBUPROFEN 600 MG/1
600 TABLET ORAL EVERY 6 HOURS SCHEDULED
Status: DISCONTINUED | OUTPATIENT
Start: 2021-10-14 | End: 2021-10-14

## 2021-10-14 RX ORDER — FENTANYL CITRATE/PF 50 MCG/ML
25 SYRINGE (ML) INJECTION
Status: DISCONTINUED | OUTPATIENT
Start: 2021-10-14 | End: 2021-10-16 | Stop reason: HOSPADM

## 2021-10-14 RX ORDER — SIMETHICONE 80 MG
80 TABLET,CHEWABLE ORAL 4 TIMES DAILY PRN
Status: DISCONTINUED | OUTPATIENT
Start: 2021-10-14 | End: 2021-10-16 | Stop reason: HOSPADM

## 2021-10-14 RX ORDER — OXYCODONE HYDROCHLORIDE 5 MG/1
5 TABLET ORAL EVERY 4 HOURS PRN
Status: DISCONTINUED | OUTPATIENT
Start: 2021-10-15 | End: 2021-10-14

## 2021-10-14 RX ORDER — TRISODIUM CITRATE DIHYDRATE AND CITRIC ACID MONOHYDRATE 500; 334 MG/5ML; MG/5ML
30 SOLUTION ORAL ONCE
Status: COMPLETED | OUTPATIENT
Start: 2021-10-14 | End: 2021-10-14

## 2021-10-14 RX ORDER — ONDANSETRON 2 MG/ML
4 INJECTION INTRAMUSCULAR; INTRAVENOUS EVERY 8 HOURS PRN
Status: DISCONTINUED | OUTPATIENT
Start: 2021-10-14 | End: 2021-10-14

## 2021-10-14 RX ORDER — ACETAMINOPHEN 325 MG/1
650 TABLET ORAL
Status: DISCONTINUED | OUTPATIENT
Start: 2021-10-14 | End: 2021-10-14

## 2021-10-14 RX ORDER — BUPIVACAINE HYDROCHLORIDE 7.5 MG/ML
INJECTION, SOLUTION INTRASPINAL AS NEEDED
Status: DISCONTINUED | OUTPATIENT
Start: 2021-10-14 | End: 2021-10-14

## 2021-10-14 RX ORDER — ACETAMINOPHEN 325 MG/1
650 TABLET ORAL
Status: CANCELLED | OUTPATIENT
Start: 2021-10-14

## 2021-10-14 RX ORDER — ONDANSETRON 2 MG/ML
INJECTION INTRAMUSCULAR; INTRAVENOUS AS NEEDED
Status: DISCONTINUED | OUTPATIENT
Start: 2021-10-14 | End: 2021-10-14

## 2021-10-14 RX ORDER — DOCUSATE SODIUM 100 MG/1
100 CAPSULE, LIQUID FILLED ORAL 2 TIMES DAILY
Status: DISCONTINUED | OUTPATIENT
Start: 2021-10-14 | End: 2021-10-16 | Stop reason: HOSPADM

## 2021-10-14 RX ORDER — OXYCODONE HYDROCHLORIDE 5 MG/1
5 TABLET ORAL EVERY 4 HOURS PRN
Status: CANCELLED | OUTPATIENT
Start: 2021-10-15

## 2021-10-14 RX ORDER — HYDROMORPHONE HCL/PF 1 MG/ML
0.5 SYRINGE (ML) INJECTION
Status: DISCONTINUED | OUTPATIENT
Start: 2021-10-14 | End: 2021-10-16 | Stop reason: HOSPADM

## 2021-10-14 RX ORDER — NALBUPHINE HCL 10 MG/ML
10 AMPUL (ML) INJECTION
Status: ACTIVE | OUTPATIENT
Start: 2021-10-14 | End: 2021-10-15

## 2021-10-14 RX ORDER — CEFAZOLIN SODIUM 2 G/50ML
SOLUTION INTRAVENOUS AS NEEDED
Status: DISCONTINUED | OUTPATIENT
Start: 2021-10-14 | End: 2021-10-14

## 2021-10-14 RX ORDER — SODIUM CHLORIDE, SODIUM LACTATE, POTASSIUM CHLORIDE, CALCIUM CHLORIDE 600; 310; 30; 20 MG/100ML; MG/100ML; MG/100ML; MG/100ML
125 INJECTION, SOLUTION INTRAVENOUS CONTINUOUS
Status: DISCONTINUED | OUTPATIENT
Start: 2021-10-14 | End: 2021-10-16 | Stop reason: HOSPADM

## 2021-10-14 RX ORDER — DIPHENHYDRAMINE HYDROCHLORIDE 50 MG/ML
25 INJECTION INTRAMUSCULAR; INTRAVENOUS EVERY 6 HOURS PRN
Status: DISCONTINUED | OUTPATIENT
Start: 2021-10-15 | End: 2021-10-16 | Stop reason: HOSPADM

## 2021-10-14 RX ORDER — ONDANSETRON 2 MG/ML
4 INJECTION INTRAMUSCULAR; INTRAVENOUS ONCE AS NEEDED
Status: DISCONTINUED | OUTPATIENT
Start: 2021-10-14 | End: 2021-10-16 | Stop reason: HOSPADM

## 2021-10-14 RX ORDER — ACETAMINOPHEN 325 MG/1
650 TABLET ORAL EVERY 6 HOURS SCHEDULED
Status: DISCONTINUED | OUTPATIENT
Start: 2021-10-14 | End: 2021-10-14

## 2021-10-14 RX ORDER — ONDANSETRON 2 MG/ML
4 INJECTION INTRAMUSCULAR; INTRAVENOUS EVERY 4 HOURS PRN
Status: ACTIVE | OUTPATIENT
Start: 2021-10-14 | End: 2021-10-15

## 2021-10-14 RX ORDER — CALCIUM CARBONATE 200(500)MG
1000 TABLET,CHEWABLE ORAL DAILY PRN
Status: DISCONTINUED | OUTPATIENT
Start: 2021-10-14 | End: 2021-10-16 | Stop reason: HOSPADM

## 2021-10-14 RX ORDER — DIPHENHYDRAMINE HYDROCHLORIDE 50 MG/ML
10 INJECTION INTRAMUSCULAR; INTRAVENOUS EVERY 6 HOURS PRN
Status: ACTIVE | OUTPATIENT
Start: 2021-10-14 | End: 2021-10-15

## 2021-10-14 RX ORDER — IBUPROFEN 600 MG/1
600 TABLET ORAL EVERY 6 HOURS SCHEDULED
Status: CANCELLED | OUTPATIENT
Start: 2021-10-15

## 2021-10-14 RX ORDER — ONDANSETRON 2 MG/ML
4 INJECTION INTRAMUSCULAR; INTRAVENOUS EVERY 8 HOURS PRN
Status: DISCONTINUED | OUTPATIENT
Start: 2021-10-15 | End: 2021-10-16 | Stop reason: HOSPADM

## 2021-10-14 RX ORDER — KETOROLAC TROMETHAMINE 30 MG/ML
INJECTION, SOLUTION INTRAMUSCULAR; INTRAVENOUS AS NEEDED
Status: DISCONTINUED | OUTPATIENT
Start: 2021-10-14 | End: 2021-10-14

## 2021-10-14 RX ORDER — NALOXONE HYDROCHLORIDE 0.4 MG/ML
0.1 INJECTION, SOLUTION INTRAMUSCULAR; INTRAVENOUS; SUBCUTANEOUS
Status: ACTIVE | OUTPATIENT
Start: 2021-10-14 | End: 2021-10-15

## 2021-10-14 RX ORDER — OXYTOCIN/RINGER'S LACTATE 30/500 ML
62.5 PLASTIC BAG, INJECTION (ML) INTRAVENOUS CONTINUOUS
Status: ACTIVE | OUTPATIENT
Start: 2021-10-14 | End: 2021-10-14

## 2021-10-14 RX ADMIN — Medication 250 MILLI-UNITS/MIN: at 10:39

## 2021-10-14 RX ADMIN — FENTANYL CITRATE 20 MCG: 50 INJECTION, SOLUTION INTRAMUSCULAR; INTRAVENOUS at 10:21

## 2021-10-14 RX ADMIN — SODIUM CHLORIDE, SODIUM LACTATE, POTASSIUM CHLORIDE, AND CALCIUM CHLORIDE: .6; .31; .03; .02 INJECTION, SOLUTION INTRAVENOUS at 10:19

## 2021-10-14 RX ADMIN — DEXAMETHASONE SODIUM PHOSPHATE 4 MG: 4 INJECTION, SOLUTION INTRAMUSCULAR; INTRAVENOUS at 10:40

## 2021-10-14 RX ADMIN — SODIUM CITRATE AND CITRIC ACID MONOHYDRATE 30 ML: 500; 334 SOLUTION ORAL at 09:52

## 2021-10-14 RX ADMIN — KETOROLAC TROMETHAMINE 30 MG: 30 INJECTION, SOLUTION INTRAMUSCULAR at 11:27

## 2021-10-14 RX ADMIN — ACETAMINOPHEN 650 MG: 325 TABLET, FILM COATED ORAL at 21:43

## 2021-10-14 RX ADMIN — BUPIVACAINE HYDROCHLORIDE IN DEXTROSE 1.6 ML: 7.5 INJECTION, SOLUTION SUBARACHNOID at 10:21

## 2021-10-14 RX ADMIN — ACETAMINOPHEN 650 MG: 325 TABLET, FILM COATED ORAL at 15:36

## 2021-10-14 RX ADMIN — DOCUSATE SODIUM 100 MG: 100 CAPSULE ORAL at 17:56

## 2021-10-14 RX ADMIN — FAMOTIDINE 20 MG: 10 INJECTION, SOLUTION INTRAVENOUS at 09:52

## 2021-10-14 RX ADMIN — SODIUM CHLORIDE, SODIUM LACTATE, POTASSIUM CHLORIDE, AND CALCIUM CHLORIDE 125 ML/HR: .6; .31; .03; .02 INJECTION, SOLUTION INTRAVENOUS at 13:48

## 2021-10-14 RX ADMIN — Medication 62.5 MILLI-UNITS/MIN: at 13:48

## 2021-10-14 RX ADMIN — CEFAZOLIN SODIUM 2000 MG: 2 SOLUTION INTRAVENOUS at 10:20

## 2021-10-14 RX ADMIN — SODIUM CHLORIDE 125 ML/HR: 0.9 INJECTION, SOLUTION INTRAVENOUS at 08:40

## 2021-10-14 RX ADMIN — MORPHINE SULFATE 0.15 MG: 1 INJECTION, SOLUTION EPIDURAL; INTRATHECAL; INTRAVENOUS at 10:21

## 2021-10-14 RX ADMIN — Medication 62.5 MILLI-UNITS/MIN: at 11:32

## 2021-10-14 RX ADMIN — PHENYLEPHRINE HYDROCHLORIDE 50 MCG/MIN: 10 INJECTION INTRAVENOUS at 10:17

## 2021-10-14 RX ADMIN — ONDANSETRON 4 MG: 2 INJECTION INTRAMUSCULAR; INTRAVENOUS at 10:20

## 2021-10-15 LAB
ERYTHROCYTE [DISTWIDTH] IN BLOOD BY AUTOMATED COUNT: 14.3 % (ref 11.6–15.1)
HCT VFR BLD AUTO: 30.3 % (ref 34.8–46.1)
HGB BLD-MCNC: 9.4 G/DL (ref 11.5–15.4)
MCH RBC QN AUTO: 24.4 PG (ref 26.8–34.3)
MCHC RBC AUTO-ENTMCNC: 31 G/DL (ref 31.4–37.4)
MCV RBC AUTO: 79 FL (ref 82–98)
PLATELET # BLD AUTO: 242 THOUSANDS/UL (ref 149–390)
PMV BLD AUTO: 10.2 FL (ref 8.9–12.7)
RBC # BLD AUTO: 3.85 MILLION/UL (ref 3.81–5.12)
WBC # BLD AUTO: 11.02 THOUSAND/UL (ref 4.31–10.16)

## 2021-10-15 PROCEDURE — 85027 COMPLETE CBC AUTOMATED: CPT | Performed by: OBSTETRICS & GYNECOLOGY

## 2021-10-15 PROCEDURE — 99024 POSTOP FOLLOW-UP VISIT: CPT | Performed by: STUDENT IN AN ORGANIZED HEALTH CARE EDUCATION/TRAINING PROGRAM

## 2021-10-15 RX ORDER — IBUPROFEN 600 MG/1
600 TABLET ORAL EVERY 6 HOURS SCHEDULED
Status: DISCONTINUED | OUTPATIENT
Start: 2021-10-15 | End: 2021-10-16 | Stop reason: HOSPADM

## 2021-10-15 RX ORDER — OXYCODONE HYDROCHLORIDE 5 MG/1
5 TABLET ORAL EVERY 4 HOURS PRN
Status: DISCONTINUED | OUTPATIENT
Start: 2021-10-15 | End: 2021-10-16 | Stop reason: HOSPADM

## 2021-10-15 RX ORDER — OXYCODONE HYDROCHLORIDE 10 MG/1
10 TABLET ORAL EVERY 4 HOURS PRN
Status: DISCONTINUED | OUTPATIENT
Start: 2021-10-15 | End: 2021-10-16 | Stop reason: HOSPADM

## 2021-10-15 RX ADMIN — DOCUSATE SODIUM 100 MG: 100 CAPSULE ORAL at 18:39

## 2021-10-15 RX ADMIN — IBUPROFEN 600 MG: 600 TABLET ORAL at 21:08

## 2021-10-15 RX ADMIN — ENOXAPARIN SODIUM 40 MG: 40 INJECTION SUBCUTANEOUS at 21:08

## 2021-10-15 RX ADMIN — ACETAMINOPHEN 650 MG: 325 TABLET, FILM COATED ORAL at 10:41

## 2021-10-15 RX ADMIN — ACETAMINOPHEN 650 MG: 325 TABLET, FILM COATED ORAL at 16:18

## 2021-10-15 RX ADMIN — IBUPROFEN 600 MG: 600 TABLET ORAL at 08:47

## 2021-10-15 RX ADMIN — IBUPROFEN 600 MG: 600 TABLET ORAL at 15:29

## 2021-10-15 RX ADMIN — DOCUSATE SODIUM 100 MG: 100 CAPSULE ORAL at 08:47

## 2021-10-15 RX ADMIN — ENOXAPARIN SODIUM 40 MG: 40 INJECTION SUBCUTANEOUS at 08:46

## 2021-10-15 RX ADMIN — ACETAMINOPHEN 650 MG: 325 TABLET, FILM COATED ORAL at 22:02

## 2021-10-15 RX ADMIN — Medication 1 TABLET: at 08:46

## 2021-10-15 RX ADMIN — ACETAMINOPHEN 650 MG: 325 TABLET, FILM COATED ORAL at 04:12

## 2021-10-16 VITALS
TEMPERATURE: 97.8 F | HEIGHT: 63 IN | WEIGHT: 262 LBS | SYSTOLIC BLOOD PRESSURE: 126 MMHG | OXYGEN SATURATION: 100 % | DIASTOLIC BLOOD PRESSURE: 87 MMHG | BODY MASS INDEX: 46.42 KG/M2 | RESPIRATION RATE: 18 BRPM | HEART RATE: 80 BPM

## 2021-10-16 PROCEDURE — 99024 POSTOP FOLLOW-UP VISIT: CPT | Performed by: OBSTETRICS & GYNECOLOGY

## 2021-10-16 PROCEDURE — NC001 PR NO CHARGE: Performed by: OBSTETRICS & GYNECOLOGY

## 2021-10-16 RX ORDER — IBUPROFEN 600 MG/1
600 TABLET ORAL EVERY 6 HOURS SCHEDULED
Qty: 30 TABLET | Refills: 0
Start: 2021-10-16

## 2021-10-16 RX ORDER — OXYCODONE HYDROCHLORIDE 5 MG/1
5 TABLET ORAL EVERY 4 HOURS PRN
Qty: 5 TABLET | Refills: 0 | Status: CANCELLED | OUTPATIENT
Start: 2021-10-16 | End: 2021-10-26

## 2021-10-16 RX ADMIN — Medication 1 TABLET: at 08:59

## 2021-10-16 RX ADMIN — ACETAMINOPHEN 650 MG: 325 TABLET, FILM COATED ORAL at 09:00

## 2021-10-16 RX ADMIN — DOCUSATE SODIUM 100 MG: 100 CAPSULE ORAL at 09:00

## 2021-10-16 RX ADMIN — IBUPROFEN 600 MG: 600 TABLET ORAL at 03:48

## 2021-10-16 RX ADMIN — ENOXAPARIN SODIUM 40 MG: 40 INJECTION SUBCUTANEOUS at 09:00

## 2021-10-16 RX ADMIN — ACETAMINOPHEN 650 MG: 325 TABLET, FILM COATED ORAL at 03:48

## 2021-10-16 RX ADMIN — IBUPROFEN 600 MG: 600 TABLET ORAL at 09:00

## 2021-11-05 ENCOUNTER — POSTPARTUM VISIT (OUTPATIENT)
Dept: OBGYN CLINIC | Facility: CLINIC | Age: 31
End: 2021-11-05

## 2021-11-05 VITALS — SYSTOLIC BLOOD PRESSURE: 122 MMHG | WEIGHT: 242 LBS | DIASTOLIC BLOOD PRESSURE: 78 MMHG | BODY MASS INDEX: 42.87 KG/M2

## 2021-11-05 DIAGNOSIS — Z30.011 ENCOUNTER FOR INITIAL PRESCRIPTION OF CONTRACEPTIVE PILLS: ICD-10-CM

## 2021-11-05 PROCEDURE — 99024 POSTOP FOLLOW-UP VISIT: CPT | Performed by: OBSTETRICS & GYNECOLOGY

## 2021-11-05 RX ORDER — NORETHINDRONE ACETATE AND ETHINYL ESTRADIOL 1.5-30(21)
1 KIT ORAL DAILY
Qty: 84 TABLET | Refills: 3 | Status: SHIPPED | OUTPATIENT
Start: 2021-11-05

## 2022-01-11 ENCOUNTER — TELEPHONE (OUTPATIENT)
Dept: OBGYN CLINIC | Facility: CLINIC | Age: 32
End: 2022-01-11

## 2022-01-11 NOTE — TELEPHONE ENCOUNTER
Patient Delivered 10/14 via C/S   Would like a letter stating she can go back to work 1/17  Last seen PP visit 11/5 with Dr Basilio Levy  Please Fax: [phone]  Attn:Heather Mills [facility] 20

## 2022-01-11 NOTE — LETTER
January 13, 2022    Deanna Chaney  [address] [place] [address] Drive, [address]      To Whom It May Concern:    Please be advised that Reagan Johnson is under our professional care  She is able to return to work without restrictions  If you have any questions please contact our office at [phone], Thank you          Sincerely,    [facility] [place]

## 2022-01-11 NOTE — TELEPHONE ENCOUNTER
Lm to pt to see if she can wait for [employer] to return or needs note right away ( then we can clear it with oncall if needed)

## 2022-01-11 NOTE — TELEPHONE ENCOUNTER
Pt called back to say if [employer] can do the note on and by the 13th she can wait she needs the note to be in by Friday she returns to work on Monday

## 2022-11-17 ENCOUNTER — HOSPITAL ENCOUNTER (OUTPATIENT)
Dept: RADIOLOGY | Facility: MEDICAL CENTER | Age: 32
Discharge: HOME | End: 2022-11-17

## 2022-11-17 DIAGNOSIS — G43.909 MIGRAINE, UNSPECIFIED, NOT INTRACTABLE, WITHOUT STATUS MIGRAINOSUS: ICD-10-CM

## 2023-12-21 ENCOUNTER — APPOINTMENT (EMERGENCY)
Dept: RADIOLOGY | Facility: HOSPITAL | Age: 33
End: 2023-12-21
Payer: COMMERCIAL

## 2023-12-21 ENCOUNTER — HOSPITAL ENCOUNTER (EMERGENCY)
Facility: HOSPITAL | Age: 33
Discharge: HOME | End: 2023-12-21
Attending: EMERGENCY MEDICINE
Payer: COMMERCIAL

## 2023-12-21 VITALS
RESPIRATION RATE: 17 BRPM | TEMPERATURE: 99.1 F | HEART RATE: 98 BPM | SYSTOLIC BLOOD PRESSURE: 123 MMHG | DIASTOLIC BLOOD PRESSURE: 81 MMHG | OXYGEN SATURATION: 100 %

## 2023-12-21 DIAGNOSIS — R07.9 CHEST PAIN: Primary | ICD-10-CM

## 2023-12-21 DIAGNOSIS — R00.0 TACHYCARDIA: ICD-10-CM

## 2023-12-21 LAB
ALBUMIN SERPL BCP-MCNC: 4.4 G/DL (ref 3.5–5)
ALP SERPL-CCNC: 49 U/L (ref 34–104)
ALT SERPL W P-5'-P-CCNC: 13 U/L (ref 7–52)
ANION GAP SERPL CALCULATED.3IONS-SCNC: 7 MMOL/L
AST SERPL W P-5'-P-CCNC: 15 U/L (ref 13–39)
ATRIAL RATE: 114 BPM
BASOPHILS # BLD AUTO: 0.02 THOUSANDS/ÂΜL (ref 0–0.1)
BASOPHILS NFR BLD AUTO: 0 % (ref 0–1)
BILIRUB DIRECT SERPL-MCNC: 0.09 MG/DL (ref 0–0.2)
BILIRUB SERPL-MCNC: 0.53 MG/DL (ref 0.2–1)
BUN SERPL-MCNC: 9 MG/DL (ref 5–25)
CALCIUM SERPL-MCNC: 9.2 MG/DL (ref 8.4–10.2)
CARDIAC TROPONIN I PNL SERPL HS: <2 NG/L
CHLORIDE SERPL-SCNC: 104 MMOL/L (ref 96–108)
CO2 SERPL-SCNC: 27 MMOL/L (ref 21–32)
CREAT SERPL-MCNC: 0.78 MG/DL (ref 0.6–1.3)
EOSINOPHIL # BLD AUTO: 0.03 THOUSAND/ÂΜL (ref 0–0.61)
EOSINOPHIL NFR BLD AUTO: 0 % (ref 0–6)
ERYTHROCYTE [DISTWIDTH] IN BLOOD BY AUTOMATED COUNT: 13.6 % (ref 11.6–15.1)
FLUAV RNA RESP QL NAA+PROBE: NEGATIVE
FLUBV RNA RESP QL NAA+PROBE: NEGATIVE
GFR SERPL CREATININE-BSD FRML MDRD: 100 ML/MIN/1.73SQ M
GLUCOSE SERPL-MCNC: 94 MG/DL (ref 65–140)
HCT VFR BLD AUTO: 40.2 % (ref 34.8–46.1)
HGB BLD-MCNC: 13 G/DL (ref 11.5–15.4)
IMM GRANULOCYTES # BLD AUTO: 0.01 THOUSAND/UL (ref 0–0.2)
IMM GRANULOCYTES NFR BLD AUTO: 0 % (ref 0–2)
LYMPHOCYTES # BLD AUTO: 1.27 THOUSANDS/ÂΜL (ref 0.6–4.47)
LYMPHOCYTES NFR BLD AUTO: 16 % (ref 14–44)
MCH RBC QN AUTO: 25.2 PG (ref 26.8–34.3)
MCHC RBC AUTO-ENTMCNC: 32.3 G/DL (ref 31.4–37.4)
MCV RBC AUTO: 78 FL (ref 82–98)
MONOCYTES # BLD AUTO: 0.38 THOUSAND/ÂΜL (ref 0.17–1.22)
MONOCYTES NFR BLD AUTO: 5 % (ref 4–12)
NEUTROPHILS # BLD AUTO: 6.05 THOUSANDS/ÂΜL (ref 1.85–7.62)
NEUTS SEG NFR BLD AUTO: 79 % (ref 43–75)
NRBC BLD AUTO-RTO: 0 /100 WBCS
P AXIS: 43 DEGREES
PLATELET # BLD AUTO: 293 THOUSANDS/UL (ref 149–390)
PMV BLD AUTO: 9.2 FL (ref 8.9–12.7)
POTASSIUM SERPL-SCNC: 3.7 MMOL/L (ref 3.5–5.3)
PR INTERVAL: 166 MS
PROT SERPL-MCNC: 7.4 G/DL (ref 6.4–8.4)
QRS AXIS: 106 DEGREES
QRSD INTERVAL: 78 MS
QT INTERVAL: 322 MS
QTC INTERVAL: 443 MS
RBC # BLD AUTO: 5.15 MILLION/UL (ref 3.81–5.12)
RSV RNA RESP QL NAA+PROBE: NEGATIVE
SARS-COV-2 RNA RESP QL NAA+PROBE: NEGATIVE
SODIUM SERPL-SCNC: 138 MMOL/L (ref 135–147)
T WAVE AXIS: 22 DEGREES
TSH SERPL DL<=0.05 MIU/L-ACNC: 1.26 UIU/ML (ref 0.45–4.5)
VENTRICULAR RATE: 114 BPM
WBC # BLD AUTO: 7.76 THOUSAND/UL (ref 4.31–10.16)

## 2023-12-21 PROCEDURE — 99285 EMERGENCY DEPT VISIT HI MDM: CPT | Performed by: EMERGENCY MEDICINE

## 2023-12-21 PROCEDURE — 99283 EMERGENCY DEPT VISIT LOW MDM: CPT

## 2023-12-21 PROCEDURE — 80076 HEPATIC FUNCTION PANEL: CPT | Performed by: EMERGENCY MEDICINE

## 2023-12-21 PROCEDURE — 36415 COLL VENOUS BLD VENIPUNCTURE: CPT | Performed by: EMERGENCY MEDICINE

## 2023-12-21 PROCEDURE — 0241U HB NFCT DS VIR RESP RNA 4 TRGT: CPT | Performed by: EMERGENCY MEDICINE

## 2023-12-21 PROCEDURE — 71045 X-RAY EXAM CHEST 1 VIEW: CPT

## 2023-12-21 PROCEDURE — 80048 BASIC METABOLIC PNL TOTAL CA: CPT | Performed by: EMERGENCY MEDICINE

## 2023-12-21 PROCEDURE — 93005 ELECTROCARDIOGRAM TRACING: CPT

## 2023-12-21 PROCEDURE — 84443 ASSAY THYROID STIM HORMONE: CPT | Performed by: EMERGENCY MEDICINE

## 2023-12-21 PROCEDURE — 85025 COMPLETE CBC W/AUTO DIFF WBC: CPT | Performed by: EMERGENCY MEDICINE

## 2023-12-21 PROCEDURE — 84484 ASSAY OF TROPONIN QUANT: CPT | Performed by: EMERGENCY MEDICINE

## 2023-12-21 NOTE — ED PROVIDER NOTES
History  Chief Complaint   Patient presents with    Shoulder Pain     Bilateral shoulder pain for past 2 weeks, denies chest pain and difficulty breathing and any injury. Congestion since .       32 yo female with two weeks of congestion and cold sxs. Today comes to ED c/o feeling anxious and stressed and having chest and b/l shoulder discomfort which she ascribes to stress. No syncope, hemoptysis, leg pain or swelling, h/o vte or recent surgery. No dyspnea. No exertional worsening of chest discomfort which she describes as a muscle tightness. No fever or chills.         Prior to Admission Medications   Prescriptions Last Dose Informant Patient Reported? Taking?   MAGNESIUM SULFATE PO  Self Yes No   Sig: Take by mouth    Patient not taking: Reported on 10/14/2021   Prenatal Vit-Fe Fumarate-FA (PRENATAL COMPLETE PO)  Self Yes No   Sig: Take by mouth   acetaminophen (TYLENOL) 325 mg tablet  Self No No   Sig: Take 2 tablets (650 mg total) by mouth every 4 (four) hours as needed for mild pain   Patient not taking: Reported on 2021   ibuprofen (MOTRIN) 600 mg tablet   No No   Sig: Take 1 tablet (600 mg total) by mouth every 6 (six) hours   Patient not taking: Reported on 2021   norethindrone-ethinyl estradiol-iron (Loestrin Fe 1.5/30) 1.5-30 MG-MCG tablet   No No   Sig: Take 1 tablet by mouth daily      Facility-Administered Medications: None       Past Medical History:   Diagnosis Date    Gestational diabetes mellitus, class A2     Obesity     Varicella     vacc       Past Surgical History:   Procedure Laterality Date    [place]  DELIVERY ONLY N/A 11/15/2018    Procedure:  SECTION ();  Surgeon: Rip Mcallister DO;  Location: [facility] ;  Service: Obstetrics    [place]  DELIVERY ONLY N/A 10/14/2021    Procedure:  SECTION () REPEAT;  Surgeon: Meghan Low MD;  Location: [facility] ;  Service: Obstetrics    WISDOM TOOTH EXTRACTION             Family  History   Problem Relation Age of Onset    Hypertension Mother         Essential, w/unspecified goal    Diabetes Mother         Mellitus type 2    Diabetes Father         Mellitus type 2    Cancer Father         skin    Arthritis Sister     No Known Problems Brother     Hypertension Maternal Grandmother     No Known Problems Maternal Grandfather     Hypertension Paternal Grandmother     Cancer Paternal Grandmother         lung, bone    Vision loss Paternal Grandmother         blindness    Cancer Paternal Grandfather         skin    Depression Sister     No Known Problems Daughter      I have reviewed and agree with the history as documented.    E-Cigarette/Vaping    E-Cigarette Use Never User      E-Cigarette/Vaping Substances    Nicotine No     THC No     CBD No     Flavoring No     Other No     Unknown No      Social History     Tobacco Use    Smoking status: Never    Smokeless tobacco: Never   Vaping Use    Vaping status: Never Used   Substance Use Topics    Alcohol use: No    Drug use: No       Review of Systems   Constitutional:  Negative for chills and fever.   Respiratory:  Positive for chest tightness. Negative for cough and shortness of breath.        Physical Exam  Physical Exam  Vitals and nursing note reviewed.   Constitutional:       General: She is not in acute distress.     Appearance: Normal appearance. She is well-developed. She is not ill-appearing, toxic-appearing or diaphoretic.   HENT:      Head: Normocephalic and atraumatic.      Mouth/Throat:      Mouth: Mucous membranes are moist.      Pharynx: Oropharynx is clear.   Eyes:      Conjunctiva/sclera: Conjunctivae normal.      Pupils: Pupils are equal, round, and reactive to light.   Neck:      Vascular: No JVD.   Cardiovascular:      Rate and Rhythm: Regular rhythm. Tachycardia present.      Pulses: Normal pulses.      Heart sounds: Normal heart sounds.   Pulmonary:      Effort: Pulmonary effort is normal. No respiratory distress.      Breath  sounds: Normal breath sounds. No stridor. No wheezing, rhonchi or rales.   Chest:      Chest wall: No tenderness.   Abdominal:      General: There is no distension.      Palpations: Abdomen is soft.      Tenderness: There is no abdominal tenderness. There is no guarding or rebound.   Musculoskeletal:         General: No swelling, tenderness, deformity or signs of injury. Normal range of motion.      Cervical back: Normal range of motion and neck supple. No rigidity.      Right lower leg: No edema.      Left lower leg: No edema.   Skin:     General: Skin is warm and dry.      Capillary Refill: Capillary refill takes less than 2 seconds.      Coloration: Skin is not jaundiced or pale.      Findings: No bruising, erythema or rash.   Neurological:      General: No focal deficit present.      Mental Status: She is alert and oriented to person, place, and time.      Cranial Nerves: No cranial nerve deficit.      Sensory: No sensory deficit.      Motor: No weakness or abnormal muscle tone.      Coordination: Coordination normal.      Gait: Gait normal.         Vital Signs  ED Triage Vitals [12/21/23 1205]   Temperature Pulse Respirations Blood Pressure SpO2   99.1 °F (37.3 °C) (!) 125 20 (!) 184/101 97 %      Temp Source Heart Rate Source Patient Position - Orthostatic VS BP Location FiO2 (%)   Temporal Monitor Sitting Left arm --      Pain Score       --           Vitals:    12/21/23 1205 12/21/23 1403   BP: (!) 184/101 123/81   Pulse: (!) 125 98   Patient Position - Orthostatic VS: Sitting Lying         Visual Acuity  Visual Acuity      Flowsheet Row Most Recent Value   L Pupil Size (mm) 3   R Pupil Size (mm) 3            ED Medications  Medications - No data to display    Diagnostic Studies  Results Reviewed       Procedure Component Value Units Date/Time    FLU/RSV/COVID - if FLU/RSV clinically relevant [[id number]]  (Normal) Collected: 12/21/23 1301    Lab Status: Final result Specimen: Nares from Nose Updated:  12/21/23 1354     SARS-CoV-2 Negative     INFLUENZA A PCR Negative     INFLUENZA B PCR Negative     RSV PCR Negative    Narrative:      FOR PEDIATRIC PATIENTS - copy/paste COVID Guidelines URL to browser: [email or web address]    SARS-CoV-2 assay is a Nucleic Acid Amplification assay intended for the  qualitative detection of nucleic acid from SARS-CoV-2 in nasopharyngeal  swabs. Results are for the presumptive identification of SARS-CoV-2 RNA.    Positive results are indicative of infection with SARS-CoV-2, the virus  causing COVID-19, but do not rule out bacterial infection or co-infection  with other viruses. Laboratories within the [place] and its  territories are required to report all positive results to the appropriate  public health authorities. Negative results do not preclude SARS-CoV-2  infection and should not be used as the sole basis for treatment or other  patient management decisions. Negative results must be combined with  clinical observations, patient history, and epidemiological information.  This test has not been FDA cleared or approved.    This test has been authorized by FDA under an Emergency Use Authorization  (EUA). This test is only authorized for the duration of time the  declaration that circumstances exist justifying the authorization of the  emergency use of an in vitro diagnostic tests for detection of SARS-CoV-2  virus and/or diagnosis of COVID-19 infection under section 564(b)(1) of  the Act, 21 U.S.C. 360bbb-3(b)(1), unless the authorization is terminated  or revoked sooner. The test has been validated but independent review by FDA  and CLIA is pending.    Test performed using [facility]: This RT-PCR assay targets N2,  a region unique to SARS-CoV-2. A conserved region in the E-gene was chosen  for pan-Sarbecovirus detection which includes SARS-CoV-2.    According to CMS-2020-01-R, this platform meets the definition  of high-throughput technology.    TSH, 3rd generation with Free T4 reflex [[id number]]  (Normal) Collected: 12/21/23 1301    Lab Status: Final result Specimen: Blood from Arm, Right Updated: 12/21/23 1348     TSH 3RD GENERATON 1.263 uIU/mL     HS Troponin 0hr (reflex protocol) [[id number]]  (Normal) Collected: 12/21/23 1301    Lab Status: Final result Specimen: Blood from Arm, Right Updated: 12/21/23 1334     hs TnI 0hr <2 ng/L     Basic metabolic panel [[id number]] Collected: 12/21/23 1301    Lab Status: Final result Specimen: Blood from Arm, Right Updated: 12/21/23 1329     Sodium 138 mmol/L      Potassium 3.7 mmol/L      Chloride 104 mmol/L      CO2 27 mmol/L      ANION GAP 7 mmol/L      BUN 9 mg/dL      Creatinine 0.78 mg/dL      Glucose 94 mg/dL      Calcium 9.2 mg/dL      eGFR 100 ml/min/1.73sq m     Narrative:      National Kidney Disease Foundation guidelines for Chronic Kidney Disease (CKD):     Stage 1 with normal or high GFR (GFR > 90 mL/min/1.73 square meters)    Stage 2 Mild CKD (GFR = 60-89 mL/min/1.73 square meters)    Stage 3A Moderate CKD (GFR = 45-59 mL/min/1.73 square meters)    Stage 3B Moderate CKD (GFR = 30-44 mL/min/1.73 square meters)    Stage 4 Severe CKD (GFR = 15-29 mL/min/1.73 square meters)    Stage 5 End Stage CKD (GFR <15 mL/min/1.73 square meters)  Note: GFR calculation is accurate only with a steady state creatinine    Hepatic function panel [[id number]]  (Normal) Collected: 12/21/23 1301    Lab Status: Final result Specimen: Blood from Arm, Right Updated: 12/21/23 1329     Total Bilirubin 0.53 mg/dL      Bilirubin, Direct 0.09 mg/dL      Alkaline Phosphatase 49 U/L      AST 15 U/L      ALT 13 U/L      Total Protein 7.4 g/dL      Albumin 4.4 g/dL     CBC and differential [[id number]]  (Abnormal) Collected: 12/21/23 1301    Lab Status: Final result Specimen: Blood from Arm, Right Updated: 12/21/23 1310     WBC 7.76 Thousand/uL      RBC 5.15 Million/uL      Hemoglobin 13.0 g/dL       Hematocrit 40.2 %      MCV 78 fL      MCH 25.2 pg      MCHC 32.3 g/dL      RDW 13.6 %      MPV 9.2 fL      Platelets 293 Thousands/uL      nRBC 0 /100 WBCs      Neutrophils Relative 79 %      Immat GRANS % 0 %      Lymphocytes Relative 16 %      Monocytes Relative 5 %      Eosinophils Relative 0 %      Basophils Relative 0 %      Neutrophils Absolute 6.05 Thousands/µL      Immature Grans Absolute 0.01 Thousand/uL      Lymphocytes Absolute 1.27 Thousands/µL      Monocytes Absolute 0.38 Thousand/µL      Eosinophils Absolute 0.03 Thousand/µL      Basophils Absolute 0.02 Thousands/µL                    XR chest 1 view portable   Final Result by Angelica Mendez MD (12/21 1256)      No acute cardiopulmonary disease.               Workstation performed: [id number]                    Procedures  ECG 12 Lead Documentation Only    Date/Time: 12/21/2023 12:41 PM    Performed by: Fan Blanco MD  Authorized by: Fan Blanco MD    Indications / Diagnosis:  Chest pain  ECG reviewed by me, the ED Provider: yes    Patient location:  ED  Previous ECG:     Previous ECG:  Unavailable  Interpretation:     Interpretation: non-specific    Rate:     ECG rate:  114    ECG rate assessment: tachycardic    Rhythm:     Rhythm: sinus tachycardia             ED Course             HEART Risk Score      Flowsheet Row Most Recent Value   Heart Score Risk Calculator    History 0 Filed at: 12/21/2023 1736   ECG 0 Filed at: 12/21/2023 1736   Age 0 Filed at: 12/21/2023 1736   Risk Factors 0 Filed at: 12/21/2023 1736   Troponin 0 Filed at: 12/21/2023 1736   HEART Score 0 Filed at: 12/21/2023 1736                          SBIRT 20yo+      Flowsheet Row Most Recent Value   Initial Alcohol Screen: US AUDIT-C     1. How often do you have a drink containing alcohol? 0 Filed at: 12/21/2023 1237   2. How many drinks containing alcohol do you have on a typical day you are drinking?  0 Filed at: 12/21/2023 1237   3b. FEMALE Any Age, or MALE 65+: How  often do you have 4 or more drinks on one occassion? 0 Filed at: 12/21/2023 1237   Audit-C Score 0 Filed at: 12/21/2023 1237   JB: How many times in the past year have you...    Used an illegal drug or used a prescription medication for non-medical reasons? Never Filed at: 12/21/2023 1237                      Medical Decision Making  Problems Addressed:  Chest pain: complicated acute illness or injury that poses a threat to life or bodily functions  Tachycardia: complicated acute illness or injury    Amount and/or Complexity of Data Reviewed  Labs: ordered.  Radiology: ordered.             Disposition  Final diagnoses:   Chest pain   Tachycardia     Time reflects when diagnosis was documented in both MDM as applicable and the Disposition within this note       Time User Action Codes Description Comment    12/21/2023  1:49 PM Blanco, Fan Add [R07.9] Chest pain     12/21/2023  1:49 PM Blanco, Fan Add [R00.0] Tachycardia           ED Disposition       ED Disposition   Discharge    Condition   Stable    Date/Time   Thu Dec 21, 2023 1348    Comment   Lisa Wu discharge to home/self care.                   Follow-up Information       Follow up With Specialties Details Why Contact Info    Rony Power MD Internal Medicine In 1 day  [facility]  [address]  [address] [facility] [address]  [facility] [address]  [phone]              Discharge Medication List as of 12/21/2023  1:49 PM        CONTINUE these medications which have NOT CHANGED    Details   acetaminophen (TYLENOL) 325 mg tablet Take 2 tablets (650 mg total) by mouth every 4 (four) hours as needed for mild pain, Starting Sat 11/17/2018, No Print      ibuprofen (MOTRIN) 600 mg tablet Take 1 tablet (600 mg total) by mouth every 6 (six) hours, Starting Sat 10/16/2021, No Print      MAGNESIUM SULFATE PO Take by mouth , Historical Med      norethindrone-ethinyl estradiol-iron (Loestrin Fe 1.5/30) 1.5-30 MG-MCG tablet Take 1 tablet by mouth daily, Starting  Fri 11/5/2021, Normal      Prenatal Vit-Fe Fumarate-FA (PRENATAL COMPLETE PO) Take by mouth, Historical Med             No discharge procedures on file.    PDMP Review       None            ED Provider  Electronically Signed by             Fan Blanco MD  12/21/23 4722

## 2023-12-21 NOTE — Clinical Note
Lisa Wu was seen and treated in our emergency department on 12/21/2023.                Diagnosis:     Lisa  may return to work on return date.    She may return on this date: 12/25/2023         If you have any questions or concerns, please don't hesitate to call.      Fan Blanco MD    ______________________________           _______________          _______________  Hospital Representative                              Date                                Time

## 2024-01-04 NOTE — ASSESSMENT & PLAN NOTE
Passed early glucstanley  Will go for 32 week growth scan at [facility]  PROCEDURES:  shameka Hobson 04-Jan-2024 16:02:50  Joaquin Proctor

## 2024-02-06 NOTE — PROGRESS NOTES
Patient chose to have Stepwise Part 1 Screening from [facility]  Instructed patient blood work must be collected no later than  4/22/2021  Reviewed [facility] online appointment scheduling availability  Part 1 results will be available in approximately 7-10 business days  Maternal-Fetal Medicine will call patient with results or she can view in her [address]  Lab requisition will be mailed for Part 2 screening, ideal time for Part 2 collection is between 16-18 weeks gestation  Patient verbalized understanding of all instructions  Chronic kidney disease, stage 5

## (undated) DEVICE — SUT PLAIN 2-0 CTX 27 IN 872H

## (undated) DEVICE — ABDOMINAL PAD: Brand: DERMACEA

## (undated) DEVICE — GAUZE SPONGES,16 PLY: Brand: CURITY

## (undated) DEVICE — SUT VICRYL 0 CTX 36 IN J978H

## (undated) DEVICE — TELFA NON-ADHERENT ABSORBENT DRESSING: Brand: TELFA

## (undated) DEVICE — PACK C-SECTION PBDS

## (undated) DEVICE — Device

## (undated) DEVICE — SUT PLAIN 3-0 CT-1 27 IN 842H

## (undated) DEVICE — SUT MONOCRYL 4-0 PS-2 27 IN Y426H

## (undated) DEVICE — GLOVE PI ULTRA TOUCH SZ.7.0

## (undated) DEVICE — GLOVE INDICATOR PI UNDERGLOVE SZ 8 BLUE

## (undated) DEVICE — CHLORAPREP HI-LITE 10.5ML ORANGE

## (undated) DEVICE — SUT VICRYL 0 CT-1 27 IN J260H

## (undated) DEVICE — SKIN MARKER DUAL TIP WITH RULER CAP, FLEXIBLE RULER AND LABELS: Brand: DEVON

## (undated) DEVICE — SPONGE LAP 18 X 18 IN STRL RFD

## (undated) DEVICE — GLOVE SRG BIOGEL ECLIPSE 7.5

## (undated) DEVICE — PROXIMATE PLUS MD MULTI-DIRECTIONAL RELEASE SKIN STAPLERS CONTAINS 35 STAINLESS STEEL STAPLES APPROXIMATE CLOSED DIMENSIONS: 6.9MM X 3.9MM WIDE: Brand: PROXIMATE

## (undated) DEVICE — SUT VICRYL 2-0 CT-1 36 IN VR945